# Patient Record
Sex: MALE | Race: WHITE | NOT HISPANIC OR LATINO | Employment: FULL TIME | URBAN - METROPOLITAN AREA
[De-identification: names, ages, dates, MRNs, and addresses within clinical notes are randomized per-mention and may not be internally consistent; named-entity substitution may affect disease eponyms.]

---

## 2023-09-11 ENCOUNTER — APPOINTMENT (OUTPATIENT)
Dept: PRIMARY CARE | Facility: CLINIC | Age: 38
End: 2023-09-11
Payer: COMMERCIAL

## 2023-09-30 PROBLEM — N39.0 RECURRENT UTI: Status: ACTIVE | Noted: 2023-09-30

## 2023-09-30 PROBLEM — I10 HYPERTENSION: Status: ACTIVE | Noted: 2023-09-30

## 2023-09-30 PROBLEM — R61 NIGHT SWEATS: Status: ACTIVE | Noted: 2023-09-30

## 2023-09-30 PROBLEM — K76.0 FATTY LIVER: Status: ACTIVE | Noted: 2023-09-30

## 2023-09-30 PROBLEM — R00.2 PALPITATIONS: Status: ACTIVE | Noted: 2023-09-30

## 2023-09-30 PROBLEM — N45.2 ORCHITIS: Status: ACTIVE | Noted: 2023-09-30

## 2023-09-30 PROBLEM — E87.6 HYPOKALEMIA: Status: ACTIVE | Noted: 2023-09-30

## 2023-09-30 PROBLEM — L30.9 ECZEMA: Status: ACTIVE | Noted: 2023-09-30

## 2023-09-30 PROBLEM — E66.9 OBESITY WITH BODY MASS INDEX (BMI) OF 35.0 TO 39.9 WITHOUT COMORBIDITY: Status: ACTIVE | Noted: 2023-09-30

## 2023-09-30 PROBLEM — R00.0 TACHYCARDIA: Status: ACTIVE | Noted: 2023-09-30

## 2023-09-30 PROBLEM — G89.18 POSTOPERATIVE PAIN: Status: ACTIVE | Noted: 2023-09-30

## 2023-09-30 PROBLEM — H60.90 OTITIS EXTERNA: Status: ACTIVE | Noted: 2023-09-30

## 2023-09-30 PROBLEM — I10 ESSENTIAL HYPERTENSION: Status: ACTIVE | Noted: 2023-09-30

## 2023-09-30 PROBLEM — K21.9 GERD (GASTROESOPHAGEAL REFLUX DISEASE): Status: ACTIVE | Noted: 2023-09-30

## 2023-09-30 PROBLEM — E78.5 HYPERLIPIDEMIA: Status: ACTIVE | Noted: 2023-09-30

## 2023-09-30 PROBLEM — R63.5 WEIGHT GAIN: Status: ACTIVE | Noted: 2023-09-30

## 2023-09-30 PROBLEM — K58.9 IBS (IRRITABLE BOWEL SYNDROME): Status: ACTIVE | Noted: 2023-09-30

## 2023-09-30 PROBLEM — F32.A ANXIETY AND DEPRESSION: Status: ACTIVE | Noted: 2023-09-30

## 2023-09-30 PROBLEM — F41.9 ANXIETY AND DEPRESSION: Status: ACTIVE | Noted: 2023-09-30

## 2023-09-30 PROBLEM — L98.9 SKIN LESION: Status: ACTIVE | Noted: 2023-09-30

## 2023-09-30 PROBLEM — N50.819 PERSISTENT TESTICULAR PAIN: Status: ACTIVE | Noted: 2023-09-30

## 2023-09-30 PROBLEM — I80.9 SUPERFICIAL THROMBOPHLEBITIS: Status: ACTIVE | Noted: 2023-09-30

## 2023-09-30 PROBLEM — F41.0 PANIC ATTACK: Status: ACTIVE | Noted: 2023-09-30

## 2023-09-30 PROBLEM — G47.00 INSOMNIA: Status: ACTIVE | Noted: 2023-09-30

## 2023-09-30 PROBLEM — R80.9 PROTEINURIA: Status: ACTIVE | Noted: 2023-09-30

## 2023-09-30 RX ORDER — OXYCODONE AND ACETAMINOPHEN 5; 325 MG/1; MG/1
1 TABLET ORAL EVERY 4 HOURS PRN
COMMUNITY
Start: 2023-05-30 | End: 2023-11-14 | Stop reason: ALTCHOICE

## 2023-09-30 RX ORDER — TRAZODONE HYDROCHLORIDE 50 MG/1
TABLET ORAL
COMMUNITY
Start: 2016-04-05 | End: 2023-11-14 | Stop reason: ALTCHOICE

## 2023-09-30 RX ORDER — ALPRAZOLAM 1 MG/1
1 TABLET ORAL 2 TIMES DAILY PRN
COMMUNITY
Start: 2023-08-29 | End: 2023-10-11 | Stop reason: SDUPTHER

## 2023-09-30 RX ORDER — ESOMEPRAZOLE MAGNESIUM 40 MG/1
40 CAPSULE, DELAYED RELEASE ORAL DAILY
COMMUNITY
End: 2024-04-30

## 2023-09-30 RX ORDER — GABAPENTIN 600 MG/1
600 TABLET ORAL 3 TIMES DAILY
COMMUNITY
Start: 2022-11-08 | End: 2023-11-14 | Stop reason: ALTCHOICE

## 2023-09-30 RX ORDER — QUETIAPINE FUMARATE 25 MG/1
25 TABLET, FILM COATED ORAL NIGHTLY
COMMUNITY
Start: 2023-02-13 | End: 2023-11-14 | Stop reason: ALTCHOICE

## 2023-09-30 RX ORDER — QUETIAPINE FUMARATE 50 MG/1
50 TABLET, FILM COATED ORAL NIGHTLY
COMMUNITY
Start: 2023-03-01 | End: 2023-11-14 | Stop reason: ALTCHOICE

## 2023-09-30 RX ORDER — CYCLOBENZAPRINE HCL 10 MG
10 TABLET ORAL EVERY 8 HOURS PRN
COMMUNITY
Start: 2023-05-24 | End: 2023-11-14 | Stop reason: ALTCHOICE

## 2023-09-30 RX ORDER — ATORVASTATIN CALCIUM 40 MG/1
40 TABLET, FILM COATED ORAL DAILY
COMMUNITY
End: 2024-04-16

## 2023-09-30 RX ORDER — SERTRALINE HYDROCHLORIDE 100 MG/1
200 TABLET, FILM COATED ORAL DAILY
COMMUNITY
Start: 2023-06-04

## 2023-09-30 RX ORDER — LISINOPRIL 40 MG/1
40 TABLET ORAL DAILY
COMMUNITY
Start: 2023-03-10 | End: 2023-11-14 | Stop reason: ALTCHOICE

## 2023-09-30 RX ORDER — CLONAZEPAM 1 MG/1
1 TABLET ORAL 2 TIMES DAILY PRN
COMMUNITY
End: 2023-11-14 | Stop reason: ALTCHOICE

## 2023-09-30 RX ORDER — ALPRAZOLAM 0.5 MG/1
0.5 TABLET ORAL 2 TIMES DAILY PRN
COMMUNITY
Start: 2023-02-26 | End: 2023-10-11

## 2023-09-30 RX ORDER — CIPROFLOXACIN AND DEXAMETHASONE 3; 1 MG/ML; MG/ML
4 SUSPENSION/ DROPS AURICULAR (OTIC) 2 TIMES DAILY
COMMUNITY
Start: 2022-08-25 | End: 2023-11-14 | Stop reason: ALTCHOICE

## 2023-09-30 RX ORDER — BUSPIRONE HYDROCHLORIDE 15 MG/1
15 TABLET ORAL 2 TIMES DAILY
COMMUNITY
Start: 2023-08-28 | End: 2024-06-03

## 2023-09-30 RX ORDER — VENLAFAXINE HYDROCHLORIDE 75 MG/1
225 CAPSULE, EXTENDED RELEASE ORAL DAILY
COMMUNITY
End: 2023-11-14 | Stop reason: ALTCHOICE

## 2023-09-30 RX ORDER — QUETIAPINE FUMARATE 100 MG/1
100 TABLET, FILM COATED ORAL NIGHTLY
COMMUNITY
Start: 2023-08-02 | End: 2023-12-11

## 2023-09-30 RX ORDER — SEMAGLUTIDE 1.34 MG/ML
INJECTION, SOLUTION SUBCUTANEOUS
COMMUNITY
Start: 2022-07-12 | End: 2023-11-14 | Stop reason: ALTCHOICE

## 2023-09-30 RX ORDER — BUSPIRONE HYDROCHLORIDE 7.5 MG/1
7.5 TABLET ORAL 2 TIMES DAILY
COMMUNITY
Start: 2023-06-02 | End: 2023-11-14 | Stop reason: WASHOUT

## 2023-09-30 RX ORDER — GABAPENTIN 300 MG/1
300 CAPSULE ORAL 3 TIMES DAILY
COMMUNITY
Start: 2022-10-15 | End: 2023-11-14 | Stop reason: ALTCHOICE

## 2023-09-30 RX ORDER — TRIAMCINOLONE ACETONIDE 1 MG/G
CREAM TOPICAL
COMMUNITY
Start: 2023-06-02

## 2023-09-30 RX ORDER — LISINOPRIL 20 MG/1
20 TABLET ORAL DAILY
COMMUNITY

## 2023-10-11 ENCOUNTER — TELEPHONE (OUTPATIENT)
Dept: PRIMARY CARE | Facility: CLINIC | Age: 38
End: 2023-10-11
Payer: COMMERCIAL

## 2023-10-11 DIAGNOSIS — F41.9 ANXIETY AND DEPRESSION: Primary | ICD-10-CM

## 2023-10-11 DIAGNOSIS — F32.A ANXIETY AND DEPRESSION: Primary | ICD-10-CM

## 2023-10-11 RX ORDER — ALPRAZOLAM 1 MG/1
1 TABLET ORAL 2 TIMES DAILY PRN
Qty: 30 TABLET | Refills: 0 | Status: SHIPPED | OUTPATIENT
Start: 2023-10-11 | End: 2023-11-03 | Stop reason: SDUPTHER

## 2023-10-11 NOTE — TELEPHONE ENCOUNTER
Rx Refill Request Telephone Encounter    Name:  Guy Moreland  :  [id number]  Medication Name:  Alprazolam 1 MG TABLET   Dose : 1 mg   Route : oral   Frequency : 2 times daily PRN   Quantity :    Directions :  Take 1 tablet (1 mg) by mouth 2 times a day as needed.  Specific Pharmacy location:  [facility]   Date of last appointment:    Date of next appointment:    Best number to reach patient:

## 2023-11-03 ENCOUNTER — TELEPHONE (OUTPATIENT)
Dept: PRIMARY CARE | Facility: CLINIC | Age: 38
End: 2023-11-03
Payer: COMMERCIAL

## 2023-11-03 DIAGNOSIS — F41.9 ANXIETY AND DEPRESSION: ICD-10-CM

## 2023-11-03 DIAGNOSIS — F32.A ANXIETY AND DEPRESSION: ICD-10-CM

## 2023-11-03 RX ORDER — ALPRAZOLAM 1 MG/1
1 TABLET ORAL 2 TIMES DAILY PRN
Qty: 30 TABLET | Refills: 0 | Status: SHIPPED | OUTPATIENT
Start: 2023-11-03 | End: 2023-11-23

## 2023-11-03 NOTE — TELEPHONE ENCOUNTER
Rx Refill Request Telephone Encounter    Name:  Guy Moreland  :  [id number]  Medication Name:  Alprazolam   Dose : 1 mg   Route :    Frequency : 1 as needed twice daily   Quantity : 30  Directions :    Specific Pharmacy location:  [facility]   Date of last appointment:    Date of next appointment:    Best number to reach patient:  [phone]

## 2023-11-13 NOTE — PROGRESS NOTES
Subjective     History of Present Illness:   Guy Moreland is a 38 y.o. male who presents to GI clinic for colonoscopy and rectal bleeding.  He had an EGD 2019 which showed some gastritis biopsies negative for celiac and H. pylori.  He had a colonoscopy at the same time that showed a single tubular adenoma with biopsies negative for microscopic colitis.  He is here because of rectal bleeding and because his primary care doctor told him that he needs a colonoscopy.    He did have some rectal bleeding where he feels that there is bright red blood on the rectum.  In addition to this he has diarrhea almost every day.  He said he goes anywhere between 5 and 8 times a day.  The first few times are all watery liquid then it becomes harder to go as the day goes by.  He said he has had that since age 15 or 16.  He said that he has had more than 2 but is not recall when the last one before the 2 1 in 2019 was.  He said at some point he was found to polyp but it was not removed.  He does not recall if the recommendation was to repeat in 5 years or 3 years.  He also has chronic GERD and takes medications every day.  As long as he takes medications as reflux is well controlled.  He did have an ankle injury and has gained a lot of weight since then because he is not able to move.        Review of Systems  Review of Systems   Constitutional: Negative.    HENT: Negative.     Eyes: Negative.    Respiratory: Negative.     Cardiovascular: Negative.    Gastrointestinal:         As in HPI    Endocrine: Negative.    Genitourinary: Negative.    Musculoskeletal: Negative.    Skin: Negative.    Neurological: Negative.    Psychiatric/Behavioral: Negative.         Past Medical History   has a past medical history of Acute prostatitis (03/03/2019), Allergy status to unspecified drugs, medicaments and biological substances (06/08/2017), COVID-19 (01/04/2022), Cutaneous abscess of buttock (06/08/2017), Cutaneous abscess of buttock (04/28/2017),  Cutaneous abscess of left lower limb (08/11/2015), Cutaneous abscess, unspecified (10/22/2015), Disease of intestine, unspecified, Dizziness and giddiness (03/03/2019), Encounter for screening for infections with a predominantly sexual mode of transmission (03/03/2015), Fever, unspecified (01/25/2017), Left lower quadrant pain (06/14/2018), Other acute postprocedural pain (10/16/2020), Other injury of unspecified body region, initial encounter (12/12/2016), Other intra-abdominal and pelvic swelling, mass and lump (07/13/2020), Other intra-abdominal and pelvic swelling, mass and lump (10/08/2020), Other intra-abdominal and pelvic swelling, mass and lump (10/22/2020), Pain in right ankle and joints of right foot (06/03/2016), Pain in right lower leg (03/20/2020), Personal history of diseases of the blood and blood-forming organs and certain disorders involving the immune mechanism (06/08/2020), Personal history of nicotine dependence (04/05/2016), Personal history of nicotine dependence (09/06/2017), Personal history of nicotine dependence (02/01/2016), Personal history of other (healed) physical injury and trauma (06/25/2017), Personal history of other diseases of male genital organs (08/05/2019), Personal history of other diseases of male genital organs (04/19/2019), Personal history of other diseases of the musculoskeletal system and connective tissue (06/03/2016), Personal history of other diseases of the respiratory system (12/11/2015), Personal history of other diseases of the respiratory system (02/05/2018), Personal history of other diseases of the respiratory system (08/05/2019), Personal history of other endocrine, nutritional and metabolic disease (03/03/2015), Personal history of other infectious and parasitic diseases (01/14/2019), Personal history of other specified conditions (07/26/2017), Personal history of urinary (tract) infections (08/06/2014), Pneumonia, unspecified organism (06/28/2017),  Sialolithiasis, Unspecified hearing loss, left ear (02/05/2018), Unspecified injury of right wrist, hand and finger(s), initial encounter (06/25/2017), Unspecified nonsuppurative otitis media, right ear (02/05/2018), Unspecified nonsuppurative otitis media, unspecified ear (09/19/2018), Unspecified otitis externa, unspecified ear (12/11/2015), Urinary tract infection, site not specified (01/31/2017), and Urinary tract infection, site not specified (03/05/2019).     Social History        Family History  family history includes Diabetes in an other family member; Hyperlipidemia in his father; Parkinsonism in his paternal grandfather; Raynaud syndrome in his brother; Scleroderma in his mother; Skin cancer in his father's brother and paternal grandfather.     Allergies  Allergies   Allergen Reactions    Sulfa (Sulfonamide Antibiotics) Unknown       Medications  Current Outpatient Medications   Medication Instructions    ALPRAZolam (XANAX) 1 mg, oral, 2 times daily PRN    atorvastatin (LIPITOR) 40 mg, oral, Daily    busPIRone (BUSPAR) 15 mg, oral, 2 times daily    busPIRone (BUSPAR) 7.5 mg, oral, 2 times daily    ciprofloxacin-dexamethasone (CiproDEX) otic suspension 4 drops, otic (ear), 2 times daily    clonazePAM (KLONOPIN) 1 mg, oral, 2 times daily PRN    cyclobenzaprine (FLEXERIL) 10 mg, oral, Every 8 hours PRN    esomeprazole (NEXIUM) 40 mg, oral, Daily    gabapentin (NEURONTIN) 300 mg, oral, 3 times daily    gabapentin (NEURONTIN) 600 mg, oral, 3 times daily    lisinopril 20 mg, oral, Daily    lisinopril 40 mg, oral, Daily    oxyCODONE-acetaminophen (Percocet) 5-325 mg tablet 1 tablet, oral, Every 4 hours PRN    QUEtiapine (SEROQUEL) 100 mg, oral, Nightly    QUEtiapine (SEROQUEL) 25 mg, oral, Nightly    QUEtiapine (SEROQUEL) 50 mg, oral, Nightly    semaglutide (Ozempic) 0.25 mg or 0.5 mg(2 mg/1.5 mL) pen injector Inject 0.25mg subq once weekly for 4 weeks, then increase to 0.5mg once weekly    sertraline (ZOLOFT)  200 mg, oral, Daily    traZODone (Desyrel) 50 mg tablet Take 3-4 tablets at bedtime as needed for sleep.    triamcinolone (Kenalog) 0.1 % cream APPLY TO AFFECTED AREA 2 TO 3 TIMES DAILY    venlafaxine XR (EFFEXOR-XR) 225 mg, oral, Daily        Objective   There were no vitals taken for this visit.   Physical Exam  Vitals reviewed.   Constitutional:       Appearance: Normal appearance.      Comments: Overweight   HENT:      Head: Normocephalic.   Eyes:      Conjunctiva/sclera: Conjunctivae normal.      Pupils: Pupils are equal, round, and reactive to light.   Cardiovascular:      Rate and Rhythm: Normal rate and regular rhythm.   Pulmonary:      Effort: Pulmonary effort is normal.      Breath sounds: Normal breath sounds.   Abdominal:      General: Abdomen is flat. Bowel sounds are normal. There is no distension.      Palpations: Abdomen is soft.      Tenderness: There is no abdominal tenderness. There is no guarding or rebound.   Musculoskeletal:         General: Normal range of motion.   Skin:     General: Skin is warm and dry.   Neurological:      General: No focal deficit present.      Mental Status: He is alert and oriented to person, place, and time.         Assessment/Plan   Guy Moreland is a 38 y.o. male who presents to GI clinic for evaluation of rectal bleeding.    1.  Rectal bleeding is likely related to hemorrhoids.  We will do a colonoscopy since it looks like he is due for one.  I will clarify whether he needs a colonoscopy of 3 or 5 years after that.    2.  IBS with diarrhea.  I will give her a course of Xifaxan to try.    3.  GERD.  Appears to be controlled on PPI.  Advised weight loss.  Continue PPI.  No indication for EGD at this point.  .          Romario Warner MD

## 2023-11-14 ENCOUNTER — OFFICE VISIT (OUTPATIENT)
Dept: GASTROENTEROLOGY | Facility: CLINIC | Age: 38
End: 2023-11-14
Payer: COMMERCIAL

## 2023-11-14 VITALS
SYSTOLIC BLOOD PRESSURE: 139 MMHG | WEIGHT: 281 LBS | HEART RATE: 101 BPM | BODY MASS INDEX: 38.06 KG/M2 | DIASTOLIC BLOOD PRESSURE: 91 MMHG | HEIGHT: 72 IN

## 2023-11-14 DIAGNOSIS — D12.6 COLON ADENOMA: ICD-10-CM

## 2023-11-14 DIAGNOSIS — K21.9 GASTROESOPHAGEAL REFLUX DISEASE WITHOUT ESOPHAGITIS: Primary | ICD-10-CM

## 2023-11-14 DIAGNOSIS — K62.5 RECTAL BLEEDING: ICD-10-CM

## 2023-11-14 DIAGNOSIS — K58.0 IRRITABLE BOWEL SYNDROME WITH DIARRHEA: ICD-10-CM

## 2023-11-14 PROCEDURE — 99204 OFFICE O/P NEW MOD 45 MIN: CPT | Performed by: INTERNAL MEDICINE

## 2023-11-14 PROCEDURE — 3075F SYST BP GE 130 - 139MM HG: CPT | Performed by: INTERNAL MEDICINE

## 2023-11-14 PROCEDURE — 3080F DIAST BP >= 90 MM HG: CPT | Performed by: INTERNAL MEDICINE

## 2023-11-14 RX ORDER — SODIUM, POTASSIUM,MAG SULFATES 17.5-3.13G
1 SOLUTION, RECONSTITUTED, ORAL ORAL 2 TIMES DAILY
Qty: 1 EACH | Refills: 0 | Status: SHIPPED | OUTPATIENT
Start: 2023-11-14 | End: 2023-11-15

## 2023-11-14 RX ORDER — SODIUM CHLORIDE 9 MG/ML
20 INJECTION, SOLUTION INTRAVENOUS CONTINUOUS
Status: CANCELLED | OUTPATIENT
Start: 2023-11-14

## 2023-11-14 ASSESSMENT — ENCOUNTER SYMPTOMS
ROS GI COMMENTS: AS IN HPI
CONSTITUTIONAL NEGATIVE: 1
EYES NEGATIVE: 1
CARDIOVASCULAR NEGATIVE: 1
RESPIRATORY NEGATIVE: 1
ENDOCRINE NEGATIVE: 1
PSYCHIATRIC NEGATIVE: 1
NEUROLOGICAL NEGATIVE: 1
MUSCULOSKELETAL NEGATIVE: 1

## 2023-11-21 ENCOUNTER — ANESTHESIA EVENT (OUTPATIENT)
Dept: GASTROENTEROLOGY | Facility: EXTERNAL LOCATION | Age: 38
End: 2023-11-21

## 2023-11-22 DIAGNOSIS — F32.A ANXIETY AND DEPRESSION: ICD-10-CM

## 2023-11-22 DIAGNOSIS — F41.9 ANXIETY AND DEPRESSION: ICD-10-CM

## 2023-11-23 RX ORDER — ALPRAZOLAM 1 MG/1
TABLET ORAL
Qty: 30 TABLET | Refills: 0 | Status: SHIPPED | OUTPATIENT
Start: 2023-11-23 | End: 2023-12-11

## 2023-12-05 ENCOUNTER — ANESTHESIA (OUTPATIENT)
Dept: GASTROENTEROLOGY | Facility: EXTERNAL LOCATION | Age: 38
End: 2023-12-05

## 2023-12-05 ENCOUNTER — HOSPITAL ENCOUNTER (OUTPATIENT)
Dept: GASTROENTEROLOGY | Facility: EXTERNAL LOCATION | Age: 38
Discharge: HOME | End: 2023-12-05
Payer: COMMERCIAL

## 2023-12-05 VITALS
DIASTOLIC BLOOD PRESSURE: 77 MMHG | WEIGHT: 268 LBS | TEMPERATURE: 98.1 F | HEIGHT: 72 IN | HEART RATE: 89 BPM | BODY MASS INDEX: 36.3 KG/M2 | RESPIRATION RATE: 17 BRPM | OXYGEN SATURATION: 99 % | SYSTOLIC BLOOD PRESSURE: 118 MMHG

## 2023-12-05 DIAGNOSIS — D12.6 COLON ADENOMA: ICD-10-CM

## 2023-12-05 DIAGNOSIS — K62.5 RECTAL BLEEDING: ICD-10-CM

## 2023-12-05 PROCEDURE — 45378 DIAGNOSTIC COLONOSCOPY: CPT | Performed by: INTERNAL MEDICINE

## 2023-12-05 RX ORDER — LIDOCAINE HYDROCHLORIDE 20 MG/ML
INJECTION, SOLUTION INFILTRATION; PERINEURAL AS NEEDED
Status: DISCONTINUED | OUTPATIENT
Start: 2023-12-05 | End: 2023-12-05

## 2023-12-05 RX ORDER — PROPOFOL 10 MG/ML
INJECTION, EMULSION INTRAVENOUS AS NEEDED
Status: DISCONTINUED | OUTPATIENT
Start: 2023-12-05 | End: 2023-12-05

## 2023-12-05 RX ORDER — SODIUM CHLORIDE 9 MG/ML
20 INJECTION, SOLUTION INTRAVENOUS CONTINUOUS
Status: DISCONTINUED | OUTPATIENT
Start: 2023-12-05 | End: 2023-12-06 | Stop reason: HOSPADM

## 2023-12-05 RX ADMIN — PROPOFOL 50 MG: 10 INJECTION, EMULSION INTRAVENOUS at 08:22

## 2023-12-05 RX ADMIN — PROPOFOL 50 MG: 10 INJECTION, EMULSION INTRAVENOUS at 08:18

## 2023-12-05 RX ADMIN — PROPOFOL 100 MG: 10 INJECTION, EMULSION INTRAVENOUS at 08:16

## 2023-12-05 RX ADMIN — PROPOFOL 100 MG: 10 INJECTION, EMULSION INTRAVENOUS at 08:14

## 2023-12-05 RX ADMIN — PROPOFOL 50 MG: 10 INJECTION, EMULSION INTRAVENOUS at 08:24

## 2023-12-05 RX ADMIN — PROPOFOL 50 MG: 10 INJECTION, EMULSION INTRAVENOUS at 08:20

## 2023-12-05 RX ADMIN — PROPOFOL 50 MG: 10 INJECTION, EMULSION INTRAVENOUS at 08:17

## 2023-12-05 RX ADMIN — PROPOFOL 100 MG: 10 INJECTION, EMULSION INTRAVENOUS at 08:15

## 2023-12-05 RX ADMIN — SODIUM CHLORIDE: 9 INJECTION, SOLUTION INTRAVENOUS at 08:09

## 2023-12-05 RX ADMIN — LIDOCAINE HYDROCHLORIDE 2 ML: 20 INJECTION, SOLUTION INFILTRATION; PERINEURAL at 08:14

## 2023-12-05 RX ADMIN — PROPOFOL 50 MG: 10 INJECTION, EMULSION INTRAVENOUS at 08:19

## 2023-12-05 ASSESSMENT — PAIN - FUNCTIONAL ASSESSMENT
PAIN_FUNCTIONAL_ASSESSMENT: 0-10

## 2023-12-05 ASSESSMENT — PAIN SCALES - GENERAL
PAINLEVEL_OUTOF10: 0 - NO PAIN
PAINLEVEL_OUTOF10: 0 - NO PAIN
PAIN_LEVEL: 0
PAINLEVEL_OUTOF10: 0 - NO PAIN
PAINLEVEL_OUTOF10: 0 - NO PAIN

## 2023-12-05 ASSESSMENT — COLUMBIA-SUICIDE SEVERITY RATING SCALE - C-SSRS
1. IN THE PAST MONTH, HAVE YOU WISHED YOU WERE DEAD OR WISHED YOU COULD GO TO SLEEP AND NOT WAKE UP?: NO
6. HAVE YOU EVER DONE ANYTHING, STARTED TO DO ANYTHING, OR PREPARED TO DO ANYTHING TO END YOUR LIFE?: NO
2. HAVE YOU ACTUALLY HAD ANY THOUGHTS OF KILLING YOURSELF?: NO

## 2023-12-05 NOTE — DISCHARGE INSTRUCTIONS
The anesthetics, sedatives and pain killers which were given to you will be acting in your body for the next 24 hours. This may cause you to feel sleepy. This feeling will slowly wear off. For the next 24 hours you SHOULD NOT:    Drive a car  Operate machinery or power tools.  Drink any form of alcohol, including beer or wine.  Make any important decisions or sign and legal documents.    You may eat anything as long as your physician has not warned you to stay away from certain foods. However, it is better to start with liquids,  then progress to softer foods, and gradually work up to solid foods.    We strongly suggest that a responsible adult be with you for the rest of the day and also the night. This is for your protection and safety since you may not be as alert as usual. You should be especially careful climbing stairs.     If you experience bleeding, fever, shortness of breath, chest pain, or extreme abdominal pain go to the nearest Emergency Room.    [facility] Phone Number [phone]  AFTER HOURS [phone]

## 2023-12-05 NOTE — ANESTHESIA POSTPROCEDURE EVALUATION
Patient: Guy Moreland    Procedure Summary       Date: 12/05/23 Room / Location: [facility]    Anesthesia Start: 0809 Anesthesia Stop: 0832    Procedure: COLONOSCOPY Diagnosis:       Rectal bleeding      Colon adenoma    Scheduled Providers: Romario Warner MD; Yamileth Goodwin RN Responsible Provider: MAKI Perez    Anesthesia Type: MAC ASA Status: 3            Anesthesia Type: MAC    Vitals Value Taken Time   /68 12/05/23 0832   Temp 36.37 12/05/23 0832   Pulse 82 12/05/23 0832   Resp 14 12/05/23 0832   SpO2 97 12/05/23 0832       Anesthesia Post Evaluation    Patient location during evaluation: PACU  Patient participation: waiting for patient participation  Level of consciousness: responsive to physical stimuli  Pain score: 0  Pain management: adequate  Airway patency: patent  Cardiovascular status: blood pressure returned to baseline  Respiratory status: acceptable  Hydration status: acceptable  Postoperative Nausea and Vomiting: none        No notable events documented.

## 2023-12-05 NOTE — ANESTHESIA PREPROCEDURE EVALUATION
Patient: Guy Moreland    Procedure Information       Date/Time: 12/05/23 0800    Scheduled providers: Romario Warner MD    Procedure: COLONOSCOPY    Location: [facility]            Relevant Problems   Cardiovascular   (+) Essential hypertension   (+) Hyperlipidemia   (+) Hypertension      GI   (+) GERD (gastroesophageal reflux disease)   (+) IBS (irritable bowel syndrome)      /Renal   (+) Fatty liver   (+) Recurrent UTI      Neuro/Psych   (+) Anxiety and depression   (+) Panic attack      GI/Hepatic   (+) Fatty liver      Infectious Disease   (+) Recurrent UTI       Clinical information reviewed:    Allergies  Meds               NPO Detail:  NPO/Void Status  Date of Last Liquid: 12/05/23  Time of Last Liquid: 0600  Date of Last Solid: 12/04/23  Time of Last Solid: 0900  Last Intake Type: Clear fluids         Physical Exam    Airway  Mallampati: II  TM distance: >3 FB  Neck ROM: full     Cardiovascular - normal exam     Dental - normal exam     Pulmonary - normal exam     Abdominal - normal exam       Other findings: C/o migraines after propofol... pt given the option to nhave versed/fentanyl for procedure, pt declined and requested propofol.         Anesthesia Plan    ASA 3     MAC   (With IVF bolus)  Anesthetic plan and risks discussed with patient.

## 2023-12-11 DIAGNOSIS — F41.9 ANXIETY AND DEPRESSION: ICD-10-CM

## 2023-12-11 DIAGNOSIS — F32.A ANXIETY AND DEPRESSION: ICD-10-CM

## 2023-12-11 RX ORDER — QUETIAPINE FUMARATE 100 MG/1
100 TABLET, FILM COATED ORAL NIGHTLY
Qty: 30 TABLET | Refills: 3 | Status: SHIPPED | OUTPATIENT
Start: 2023-12-11 | End: 2024-04-30

## 2023-12-11 RX ORDER — ALPRAZOLAM 1 MG/1
TABLET ORAL
Qty: 30 TABLET | Refills: 0 | Status: SHIPPED | OUTPATIENT
Start: 2023-12-11 | End: 2023-12-28

## 2023-12-27 DIAGNOSIS — F41.9 ANXIETY AND DEPRESSION: ICD-10-CM

## 2023-12-27 DIAGNOSIS — F32.A ANXIETY AND DEPRESSION: ICD-10-CM

## 2023-12-28 RX ORDER — ALPRAZOLAM 1 MG/1
TABLET ORAL
Qty: 30 TABLET | Refills: 0 | Status: SHIPPED | OUTPATIENT
Start: 2023-12-28 | End: 2024-01-15

## 2024-01-14 DIAGNOSIS — F32.A ANXIETY AND DEPRESSION: ICD-10-CM

## 2024-01-14 DIAGNOSIS — F41.9 ANXIETY AND DEPRESSION: ICD-10-CM

## 2024-01-15 RX ORDER — ALPRAZOLAM 1 MG/1
TABLET ORAL
Qty: 30 TABLET | Refills: 0 | Status: SHIPPED | OUTPATIENT
Start: 2024-01-15 | End: 2024-02-05

## 2024-02-05 DIAGNOSIS — F32.A ANXIETY AND DEPRESSION: ICD-10-CM

## 2024-02-05 DIAGNOSIS — F41.9 ANXIETY AND DEPRESSION: ICD-10-CM

## 2024-02-05 RX ORDER — ALPRAZOLAM 1 MG/1
TABLET ORAL
Qty: 30 TABLET | Refills: 0 | Status: SHIPPED | OUTPATIENT
Start: 2024-02-05 | End: 2024-02-28 | Stop reason: SDUPTHER

## 2024-02-26 DIAGNOSIS — F41.9 ANXIETY AND DEPRESSION: ICD-10-CM

## 2024-02-26 DIAGNOSIS — F32.A ANXIETY AND DEPRESSION: ICD-10-CM

## 2024-02-28 DIAGNOSIS — F41.9 ANXIETY AND DEPRESSION: ICD-10-CM

## 2024-02-28 DIAGNOSIS — F32.A ANXIETY AND DEPRESSION: ICD-10-CM

## 2024-02-28 RX ORDER — ALPRAZOLAM 1 MG/1
TABLET ORAL
Qty: 30 TABLET | Refills: 0 | Status: SHIPPED | OUTPATIENT
Start: 2024-02-28 | End: 2024-03-18

## 2024-03-18 DIAGNOSIS — F41.9 ANXIETY AND DEPRESSION: ICD-10-CM

## 2024-03-18 DIAGNOSIS — F32.A ANXIETY AND DEPRESSION: ICD-10-CM

## 2024-03-18 RX ORDER — ALPRAZOLAM 1 MG/1
TABLET ORAL
Qty: 30 TABLET | Refills: 0 | Status: SHIPPED | OUTPATIENT
Start: 2024-03-18 | End: 2024-04-08

## 2024-04-03 ENCOUNTER — PRE-ADMISSION TESTING (OUTPATIENT)
Dept: PREADMISSION TESTING | Facility: HOSPITAL | Age: 39
End: 2024-04-03
Payer: COMMERCIAL

## 2024-04-03 VITALS
WEIGHT: 270 LBS | HEART RATE: 99 BPM | SYSTOLIC BLOOD PRESSURE: 133 MMHG | HEIGHT: 72 IN | BODY MASS INDEX: 36.57 KG/M2 | DIASTOLIC BLOOD PRESSURE: 67 MMHG | RESPIRATION RATE: 16 BRPM | OXYGEN SATURATION: 98 % | TEMPERATURE: 96.1 F

## 2024-04-03 PROCEDURE — 99203 OFFICE O/P NEW LOW 30 MIN: CPT | Performed by: NURSE PRACTITIONER

## 2024-04-03 ASSESSMENT — DUKE ACTIVITY SCORE INDEX (DASI)
CAN YOU HAVE SEXUAL RELATIONS: YES
CAN YOU TAKE CARE OF YOURSELF (EAT, DRESS, BATHE, OR USE TOILET): YES
CAN YOU CLIMB A FLIGHT OF STAIRS OR WALK UP A HILL: YES
TOTAL_SCORE: 42.7
CAN YOU PARTICIPATE IN STRENOUS SPORTS LIKE SWIMMING, SINGLES TENNIS, FOOTBALL, BASKETBALL, OR SKIING: NO
CAN YOU WALK INDOORS, SUCH AS AROUND YOUR HOUSE: YES
CAN YOU RUN A SHORT DISTANCE: YES
CAN YOU WALK A BLOCK OR TWO ON LEVEL GROUND: YES
CAN YOU DO LIGHT WORK AROUND THE HOUSE LIKE DUSTING OR WASHING DISHES: YES
DASI METS SCORE: 8
CAN YOU DO MODERATE WORK AROUND THE HOUSE LIKE VACUUMING, SWEEPING FLOORS OR CARRYING GROCERIES: YES
CAN YOU DO HEAVY WORK AROUND THE HOUSE LIKE SCRUBBING FLOORS OR LIFTING AND MOVING HEAVY FURNITURE: NO
CAN YOU DO YARD WORK LIKE RAKING LEAVES, WEEDING OR PUSHING A MOWER: YES
CAN YOU PARTICIPATE IN MODERATE RECREATIONAL ACTIVITIES LIKE GOLF, BOWLING, DANCING, DOUBLES TENNIS OR THROWING A BASEBALL OR FOOTBALL: YES

## 2024-04-03 ASSESSMENT — CHADS2 SCORE
DIABETES: NO
AGE GREATER THAN OR EQUAL TO 75: NO
DIABETES: NO
PRIOR STROKE OR TIA OR THROMBOEMBOLISM: NO
HYPERTENSION: YES
CHADS2 SCORE: 1
PRIOR STROKE OR TIA OR THROMBOEMBOLISM: NO
HYPERTENSION: YES
CHADS2 SCORE: 1
AGE GREATER THAN OR EQUAL TO 75: NO
CHF: NO
CHF: NO

## 2024-04-03 ASSESSMENT — ENCOUNTER SYMPTOMS
GASTROINTESTINAL NEGATIVE: 1
EYES NEGATIVE: 1
ARTHRALGIAS: 1
ACTIVITY CHANGE: 1
RESPIRATORY NEGATIVE: 1
CARDIOVASCULAR NEGATIVE: 1
PSYCHIATRIC NEGATIVE: 1

## 2024-04-03 ASSESSMENT — PAIN - FUNCTIONAL ASSESSMENT: PAIN_FUNCTIONAL_ASSESSMENT: 0-10

## 2024-04-03 ASSESSMENT — PAIN SCALES - GENERAL: PAINLEVEL_OUTOF10: 7

## 2024-04-03 ASSESSMENT — PAIN DESCRIPTION - DESCRIPTORS: DESCRIPTORS: SHARP

## 2024-04-03 NOTE — H&P (VIEW-ONLY)
"CPM/PAT Evaluation       Name: Guy Moreland (Guy Moreland)  /Age: 1985/38 y.o.     In-Person       Chief Complaint: Pain in right ankle joint, bone spurs of right tibia/talus, secondary osteoarthritis of right ankle, difficulty walking.     HPI  A 38-year-old male with pain in right ankle joint, bone spurs of the right tibia/talus, secondary are osteoarthritis of right ankle, difficulty walking.  History of right ankle fracture in the past status post multiple right ankle surgeries.  He has had recurrent shooting sharp pain in right foot/ankle that increases with prolonged standing and ambulation interfering with work, ADLs, and quality of life.  Patient states \"I have an overgrowth of bone\". Conservative treatments /injections not helping.  Endorses areas of numbness in right lower extremity.  Denies fever or chills.  He is scheduled for right ankle arthrotomy, manual manipulation of ankle joint under fluoroscopy, excision of bone spur of tibia, right amniotic graft application.    Past Medical History:   Diagnosis Date    Acute prostatitis 2019    Acute prostatitis with hematuria    Allergy status to unspecified drugs, medicaments and biological substances 2017    History of adverse drug reaction    Anxiety     COVID-19 2022    COVID-19    Cutaneous abscess of buttock 2017    Abscess of buttock    Cutaneous abscess of buttock 2017    Abscess, gluteal, left    Cutaneous abscess of left lower limb 2015    Abscess of knee, left    Cutaneous abscess, unspecified 10/22/2015    Abscess    Disease of intestine, unspecified     Intestinal disorder    Dizziness and giddiness 2019    Dizzy spells    Encounter for screening for infections with a predominantly sexual mode of transmission 2015    Screen for STD (sexually transmitted disease)    Fever, unspecified 2017    Fever in adult    GERD (gastroesophageal reflux disease)     Hyperlipidemia     Hypertension "     Left lower quadrant pain 06/14/2018    LLQ discomfort    Other acute postprocedural pain 10/16/2020    Post-operative pain    Other injury of unspecified body region, initial encounter 12/12/2016    Puncture wound    Other intra-abdominal and pelvic swelling, mass and lump 07/13/2020    Nodule of groin    Other intra-abdominal and pelvic swelling, mass and lump 10/08/2020    Inguinal mass    Other intra-abdominal and pelvic swelling, mass and lump 10/22/2020    Mass of inguinal region    Pain in right ankle and joints of right foot 06/03/2016    Chronic pain of right ankle    Pain in right lower leg 03/20/2020    Right calf pain    Personal history of diseases of the blood and blood-forming organs and certain disorders involving the immune mechanism 06/08/2020    History of leukocytosis    Personal history of nicotine dependence 04/05/2016    History of nicotine dependence    Personal history of nicotine dependence 09/06/2017    History of nicotine dependence    Personal history of nicotine dependence 02/01/2016    History of nicotine dependence    Personal history of other (healed) physical injury and trauma 06/25/2017    History of sprain    Personal history of other diseases of male genital organs 08/05/2019    History of epididymitis    Personal history of other diseases of male genital organs 04/19/2019    History of epididymitis    Personal history of other diseases of the musculoskeletal system and connective tissue 06/03/2016    History of low back pain    Personal history of other diseases of the respiratory system 12/11/2015    History of acute sinusitis    Personal history of other diseases of the respiratory system 02/05/2018    History of acute sinusitis    Personal history of other diseases of the respiratory system 08/05/2019    History of acute sinusitis    Personal history of other endocrine, nutritional and metabolic disease 03/03/2015    History of obesity    Personal history of other  infectious and parasitic diseases 01/14/2019    History of viral gastroenteritis    Personal history of other specified conditions 07/26/2017    History of postnasal drip    Personal history of urinary (tract) infections 08/06/2014    Personal history of urinary tract infection    Pneumonia, unspecified organism 06/28/2017    CAP (community acquired pneumonia)    Sialolithiasis     Salivary calculus    Spinal headache     Unspecified hearing loss, left ear 02/05/2018    Hearing loss of left ear    Unspecified injury of right wrist, hand and finger(s), initial encounter 06/25/2017    Thumb injury, right, initial encounter    Unspecified nonsuppurative otitis media, right ear 02/05/2018    Right otitis media with effusion    Unspecified nonsuppurative otitis media, unspecified ear 09/19/2018    Otitis media with effusion    Unspecified otitis externa, unspecified ear 12/11/2015    Otitis externa    Urinary tract infection, site not specified 01/31/2017    Acute UTI (urinary tract infection)    Urinary tract infection, site not specified 03/05/2019    Acute UTI       Past Surgical History:   Procedure Laterality Date    ABDOMINAL SURGERY      ANKLE SURGERY  01/04/2023    Ankle Surgery    COLONOSCOPY  06/05/2017    Colonoscopy (Fiberoptic)    HERNIA REPAIR  09/21/2020    Hernia Repair    OTHER SURGICAL HISTORY  06/05/2017    Incision And Drainage Of Skin Abscess Buttocks         Allergies   Allergen Reactions    Sulfa (Sulfonamide Antibiotics) Unknown and Other     TOLD AS A CHILD NOT TO TAKE       Current Outpatient Medications   Medication Sig Dispense Refill    ALPRAZolam (Xanax) 1 mg tablet TAKE ONE TABLET BY MOUTH TWICE DAILY AS NEEDED FOR ANXIETY FOR UP TO 15 DAYS. 30 tablet 0    atorvastatin (Lipitor) 40 mg tablet Take 1 tablet (40 mg) by mouth once daily.      busPIRone (Buspar) 15 mg tablet Take 1 tablet (15 mg) by mouth 2 times a day.      esomeprazole (NexIUM) 40 mg DR capsule Take 1 capsule (40 mg) by mouth  once daily.      lisinopril 20 mg tablet Take 1 tablet (20 mg) by mouth once daily.      QUEtiapine (SEROquel) 100 mg tablet TAKE ONE TABLET BY MOUTH AT BEDTIME 30 tablet 3    sertraline (Zoloft) 100 mg tablet Take 2 tablets (200 mg) by mouth once daily.      triamcinolone (Kenalog) 0.1 % cream APPLY TO AFFECTED AREA 2 TO 3 TIMES DAILY       No current facility-administered medications for this visit.     Review of Systems   Constitutional:  Positive for activity change.   HENT: Negative.     Eyes: Negative.    Respiratory: Negative.     Cardiovascular: Negative.    Gastrointestinal: Negative.    Genitourinary: Negative.    Musculoskeletal:  Positive for arthralgias and gait problem.        Right ankle pain   Skin: Negative.    Psychiatric/Behavioral: Negative.          Physical Exam  Vitals reviewed.   HENT:      Head: Normocephalic and atraumatic.      Mouth/Throat:      Mouth: Mucous membranes are moist.   Eyes:      Pupils: Pupils are equal, round, and reactive to light.   Cardiovascular:      Rate and Rhythm: Normal rate and regular rhythm.   Pulmonary:      Effort: Pulmonary effort is normal.      Breath sounds: Normal breath sounds.   Abdominal:      Palpations: Abdomen is soft.   Musculoskeletal:         General: Normal range of motion.      Cervical back: Normal range of motion.      Comments: Right ankle pain, difficulty ambulating   Skin:     General: Skin is warm and dry.   Neurological:      Mental Status: He is alert and oriented to person, place, and time.   Psychiatric:         Mood and Affect: Mood normal.          PAT AIRWAY:   Airway:     Mallampati::  I    Neck ROM::  Full  normal        /67   Pulse 99   Temp 35.6 °C (96.1 °F) (Temporal)   Resp 16   Ht 1.829 m (6')   Wt 122 kg (270 lb)   SpO2 98%   BMI 36.62 kg/m²       Stop Bang Score 5     CHADS 2 score: 2.8%  DASI score: 42.7  METS score: 8  Revised cardiac risk index: 0.4%  ASA II  ARISCAT 1.6%  Labs done 1/4/2024 CBC,  CMP  Assessment and Plan:     Pain in right ankle joint, bone spurs of right tibia/talus, secondary osteoarthritis of right ankle, difficulty walking.  Plan: Right ankle arthrotomy, manual manipulation of ankle joint under fluoroscopy, excision of bone spur of tibia, right amniotic graft application  Hypertension managed with lisinopril  Hyperlipidemia managed with atorvastatin  Anxiety  History of palpitations 2 years ago-negative workup denies any recurrence  GERD   Ex-smoker quit 2018  BMI 36.62

## 2024-04-03 NOTE — PREPROCEDURE INSTRUCTIONS
PAT DISCHARGE INSTRUCTIONS    Please call the Same Day Surgery (SDS) Department of the hospital where your procedure will be performed after 2:00 PM the day before your surgery. If you are scheduled on a Monday, or a Tuesday following a Monday holiday, you will need to call on the last business day prior to your surgery.    [facility]  [address], [address]  [phone]    [facility]  [address] [place] [address] [phone]    [facility]  [address]  [place] [id number]  [phone]    Please let your surgeon know if:      You develop any open sores, shingles, burning or painful urination as these may increase your risk of an infection.   You no longer wish to have the surgery.   Any other personal circumstances change that may lead to the need to cancel or defer this surgery-such as being sick or getting admitted to any hospital within one week of your planned procedure.    Your contact details change, such as a change of address or phone number.    Starting now:     Please DO NOT drink alcohol or smoke for 24 hours before surgery. It is well known that quitting smoking can make a huge difference to your health and recovery from surgery. The longer you abstain from smoking, the better your chances of a healthy recovery. If you need help with quitting, call 5-800-QUIT-NOW to be connected to a trained counselor who will discuss the best methods to help you quit.     Before your surgery:    Please stop all supplements 7 days prior to surgery. Or as directed by your surgeon.   Please stop taking NSAID pain medicine such as Advil and Motrin 7 days before surgery.    If you develop any fever, cough, cold, rashes, cuts, scratches, scrapes, urinary symptoms or infection anywhere on your body (including teeth and gums) prior to surgery, please call your surgeon’s office as soon as  possible. This may require treatment to reduce the chance of cancellation on the day of surgery.    The day before your surgery:   Get a good night’s rest.  Use the special soap for bathing if you have been instructed to use one.    Scheduled surgery times may change and you will be notified if this occurs - please check your personal voicemail for any updates.     On the morning of surgery:   Wear comfortable, loose fitting clothes which open in the front. Please do not wear moisturizers, creams, lotions, makeup or perfume.    Please bring with you to surgery:   Photo ID and insurance card   Current list of medicines and allergies   Pacemaker/ Defibrillator/Heart stent cards   CPAP machine and mask    Slings/ splints/ crutches   A copy of your complete advanced directive/DHPOA.    Please do NOT bring with you to surgery:   All jewelry and valuables should be left at home.   Prosthetic devices such as contact lenses, hearing aids, dentures, eyelash extensions, hairpins and body piercings must be removed prior to going in to the surgical suite.    After outpatient surgery:   A responsible adult MUST accompany you at the time of discharge and stay with you for 24 hours after your surgery. You may NOT drive yourself home after surgery.    Do not drive, operate machinery, make critical decisions or do activities that require co-ordination or balance until after a night’s sleep.   Do not drink alcoholic beverages for 24 hours.   Instructions for resuming your medications will be provided by your surgeon.    CALL YOUR DOCTOR AFTER SURGERY IF YOU HAVE:     Chills and/or a fever of 101° F or higher.    Redness, swelling, pus or drainage from your surgical wound or a bad smell from the wound.    Lightheadedness, fainting or confusion.    Persistent vomiting (throwing up) and are not able to eat or drink for 12 hours.    Three or more loose, watery bowel movements in 24 hours (diarrhea).   Difficulty or pain while urinating(  after non-urological surgery)    Pain and swelling in your legs, especially if it is only on one side.    Difficulty breathing or are breathing faster than normal.    Any new concerning symptoms.        Preoperative Fasting Guidelines    Why must I stop eating and drinking near surgery time?  With sedation, food or liquid in your stomach can enter your lungs causing serious complications  Increases nausea and vomiting    When do I need to stop eating and drinking before my surgery?  Do not eat any food after midnight the night before your surgery/procedure.  You may have up to TEN ounces of clear liquid until TWO hours before your instructed arrival time to the hospital.  This includes water, black tea/coffee, (no milk or cream) apple juice, and electrolyte drinks (Gatorade)  You may chew gum until TWO hours before your surgery/procedure     Medication List            Accurate as of April 3, 2024 12:33 PM. Always use your most recent med list.                ALPRAZolam 1 mg tablet  Commonly known as: Xanax  TAKE ONE TABLET BY MOUTH TWICE DAILY AS NEEDED FOR ANXIETY FOR UP TO 15 DAYS.  Medication Adjustments for Surgery: Take morning of surgery with sip of water, no other fluids     atorvastatin 40 mg tablet  Commonly known as: Lipitor  Medication Adjustments for Surgery: Take morning of surgery with sip of water, no other fluids     busPIRone 15 mg tablet  Commonly known as: Buspar  Medication Adjustments for Surgery: Take morning of surgery with sip of water, no other fluids     esomeprazole 40 mg DR capsule  Commonly known as: NexIUM  Medication Adjustments for Surgery: Take morning of surgery with sip of water, no other fluids     lisinopril 20 mg tablet  Medication Adjustments for Surgery: Other (Comment)  Notes to patient: HOLD DAY OF SURGERY     QUEtiapine 100 mg tablet  Commonly known as: SEROquel  TAKE ONE TABLET BY MOUTH AT BEDTIME  Medication Adjustments for Surgery: Other (Comment)  Notes to patient:  CONTINUE PER USUAL/TAKE NIGHT BEFORE SURGERY     sertraline 100 mg tablet  Commonly known as: Zoloft  Medication Adjustments for Surgery: Take morning of surgery with sip of water, no other fluids     triamcinolone 0.1 % cream  Commonly known as: Kenalog  Medication Adjustments for Surgery: Other (Comment)  Notes to patient: CONTINUE IF NEEDED

## 2024-04-07 DIAGNOSIS — F41.9 ANXIETY AND DEPRESSION: ICD-10-CM

## 2024-04-07 DIAGNOSIS — F32.A ANXIETY AND DEPRESSION: ICD-10-CM

## 2024-04-08 RX ORDER — ALPRAZOLAM 1 MG/1
TABLET ORAL
Qty: 30 TABLET | Refills: 0 | Status: SHIPPED | OUTPATIENT
Start: 2024-04-08 | End: 2024-05-02

## 2024-04-09 ENCOUNTER — ANESTHESIA EVENT (OUTPATIENT)
Dept: OPERATING ROOM | Facility: HOSPITAL | Age: 39
End: 2024-04-09
Payer: COMMERCIAL

## 2024-04-10 ENCOUNTER — ANESTHESIA (OUTPATIENT)
Dept: OPERATING ROOM | Facility: HOSPITAL | Age: 39
End: 2024-04-10
Payer: COMMERCIAL

## 2024-04-10 ENCOUNTER — APPOINTMENT (OUTPATIENT)
Dept: RADIOLOGY | Facility: HOSPITAL | Age: 39
End: 2024-04-10
Payer: COMMERCIAL

## 2024-04-10 ENCOUNTER — HOSPITAL ENCOUNTER (OUTPATIENT)
Facility: HOSPITAL | Age: 39
Setting detail: OUTPATIENT SURGERY
Discharge: HOME | End: 2024-04-10
Attending: PODIATRIST | Admitting: PODIATRIST
Payer: COMMERCIAL

## 2024-04-10 ENCOUNTER — PHARMACY VISIT (OUTPATIENT)
Dept: PHARMACY | Facility: CLINIC | Age: 39
End: 2024-04-10
Payer: COMMERCIAL

## 2024-04-10 VITALS
OXYGEN SATURATION: 98 % | HEART RATE: 64 BPM | DIASTOLIC BLOOD PRESSURE: 81 MMHG | RESPIRATION RATE: 16 BRPM | SYSTOLIC BLOOD PRESSURE: 112 MMHG | TEMPERATURE: 97.2 F

## 2024-04-10 DIAGNOSIS — Z98.890 POSTOPERATIVE STATE: Primary | ICD-10-CM

## 2024-04-10 DIAGNOSIS — G89.18 ACUTE POSTOPERATIVE PAIN: ICD-10-CM

## 2024-04-10 PROCEDURE — 7100000002 HC RECOVERY ROOM TIME - EACH INCREMENTAL 1 MINUTE: Performed by: PODIATRIST

## 2024-04-10 PROCEDURE — 7100000009 HC PHASE TWO TIME - INITIAL BASE CHARGE: Performed by: PODIATRIST

## 2024-04-10 PROCEDURE — 2500000004 HC RX 250 GENERAL PHARMACY W/ HCPCS (ALT 636 FOR OP/ED)

## 2024-04-10 PROCEDURE — 2500000004 HC RX 250 GENERAL PHARMACY W/ HCPCS (ALT 636 FOR OP/ED): Performed by: ANESTHESIOLOGIST ASSISTANT

## 2024-04-10 PROCEDURE — A27610 PR EXPLORE/TREAT ANKLE JOINT: Performed by: ANESTHESIOLOGIST ASSISTANT

## 2024-04-10 PROCEDURE — 7100000010 HC PHASE TWO TIME - EACH INCREMENTAL 1 MINUTE: Performed by: PODIATRIST

## 2024-04-10 PROCEDURE — 3600000009 HC OR TIME - EACH INCREMENTAL 1 MINUTE - PROCEDURE LEVEL FOUR: Performed by: PODIATRIST

## 2024-04-10 PROCEDURE — 2500000005 HC RX 250 GENERAL PHARMACY W/O HCPCS: Performed by: PODIATRIST

## 2024-04-10 PROCEDURE — 3700000001 HC GENERAL ANESTHESIA TIME - INITIAL BASE CHARGE: Performed by: PODIATRIST

## 2024-04-10 PROCEDURE — RXMED WILLOW AMBULATORY MEDICATION CHARGE

## 2024-04-10 PROCEDURE — 2780000003 HC OR 278 NO HCPCS: Performed by: PODIATRIST

## 2024-04-10 PROCEDURE — 3700000002 HC GENERAL ANESTHESIA TIME - EACH INCREMENTAL 1 MINUTE: Performed by: PODIATRIST

## 2024-04-10 PROCEDURE — 7100000001 HC RECOVERY ROOM TIME - INITIAL BASE CHARGE: Performed by: PODIATRIST

## 2024-04-10 PROCEDURE — A27610 PR EXPLORE/TREAT ANKLE JOINT: Performed by: ANESTHESIOLOGY

## 2024-04-10 PROCEDURE — 3600000004 HC OR TIME - INITIAL BASE CHARGE - PROCEDURE LEVEL FOUR: Performed by: PODIATRIST

## 2024-04-10 PROCEDURE — C1762 CONN TISS, HUMAN(INC FASCIA): HCPCS | Performed by: PODIATRIST

## 2024-04-10 PROCEDURE — 2720000007 HC OR 272 NO HCPCS: Performed by: PODIATRIST

## 2024-04-10 DEVICE — IMPLANTABLE DEVICE: Type: IMPLANTABLE DEVICE | Site: ANKLE | Status: FUNCTIONAL

## 2024-04-10 RX ORDER — ONDANSETRON HYDROCHLORIDE 2 MG/ML
4 INJECTION, SOLUTION INTRAVENOUS ONCE AS NEEDED
Status: DISCONTINUED | OUTPATIENT
Start: 2024-04-10 | End: 2024-04-10 | Stop reason: HOSPADM

## 2024-04-10 RX ORDER — IPRATROPIUM BROMIDE 0.5 MG/2.5ML
500 SOLUTION RESPIRATORY (INHALATION) ONCE AS NEEDED
Status: DISCONTINUED | OUTPATIENT
Start: 2024-04-10 | End: 2024-04-10 | Stop reason: HOSPADM

## 2024-04-10 RX ORDER — MIDAZOLAM HYDROCHLORIDE 1 MG/ML
INJECTION, SOLUTION INTRAMUSCULAR; INTRAVENOUS AS NEEDED
Status: DISCONTINUED | OUTPATIENT
Start: 2024-04-10 | End: 2024-04-10

## 2024-04-10 RX ORDER — LIDOCAINE HYDROCHLORIDE 10 MG/ML
INJECTION, SOLUTION INTRAVENOUS AS NEEDED
Status: DISCONTINUED | OUTPATIENT
Start: 2024-04-10 | End: 2024-04-10

## 2024-04-10 RX ORDER — MEPERIDINE HYDROCHLORIDE 25 MG/ML
12.5 INJECTION INTRAMUSCULAR; INTRAVENOUS; SUBCUTANEOUS EVERY 10 MIN PRN
Status: DISCONTINUED | OUTPATIENT
Start: 2024-04-10 | End: 2024-04-10 | Stop reason: HOSPADM

## 2024-04-10 RX ORDER — FENTANYL CITRATE 50 UG/ML
INJECTION, SOLUTION INTRAMUSCULAR; INTRAVENOUS AS NEEDED
Status: DISCONTINUED | OUTPATIENT
Start: 2024-04-10 | End: 2024-04-10

## 2024-04-10 RX ORDER — ONDANSETRON HYDROCHLORIDE 2 MG/ML
INJECTION, SOLUTION INTRAVENOUS AS NEEDED
Status: DISCONTINUED | OUTPATIENT
Start: 2024-04-10 | End: 2024-04-10

## 2024-04-10 RX ORDER — SODIUM CHLORIDE, SODIUM LACTATE, POTASSIUM CHLORIDE, CALCIUM CHLORIDE 600; 310; 30; 20 MG/100ML; MG/100ML; MG/100ML; MG/100ML
100 INJECTION, SOLUTION INTRAVENOUS CONTINUOUS
Status: DISCONTINUED | OUTPATIENT
Start: 2024-04-10 | End: 2024-04-10 | Stop reason: HOSPADM

## 2024-04-10 RX ORDER — BUPIVACAINE HYDROCHLORIDE 5 MG/ML
INJECTION, SOLUTION PERINEURAL AS NEEDED
Status: DISCONTINUED | OUTPATIENT
Start: 2024-04-10 | End: 2024-04-10 | Stop reason: HOSPADM

## 2024-04-10 RX ORDER — SODIUM CHLORIDE, SODIUM LACTATE, POTASSIUM CHLORIDE, CALCIUM CHLORIDE 600; 310; 30; 20 MG/100ML; MG/100ML; MG/100ML; MG/100ML
50 INJECTION, SOLUTION INTRAVENOUS CONTINUOUS
Status: DISCONTINUED | OUTPATIENT
Start: 2024-04-10 | End: 2024-04-10 | Stop reason: HOSPADM

## 2024-04-10 RX ORDER — HYDROMORPHONE HYDROCHLORIDE 2 MG/ML
INJECTION, SOLUTION INTRAMUSCULAR; INTRAVENOUS; SUBCUTANEOUS AS NEEDED
Status: DISCONTINUED | OUTPATIENT
Start: 2024-04-10 | End: 2024-04-10

## 2024-04-10 RX ORDER — ONDANSETRON 4 MG/1
8 TABLET, FILM COATED ORAL EVERY 8 HOURS PRN
Qty: 15 TABLET | Refills: 0 | Status: SHIPPED | OUTPATIENT
Start: 2024-04-10 | End: 2024-04-15

## 2024-04-10 RX ORDER — PROPOFOL 10 MG/ML
INJECTION, EMULSION INTRAVENOUS AS NEEDED
Status: DISCONTINUED | OUTPATIENT
Start: 2024-04-10 | End: 2024-04-10

## 2024-04-10 RX ORDER — ALBUTEROL SULFATE 0.83 MG/ML
2.5 SOLUTION RESPIRATORY (INHALATION) ONCE AS NEEDED
Status: DISCONTINUED | OUTPATIENT
Start: 2024-04-10 | End: 2024-04-10 | Stop reason: HOSPADM

## 2024-04-10 RX ORDER — CEFAZOLIN SODIUM IN 0.9 % NACL 3 G/100 ML
3 INTRAVENOUS SOLUTION, PIGGYBACK (ML) INTRAVENOUS ONCE
Status: COMPLETED | OUTPATIENT
Start: 2024-04-10 | End: 2024-04-10

## 2024-04-10 RX ORDER — LABETALOL HYDROCHLORIDE 5 MG/ML
5 INJECTION, SOLUTION INTRAVENOUS ONCE AS NEEDED
Status: DISCONTINUED | OUTPATIENT
Start: 2024-04-10 | End: 2024-04-10 | Stop reason: HOSPADM

## 2024-04-10 RX ORDER — OXYCODONE AND ACETAMINOPHEN 5; 325 MG/1; MG/1
1 TABLET ORAL EVERY 6 HOURS PRN
Qty: 28 TABLET | Refills: 0 | Status: SHIPPED | OUTPATIENT
Start: 2024-04-10 | End: 2024-04-17

## 2024-04-10 RX ADMIN — FENTANYL CITRATE 25 MCG: 0.05 INJECTION, SOLUTION INTRAMUSCULAR; INTRAVENOUS at 08:17

## 2024-04-10 RX ADMIN — FENTANYL CITRATE 50 MCG: 0.05 INJECTION, SOLUTION INTRAMUSCULAR; INTRAVENOUS at 08:13

## 2024-04-10 RX ADMIN — MIDAZOLAM HYDROCHLORIDE 2 MG: 1 INJECTION, SOLUTION INTRAMUSCULAR; INTRAVENOUS at 08:07

## 2024-04-10 RX ADMIN — PROPOFOL 200 MG: 10 INJECTION, EMULSION INTRAVENOUS at 08:13

## 2024-04-10 RX ADMIN — HYDROMORPHONE HYDROCHLORIDE 0.6 MG: 2 INJECTION INTRAMUSCULAR; INTRAVENOUS; SUBCUTANEOUS at 08:29

## 2024-04-10 RX ADMIN — Medication 3 G: at 08:07

## 2024-04-10 RX ADMIN — ONDANSETRON HYDROCHLORIDE 4 MG: 2 INJECTION INTRAMUSCULAR; INTRAVENOUS at 08:45

## 2024-04-10 RX ADMIN — FENTANYL CITRATE 25 MCG: 0.05 INJECTION, SOLUTION INTRAMUSCULAR; INTRAVENOUS at 08:20

## 2024-04-10 RX ADMIN — SODIUM CHLORIDE, POTASSIUM CHLORIDE, SODIUM LACTATE AND CALCIUM CHLORIDE: 600; 310; 30; 20 INJECTION, SOLUTION INTRAVENOUS at 08:07

## 2024-04-10 RX ADMIN — DEXAMETHASONE SODIUM PHOSPHATE 8 MG: 4 INJECTION, SOLUTION INTRAMUSCULAR; INTRAVENOUS at 08:17

## 2024-04-10 RX ADMIN — LIDOCAINE HYDROCHLORIDE 30 MG: 10 INJECTION, SOLUTION INTRAVENOUS at 08:13

## 2024-04-10 SDOH — HEALTH STABILITY: MENTAL HEALTH: CURRENT SMOKER: 1

## 2024-04-10 ASSESSMENT — PAIN SCALES - GENERAL
PAINLEVEL_OUTOF10: 0 - NO PAIN
PAINLEVEL_OUTOF10: 6
PAINLEVEL_OUTOF10: 8
PAIN_LEVEL: 0

## 2024-04-10 ASSESSMENT — COLUMBIA-SUICIDE SEVERITY RATING SCALE - C-SSRS
6. HAVE YOU EVER DONE ANYTHING, STARTED TO DO ANYTHING, OR PREPARED TO DO ANYTHING TO END YOUR LIFE?: NO
2. HAVE YOU ACTUALLY HAD ANY THOUGHTS OF KILLING YOURSELF?: NO
1. IN THE PAST MONTH, HAVE YOU WISHED YOU WERE DEAD OR WISHED YOU COULD GO TO SLEEP AND NOT WAKE UP?: NO

## 2024-04-10 ASSESSMENT — PAIN - FUNCTIONAL ASSESSMENT
PAIN_FUNCTIONAL_ASSESSMENT: 0-10

## 2024-04-10 ASSESSMENT — PAIN DESCRIPTION - DESCRIPTORS: DESCRIPTORS: ACHING;DISCOMFORT

## 2024-04-10 NOTE — OP NOTE
PODIATRIC OPERATIVE REPORT    LOG ID: [id number]  SURGERY/PROCEDURE DATE: 4/10/2024  OR LOCATION: [facility]    SURGEON(S)/PROCEDURALIST(S) AND ASSISTANT(S):  Primary: He Durant DPM  Fellow: SIMI Marie DPM PGY-2 - Resident  Juan Wills DPM PGY-2 - Resident    OTHER OR STAFF:  Circulator: Misael Steve RN  Scrub Person: Bonny Diallo    SURGERY/PROCEDURE(S):  1) Open right ankle arthrotomy (02359)  2) Excision of bone spur of right tibia (22005)  3) Excision of bone spur of right talus (30050)  4) Use of intra-operative fluoroscopy (32478)      PRE-OP/PRE-PROCEDURE DIAGNOSIS:   Pre-op Diagnosis  1) Right ankle pain, unspecified chronicity [M25.571]  2) Osteophyte of right tibia [M25.771]  3) Osteophyte of right talus [M25.771]  4) Osteoarthrosis, localized, secondary, involving ankle and foot, right [M19.271]  5) Localized edema, right ankle [R60.0]  6) Difficulty walking [R26.2]    POST-OP/POST-PROCEDURE DIAGNOSIS: Same as Pre-Op    ANESTHESIA:   Anesthesia: Consult  ASA: II  Anesthesia Staff: Anesthesiologist: Aubrie Cantrell MD MPH  C-AA: NOLAN Gambino  Intra-op Medications:   Administrations occurring from 0730 to 0845 on 04/10/24:   Medication Name Total Dose   BUPivacaine HCl (Marcaine) 0.5 % (5 mg/mL) injection 20 mL   lactated Ringer's infusion Cannot be calculated   ceFAZolin in 0.9% sodium chloride (Ancef) IVPB 3 g 3 g       ESTIMATED BLOOD LOSS: Minimal    SPECIMENS: No specimens collected during this procedure.    IMPLANTABLE DEVICES:  Implant Name Type Inv. Item Serial No.  Lot No. LRB No. Used Action   PATCH, DECELLULARIZED DERMIS, 25 X 30MM - [id number] Implant PATCH, DECELLULARIZED DERMIS, 25 X 30MM [id number] [facility] [id number] Right 1 Implanted       FINDINGS: Intraoperative findings consistent with clinical and radiographic findings.    DRAINS: None    TOURNIQUET TIMES:   Total Tourniquet Time Documented:  Thigh (Right) - 22 minutes  Total: Thigh  "(Right) - 22 minutes      COMPLICATIONS: None; patient tolerated the procedure well.       SURGERY/PROCEDURE DETAILS:  INDICATIONS FOR PROCEDURE:   The patient with diagnosis as outlined above presents for podiatric surgical intervention today. The patient has attempted and failed conservative treatment as outlined in preoperative clinic notes and wishes to proceed with surgical intervention at this time. The nature of the deformity, problems anticipated procedures, recovery/convalescence, risks/complications including but not limited to numbness, CRPS, over/under correction, problems healing of soft tissue or bone, postoperative wound infection, wound dehiscence, DVT and/or persistent pain/disability have been explained to the patient in detail. An updated H&P and consent have been completed prior to today’s surgical intervention. The patient states that they have been NPO since midnight. No guarantees were given or implied, but it is expected that the patient will have a favorable outcome.  It is with this understanding that we proceed.     PRE-PROCEDURE INFORMATION:  In pre-op holding area, the Right extremity to be operated on was clearly marked and the patient verified correct laterality of the marking.  The patient was brought to the operating room and placed on the operating table in the supine position.  A timeout was performed in which identification of the correct patient, procedure, location, and materials was done. A pneumatic pneumatic 30\" tourniquet was placed on the patient's thigh. Following MAC sedation, local anesthesia was obtained utilizing 14cc of 0.5% Marcaine Plain. The Right foot and ankle was then scrubbed, prepped and draped in the usual aseptic manner. An Esmarch bandage was then utilized to exsanguinate the patient's right foot and the tourniquet was inflated to 300 mmHg.    DESCRIPTION OF PROCEDURE:   Procedure 1: Open right ankle arthrotomy (47075)  Attention was then directed towards " the Right lower extremity. An ankle arthrotomy incision was mapped out using a skin marker just medial to the anterior tibialis tendon. Fluoroscopy was utilized to confirm this incision was over the medial ankle gutter to allow for exposure to the medial gutter bone spurs. Partial thickness incision was made through the skin of the previously marked incision with at #15 blade. A mosquito hemostat was used to spread the incision and bluntly dissect through subcutaneous tissue and down to the ankle joint capsule. Arthrotomy was performed with the hemostat.     Procedure 2: Excision of bone spur of right tibia (81430)  Attention was directed to the anteromedial right ankle incision, where the mosquito hemostat was removed and the Arthrex MIS indu was placed into the incision and confirmed to be in the medial ankle gutter via fluoroscopy. Under fluoroscopic guidance, the spurring from the medial malleolus was excisionally debrided using the indu. Care was taken to avoid contact of the indu with the total ankle implant. The joint and incision was flushed with copious amounts of normal saline.     Procedure 3: Excision of bone spur of right talus (23086)  Attention was directed to the anteromedial right ankle incision, where the the Arthrex MIS indu was directed to the lateral spurring from the talus within the medial ankle gutter. Under fluoroscopic guidance, the spurring from the lateral talus was excisionally debrided using the indu. Care was taken to avoid contact of the indu with the total ankle implant. The joint and incision was flushed with copious amounts of normal saline. Attention was again directed to the anteromedial right ankle incision, where a mosquito hemostat was inserted and the arthrotomy was again dilated in preparation for placement of the ArthroFlex decellularized dermal matrix graft. The graft was carefully removed from its packaging and passed into the incision and arthrotomy with the assistance  of hemostats and a freer. The graft was positioned into the medial ankle gutter over the sites of bone spur resection to help prevent reformation of bone spurs in this region. The ankle joint was again moved through its range of motion and was noted to move smoothly.    Procedure 4: Use of intra-operative fluoroscopy (14702)  Fluoroscopy was used throughout the entire procedure to aid in identification of osseous landmarks, adequate deformity reduction visualization, and proper placement of all hardware. All imaging was interpreted at the time of obtianing the image. All staff members, although protected with certified lead, were exposed to radiation throughout the procedure.    All surgical wounds were irrigated copiously with saline and closed in layers with the 3-0 Monocryl and 2-0 Prolene. Dressing was placed on the surgical extremity consisting of 2-inch border foam dressing. Patient will return to his CAM boot and will bear weight as tolerated until further advised at post-operative follow-up.    POSTOPERATIVE INFORMATION: The patient tolerated the above noted procedure and anesthesia well and was transferred to the PACU with vital signs stable, and vascular status intact with capillary refill intact to the most distal aspect of the operative extremity. Postoperative instructions reviewed in detail with the patient with written instructions provided. Patient will return to clinic in approximately 3-5 days for first postoperative visit. Patient has the number of the clinic and was instructed to call prior to that time should any problems, questions, or concerns arise.    This is Bruno Alcantara DPM dictating the operative note for Dr. Bhargav DPM.      SIGNATURE: Bruno Alcantara DPM PATIENT NAME: Guy Moreland   DATE: April 10, 2024 MRN: [id number]   TIME: 10:19 AM

## 2024-04-10 NOTE — ANESTHESIA PROCEDURE NOTES
Airway  Date/Time: 4/10/2024 8:14 AM  Urgency: elective    Airway not difficult    Staffing  Performed: NOLAN   Authorized by: Aubrie Cantrell MD MPH    Performed by: NOLAN Gambino  Patient location during procedure: OR    Indications and Patient Condition  Indications for airway management: anesthesia  Spontaneous Ventilation: absent  Sedation level: deep  Preoxygenated: yes  Patient position: sniffing  Mask difficulty assessment: 0 - not attempted    Final Airway Details  Final airway type: supraglottic airway      Successful airway: Size 4     Number of attempts at approach: 1    Additional Comments  EASY, ATRAUMATIC LMA PLACEMENT TO GOOD SEAL  SOFT BITE BLOCK PLACED

## 2024-04-10 NOTE — ANESTHESIA PROCEDURE NOTES
Peripheral Block    Patient location during procedure: pre-op  Start time: 4/10/2024 8:01 AM  End time: 4/10/2024 8:03 AM  Reason for block: at surgeon's request and post-op pain management  Staffing  Performed: attending   Authorized by: Aubrie Cantrell MD MPH    Performed by: Aubrie Cantrell MD MPH  Preanesthetic Checklist  Completed: patient identified, IV checked, site marked, risks and benefits discussed, surgical consent, monitors and equipment checked, pre-op evaluation and timeout performed   Timeout performed at: 4/10/2024 7:47 AM  Peripheral Block  Patient position: sitting  Prep: ChloraPrep  Patient monitoring: heart rate  Block type: adductor canal  Laterality: left  Injection technique: single-shot  Guidance: ultrasound guided  Local infiltration: ropivacaine  Infiltration strength: 0.5 %  Dose: 10 mL  Needle  Needle gauge: 21 G  Needle length: 10 cm  Needle localization: ultrasound guidance     image stored in chart  Assessment  Injection assessment: negative aspiration for heme, no paresthesia on injection, incremental injection and local visualized surrounding nerve on ultrasound  Slow fractionated injection: yes

## 2024-04-10 NOTE — ANESTHESIA POSTPROCEDURE EVALUATION
Patient: Guy Moreland    Procedure Summary       Date: 04/10/24 Room / Location: [facility] / [facility]    Anesthesia Start: 0807 Anesthesia Stop: 0900    Procedures:       Ankle Arthrotomy, Manual Manipulation of Ankle Joint under Fluoroscopy, Excision of Bone Spur of Tibia (Right: Ankle)      Application of Amniotic Graft (Right: Ankle) Diagnosis:       Right ankle pain, unspecified chronicity      Osteophyte of right ankle      Osteoarthrosis, localized, secondary, involving ankle and foot, right      Difficulty walking      (Pain in right ankle joint. Bone spursof tibia/talus, right. Secondary osteoarthritis of right ankle. Difficultly walking.)    Surgeons: He Durant DPM Responsible Provider: Aubrie Cantrell MD MPH    Anesthesia Type: general ASA Status: 2            Anesthesia Type: general    Vitals Value Taken Time   /81 04/10/24 1001   Temp 36.2 °C (97.2 °F) 04/10/24 0945   Pulse 68 04/10/24 1002   Resp 11 04/10/24 1002   SpO2 95 % 04/10/24 1002   Vitals shown include unfiled device data.    Anesthesia Post Evaluation    Patient location during evaluation: PACU  Patient participation: complete - patient participated  Level of consciousness: awake and alert  Pain score: 0  Pain management: adequate  Multimodal analgesia pain management approach  Airway patency: patent  Two or more strategies used to mitigate risk of obstructive sleep apnea  Cardiovascular status: acceptable  Respiratory status: acceptable  Hydration status: acceptable  Postoperative Nausea and Vomiting: none    No notable events documented.

## 2024-04-10 NOTE — ADDENDUM NOTE
Addendum  created 04/10/24 1050 by Aubrie Cantrell MD MPH    Child order released for a procedure order, Clinical Note Signed, Delete clinical note, Intraprocedure Blocks edited, Order Canceled from Note, SmartForm saved

## 2024-04-10 NOTE — POST-PROCEDURE NOTE
1004-REPORT RECEIVED.  I BROUGHT PATIENT OVER FROM PACU.  HE IS ALERT AND AWAKE.  RLE DRESSING IS C/D/I, NO DRAINAGE, ELEVATED AND ICE BEHIND THE KNEE.  PAIN IS 6/10.  SNACK PROVIDED.  MOTHER BROUGHT BACK TO ROOM.      1020-TOLERATING SNACK WIHTOUT N&V.  RX TO GO DELIVERED MEDS.    1025-DISCHARGE INSTRUCTIONS REVIEWED, VERBALIZED UNDERSTANDING.  PATIENT SAYS HE HAS CAM BOOT AND ASSISTIVE DEVICES AT HOME TO HELP HIM WALK IF NEEDED.

## 2024-04-10 NOTE — DISCHARGE INSTRUCTIONS
PODIATRIC SURGERY POST-OP INSTRUCTIONS    YOU HAD ANESTHESIA:  You must have a responsible adult drive you home and stay with you for the first 24 hours.    Do not drive a car or drink any alcohol for 24 hours after surgery.  Do not do any strenuous work or activity for the next 24 hours.  You may have mild nausea, a sore throat or raspy voice for a few days.    You received a local numbing block to the foot after your surgery. You should expect the surgical extremity to remain numb for the next 6-12 hours.    POST-OP INSTRUCTIONS:  Keep dressing clean, dry and intact until your first post-operative appointment.  Do not remove surgical dressing. You may need to loosen if necessary.   Do not shower unless using a cast protector to protect dressing.  If dressing gets wet, please call office immediately as this can lead to increased risk of infection or wound dehiscence.   Elevate surgical extremity as much as possible to help with pain and swelling.  Place ice pack behind knee of surgical extremity (20 minutes on/20 minutes off while awake). After 24 hours use ice behind the knee as needed for comfort.  Weight Bearing Status: Please remain weight bearing as tolerated to the surgical extremity with CAM boot in place utilizing crutches, walker, or knee scooter as needed.  Please resume all home medications.   Post-Operative Medications: You were prescribed Percocet for pain; please take as directed on the label. You may take Ibuprofen for pain; please take as directed on bottle. You were prescribed Zofran (Ondansetron) 8 mg for post-operative nausea; please take as needed and directed on the label.  For your pain medication, take as needed; wean off as soon as tolerated. In the event you have constipation, you may take over the counter stool softeners and laxatives as needed.  Post-operative appointment with Dr. Durant for Tuesday 4/16/24 at 10:00am in [place].   If you have any problems or notice any unusual symptoms  such as bleeding, excessive pain, fever, redness, swelling and/or discharge please call your Dr. Durant's office at [phone].     WHEN TO CALL YOUR DOCTOR:  Fever (>100.4°F or 38.0°C) or chills.  Incision problems such as redness, warmth, swelling, or foul smelling drainage.  Severe nausea or persistent vomiting.  Pain and swelling in your legs, especially if it is only on one side and not the other.  Pain with urination, cloudy urine, or foul smelling urine.  Or if you have any other problems or questions.  CALL 911 or go to the ED if you have any shortness of breath, difficulty breathing, or chest pain.

## 2024-04-10 NOTE — ANESTHESIA PREPROCEDURE EVALUATION
Patient: Guy Moreland    Procedure Information       Date/Time: 04/10/24 0730    Procedures:       Ankle Arthrotomy, Manual Manipulation of Ankle Joint under Fluoroscopy, Excision of Bone Spur of Tibia (Right: Ankle)      Application of Amniotic Graft (Right: Ankle) - C-ARM, Arthrex    Location: [facility] / [facility]    Surgeons: He Durant DPM            Relevant Problems   Anesthesia (within normal limits)      Cardiac   (+) Essential hypertension   (+) Hyperlipidemia   (+) Hypertension      Pulmonary  Smoker, occ MJ; quit cigarettes 5 yrs ago      Neuro   (+) Anxiety and depression   (+) Panic attack      GI   (+) GERD (gastroesophageal reflux disease)   (+) IBS (irritable bowel syndrome)      /Renal   (+) Recurrent UTI      Liver   (+) Fatty liver      Endocrine   (+) Obesity      Hematology (within normal limits)      Musculoskeletal (within normal limits)      HEENT (within normal limits)      ID   (+) Recurrent UTI      Skin   (+) Eczema      GYN (within normal limits)     Past Surgical History:   Procedure Laterality Date   • ABDOMINAL SURGERY     • ANKLE SURGERY  01/04/2023    Ankle Surgery   • COLONOSCOPY  06/05/2017    Colonoscopy (Fiberoptic)   • HERNIA REPAIR  09/21/2020    Hernia Repair   • OTHER SURGICAL HISTORY  06/05/2017    Incision And Drainage Of Skin Abscess Buttocks       Clinical information reviewed:   Tobacco  Allergies  Meds   Med Hx  Surg Hx   Fam Hx  Soc Hx        NPO Detail:  NPO/Void Status  Carbohydrate Drink Given Prior to Surgery? : N  Date of Last Liquid: 04/10/24  Time of Last Liquid: 0500  Date of Last Solid: 04/09/24  Time of Last Solid: 1700  Last Intake Type: Clear fluids  Time of Last Void: 0500         Physical Exam    Airway  Mallampati: II  TM distance: >3 FB  Neck ROM: full     Cardiovascular - normal exam     Dental    Pulmonary - normal exam     Abdominal - normal exam         Anesthesia Plan    History of general anesthesia?: yes  History of  complications of general anesthesia?: no    ASA 2     general   (LMA  D/w pt and surgical team poss post-op block in PACU)  The patient is a current smoker.  Patient was previously instructed to abstain from smoking on day of procedure.  Patient did not smoke on day of procedure.  Education provided regarding risk of obstructive sleep apnea.  intravenous induction   Postoperative administration of opioids is intended.  Trial extubation is planned.  Anesthetic plan and risks discussed with patient.  Use of blood products discussed with patient who consented to blood products.    Plan discussed with CRNA and CAA.

## 2024-04-10 NOTE — BRIEF OP NOTE
Date: 4/10/2024  OR Location: [facility]    Name: Guy Moreland, : 1985, Age: 38 y.o., MRN: [id number], Sex: male    Diagnosis  Pre-op Diagnosis     * Right ankle pain, unspecified chronicity [M25.571]     * Osteophyte of right ankle [M25.771]     * Osteoarthrosis, localized, secondary, involving ankle and foot, right [M19.271]     * Difficulty walking [R26.2] Post-op Diagnosis     * Right ankle pain, unspecified chronicity [M25.571]     * Osteophyte of right ankle [M25.771]     * Osteoarthrosis, localized, secondary, involving ankle and foot, right [M19.271]     * Difficulty walking [R26.2]     Procedures  Ankle Arthrotomy, Manual Manipulation of Ankle Joint under Fluoroscopy, Excision of Bone Spur of Tibia  13107 - [place] ARTHROPLASTY ANKLE    Ankle Arthrotomy, Manual Manipulation of Ankle Joint under Fluoroscopy, Excision of Bone Spur of Tibia  99194 - [place] MANIPULATION ANKLE UNDER GENERAL ANESTHESIA    Ankle Arthrotomy, Manual Manipulation of Ankle Joint under Fluoroscopy, Excision of Bone Spur of Tibia  15270 - [place] EXCISION/CURETTAGE BONE CYST/TUMOR TIBIA/FIBULA    Application of Amniotic Graft  30051 - [place] SPLIT AGRFT T/A/L 1ST 100 CM/&/1% BDY INFT/CHLD      Surgeons      * He Durant - Primary    Resident/Fellow/Other Assistant:  Surgeons and Role:     * Stephen Anne DPM - Fellow  Assistants:  Juan Wills DPM PGY-2 - Resident  Bruno Alcantara DPM PGY-2 - Resident        Procedure Summary  Anesthesia: Consult  ASA: II  Anesthesia Staff: Anesthesiologist: Aubrie Cantrell MD MPH  C-AA: NOLAN Gambino  Estimated Blood Loss: 1mL  Intra-op Medications:   Administrations occurring from 0730 to 0845 on 04/10/24:   Medication Name Total Dose   BUPivacaine HCl (Marcaine) 0.5 % (5 mg/mL) injection 20 mL   lactated Ringer's infusion Cannot be calculated   ceFAZolin in 0.9% sodium chloride (Ancef) IVPB 3 g 3 g              Anesthesia Record               Intraprocedure I/O Totals          Intake    lactated  Ringer's infusion 900.00 mL    ceFAZolin in 0.9% sodium chloride (Ancef) IVPB 3 g 100.00 mL    Total Intake 1000 mL          Specimen: No specimens collected     Staff:   Circulator: Misael Steve RN  Scrub Person: Bonny Diallo          Findings: Consistent with clinical and radiographic findings. Please see operative report for details.     Complications:  None; patient tolerated the procedure well.     Disposition: PACU - hemodynamically stable.  Condition: stable  Specimens Collected: No specimens collected    Bruno Alcantara DPM PGY-2  Podiatric Medicine and Surgery

## 2024-04-10 NOTE — ANESTHESIA PROCEDURE NOTES
Peripheral Block    Patient location during procedure: pre-op  Start time: 4/10/2024 7:50 AM  End time: 4/10/2024 7:54 AM  Reason for block: at surgeon's request and post-op pain management  Staffing  Performed: attending   Authorized by: Aubrie Cantrell MD MPH    Performed by: Aubrie Cantrell MD MPH  Preanesthetic Checklist  Completed: patient identified, IV checked, site marked, risks and benefits discussed, surgical consent, monitors and equipment checked, pre-op evaluation and timeout performed   Timeout performed at: 4/10/2024 7:47 AM  Peripheral Block  Patient position: laying flat (prone)  Prep: ChloraPrep  Patient monitoring: heart rate  Block type: popliteal  Laterality: left  Injection technique: single-shot  Guidance: nerve stimulator  Local infiltration: ropivacaine  Infiltration strength: 0.5 %  Dose: 20 mL  Needle  Needle gauge: 21 G  Needle length: 10 cm  Needle localization: nerve stimulator  Assessment  Injection assessment: negative aspiration for heme, no paresthesia on injection and incremental injection  Slow fractionated injection: yes

## 2024-04-16 DIAGNOSIS — E78.2 MIXED HYPERLIPIDEMIA: Primary | ICD-10-CM

## 2024-04-16 RX ORDER — ATORVASTATIN CALCIUM 40 MG/1
40 TABLET, FILM COATED ORAL DAILY
Qty: 90 TABLET | Refills: 3 | Status: SHIPPED | OUTPATIENT
Start: 2024-04-16

## 2024-04-29 DIAGNOSIS — F32.A ANXIETY AND DEPRESSION: ICD-10-CM

## 2024-04-29 DIAGNOSIS — F41.9 ANXIETY AND DEPRESSION: ICD-10-CM

## 2024-04-29 DIAGNOSIS — K21.9 GASTROESOPHAGEAL REFLUX DISEASE WITHOUT ESOPHAGITIS: Primary | ICD-10-CM

## 2024-04-30 RX ORDER — QUETIAPINE FUMARATE 100 MG/1
100 TABLET, FILM COATED ORAL NIGHTLY
Qty: 90 TABLET | Refills: 0 | Status: SHIPPED | OUTPATIENT
Start: 2024-04-30

## 2024-04-30 RX ORDER — ESOMEPRAZOLE MAGNESIUM 40 MG/1
40 CAPSULE, DELAYED RELEASE ORAL DAILY
Qty: 90 CAPSULE | Refills: 0 | Status: SHIPPED | OUTPATIENT
Start: 2024-04-30

## 2024-05-01 DIAGNOSIS — F41.9 ANXIETY AND DEPRESSION: ICD-10-CM

## 2024-05-01 DIAGNOSIS — F32.A ANXIETY AND DEPRESSION: ICD-10-CM

## 2024-05-02 RX ORDER — ALPRAZOLAM 1 MG/1
TABLET ORAL
Qty: 30 TABLET | Refills: 0 | Status: SHIPPED | OUTPATIENT
Start: 2024-05-02 | End: 2024-06-03 | Stop reason: SDUPTHER

## 2024-05-31 DIAGNOSIS — F32.A ANXIETY AND DEPRESSION: ICD-10-CM

## 2024-05-31 DIAGNOSIS — F41.9 ANXIETY AND DEPRESSION: ICD-10-CM

## 2024-06-03 ENCOUNTER — PATIENT MESSAGE (OUTPATIENT)
Dept: PRIMARY CARE | Facility: CLINIC | Age: 39
End: 2024-06-03

## 2024-06-03 ENCOUNTER — APPOINTMENT (OUTPATIENT)
Dept: PRIMARY CARE | Facility: CLINIC | Age: 39
End: 2024-06-03
Payer: COMMERCIAL

## 2024-06-03 DIAGNOSIS — F41.9 ANXIETY AND DEPRESSION: ICD-10-CM

## 2024-06-03 DIAGNOSIS — F32.A ANXIETY AND DEPRESSION: ICD-10-CM

## 2024-06-03 RX ORDER — BUSPIRONE HYDROCHLORIDE 15 MG/1
15 TABLET ORAL 2 TIMES DAILY
Qty: 180 TABLET | Refills: 0 | Status: SHIPPED | OUTPATIENT
Start: 2024-06-03

## 2024-06-03 RX ORDER — ALPRAZOLAM 1 MG/1
TABLET ORAL
Qty: 30 TABLET | Refills: 0 | OUTPATIENT
Start: 2024-06-03

## 2024-06-03 RX ORDER — ALPRAZOLAM 1 MG/1
TABLET ORAL
Qty: 30 TABLET | Refills: 0 | Status: SHIPPED | OUTPATIENT
Start: 2024-06-03

## 2024-06-10 ENCOUNTER — APPOINTMENT (OUTPATIENT)
Dept: ORTHOPEDIC SURGERY | Facility: CLINIC | Age: 39
End: 2024-06-10
Payer: COMMERCIAL

## 2024-06-19 DIAGNOSIS — T85.848A PAIN FROM IMPLANTED HARDWARE, INITIAL ENCOUNTER: Primary | ICD-10-CM

## 2024-06-25 ENCOUNTER — APPOINTMENT (OUTPATIENT)
Dept: PRIMARY CARE | Facility: CLINIC | Age: 39
End: 2024-06-25
Payer: COMMERCIAL

## 2024-06-25 VITALS
HEART RATE: 100 BPM | HEIGHT: 72 IN | SYSTOLIC BLOOD PRESSURE: 119 MMHG | WEIGHT: 270 LBS | DIASTOLIC BLOOD PRESSURE: 88 MMHG | OXYGEN SATURATION: 97 % | TEMPERATURE: 96.6 F | BODY MASS INDEX: 36.57 KG/M2

## 2024-06-25 DIAGNOSIS — I10 ESSENTIAL HYPERTENSION: Primary | ICD-10-CM

## 2024-06-25 DIAGNOSIS — R53.83 OTHER FATIGUE: ICD-10-CM

## 2024-06-25 DIAGNOSIS — F41.9 ANXIETY AND DEPRESSION: ICD-10-CM

## 2024-06-25 DIAGNOSIS — J06.9 VIRAL URI: ICD-10-CM

## 2024-06-25 DIAGNOSIS — F32.A ANXIETY AND DEPRESSION: ICD-10-CM

## 2024-06-25 DIAGNOSIS — Z00.00 ANNUAL PHYSICAL EXAM: ICD-10-CM

## 2024-06-25 DIAGNOSIS — E78.2 MIXED HYPERLIPIDEMIA: ICD-10-CM

## 2024-06-25 DIAGNOSIS — E66.01 CLASS 2 SEVERE OBESITY DUE TO EXCESS CALORIES WITH SERIOUS COMORBIDITY AND BODY MASS INDEX (BMI) OF 36.0 TO 36.9 IN ADULT (MULTI): ICD-10-CM

## 2024-06-25 PROBLEM — L98.9 SKIN LESION: Status: RESOLVED | Noted: 2023-09-30 | Resolved: 2024-06-25

## 2024-06-25 PROBLEM — I80.9 SUPERFICIAL THROMBOPHLEBITIS: Status: RESOLVED | Noted: 2023-09-30 | Resolved: 2024-06-25

## 2024-06-25 PROBLEM — F51.01 PRIMARY INSOMNIA: Status: ACTIVE | Noted: 2024-06-25

## 2024-06-25 PROBLEM — N39.0 RECURRENT UTI: Status: RESOLVED | Noted: 2023-09-30 | Resolved: 2024-06-25

## 2024-06-25 PROBLEM — K21.9 GASTROESOPHAGEAL REFLUX DISEASE WITHOUT ESOPHAGITIS: Status: RESOLVED | Noted: 2024-06-25 | Resolved: 2024-06-25

## 2024-06-25 PROBLEM — R61 NIGHT SWEATS: Status: RESOLVED | Noted: 2023-09-30 | Resolved: 2024-06-25

## 2024-06-25 PROBLEM — N45.2 ORCHITIS: Status: RESOLVED | Noted: 2023-09-30 | Resolved: 2024-06-25

## 2024-06-25 PROBLEM — R80.9 PROTEINURIA: Status: RESOLVED | Noted: 2023-09-30 | Resolved: 2024-06-25

## 2024-06-25 PROBLEM — K21.9 GASTROESOPHAGEAL REFLUX DISEASE WITHOUT ESOPHAGITIS: Status: ACTIVE | Noted: 2024-06-25

## 2024-06-25 PROBLEM — R00.2 PALPITATIONS: Status: RESOLVED | Noted: 2023-09-30 | Resolved: 2024-06-25

## 2024-06-25 PROBLEM — N50.819 PERSISTENT TESTICULAR PAIN: Status: RESOLVED | Noted: 2023-09-30 | Resolved: 2024-06-25

## 2024-06-25 PROBLEM — E87.6 HYPOKALEMIA: Status: RESOLVED | Noted: 2023-09-30 | Resolved: 2024-06-25

## 2024-06-25 PROBLEM — G89.18 POSTOPERATIVE PAIN: Status: RESOLVED | Noted: 2023-09-30 | Resolved: 2024-06-25

## 2024-06-25 PROBLEM — H60.90 OTITIS EXTERNA: Status: RESOLVED | Noted: 2023-09-30 | Resolved: 2024-06-25

## 2024-06-25 PROBLEM — F41.0 PANIC ATTACK: Status: RESOLVED | Noted: 2023-09-30 | Resolved: 2024-06-25

## 2024-06-25 PROBLEM — R00.0 TACHYCARDIA: Status: RESOLVED | Noted: 2023-09-30 | Resolved: 2024-06-25

## 2024-06-25 PROCEDURE — 3008F BODY MASS INDEX DOCD: CPT | Performed by: INTERNAL MEDICINE

## 2024-06-25 PROCEDURE — 3079F DIAST BP 80-89 MM HG: CPT | Performed by: INTERNAL MEDICINE

## 2024-06-25 PROCEDURE — 99213 OFFICE O/P EST LOW 20 MIN: CPT | Performed by: INTERNAL MEDICINE

## 2024-06-25 PROCEDURE — 1036F TOBACCO NON-USER: CPT | Performed by: INTERNAL MEDICINE

## 2024-06-25 PROCEDURE — 3074F SYST BP LT 130 MM HG: CPT | Performed by: INTERNAL MEDICINE

## 2024-06-25 PROCEDURE — 99395 PREV VISIT EST AGE 18-39: CPT | Performed by: INTERNAL MEDICINE

## 2024-06-25 RX ORDER — DULOXETIN HYDROCHLORIDE 60 MG/1
60 CAPSULE, DELAYED RELEASE ORAL DAILY
Qty: 30 CAPSULE | Refills: 5 | Status: SHIPPED | OUTPATIENT
Start: 2024-06-25 | End: 2024-12-22

## 2024-06-25 RX ORDER — LISINOPRIL 40 MG/1
40 TABLET ORAL DAILY
Qty: 90 TABLET | Refills: 1 | Status: SHIPPED | OUTPATIENT
Start: 2024-06-25 | End: 2024-12-22

## 2024-06-25 RX ORDER — SEMAGLUTIDE 0.25 MG/.5ML
0.25 INJECTION, SOLUTION SUBCUTANEOUS
Qty: 2 ML | Refills: 0 | Status: SHIPPED | OUTPATIENT
Start: 2024-06-30 | End: 2024-07-26

## 2024-06-25 ASSESSMENT — PATIENT HEALTH QUESTIONNAIRE - PHQ9
2. FEELING DOWN, DEPRESSED OR HOPELESS: NOT AT ALL
1. LITTLE INTEREST OR PLEASURE IN DOING THINGS: NOT AT ALL
SUM OF ALL RESPONSES TO PHQ9 QUESTIONS 1 AND 2: 0

## 2024-06-25 ASSESSMENT — COLUMBIA-SUICIDE SEVERITY RATING SCALE - C-SSRS
2. HAVE YOU ACTUALLY HAD ANY THOUGHTS OF KILLING YOURSELF?: NO
6. HAVE YOU EVER DONE ANYTHING, STARTED TO DO ANYTHING, OR PREPARED TO DO ANYTHING TO END YOUR LIFE?: NO
1. IN THE PAST MONTH, HAVE YOU WISHED YOU WERE DEAD OR WISHED YOU COULD GO TO SLEEP AND NOT WAKE UP?: NO

## 2024-06-25 NOTE — PROGRESS NOTES
Subjective   Patient ID: Krishna Moreland is a 39 y.o. male who presents for Follow-up, Annual Exam, Sore Throat, and Obesity.    HPI   For the past two days having mild head congestion, sore throat, and headaches. +chills, No fevers. Niece and nephew sick last week and he was around them. No cough or dyspnea. +fatigued.    Had two more foot/ankle surgeries earlier this year.  Seeing new Ortho Foot/Ankle.  Still dealing with a lot of foot pain. Hasnt been very active due to this. Gained a lot of weight.  Admits diet could be better  Has been more down, mainly due to his ankle pain and frustrated with this.  Using xanax once per day or so which helps his anxiety.    Bps have been higher at home, up to 140s/100. Higher with the weight gain.    Still not smoking  Had colonoscopy      Review of Systems    Objective   /88   Pulse 100   Temp 35.9 °C (96.6 °F)   Ht 1.829 m (6')   Wt 122 kg (270 lb)   SpO2 97%   BMI 36.62 kg/m²     Physical Exam  Constitutional:       Appearance: Normal appearance.   HENT:      Head:      Comments: No cervical lymphadenopathy     Right Ear: Tympanic membrane and ear canal normal.      Left Ear: Tympanic membrane and ear canal normal.      Nose: No congestion.      Mouth/Throat:      Mouth: Mucous membranes are moist.      Pharynx: Posterior oropharyngeal erythema present. No oropharyngeal exudate.   Eyes:      Extraocular Movements: Extraocular movements intact.      Pupils: Pupils are equal, round, and reactive to light.   Cardiovascular:      Rate and Rhythm: Normal rate and regular rhythm.      Heart sounds: No murmur heard.  Pulmonary:      Effort: Pulmonary effort is normal.      Breath sounds: Normal breath sounds.   Abdominal:      General: Bowel sounds are normal.      Palpations: Abdomen is soft.      Tenderness: There is no abdominal tenderness.   Musculoskeletal:      Cervical back: Neck supple.      Right lower leg: Edema present.      Left lower leg: No edema.       Comments: Decreased ROM of R ankle   Skin:     Findings: No lesion or rash.   Neurological:      Mental Status: He is alert.      Cranial Nerves: No cranial nerve deficit.      Motor: No weakness.      Gait: Gait normal.         Assessment/Plan   Problem List Items Addressed This Visit             ICD-10-CM    Anxiety and depression F41.9, F32.A    Relevant Medications    DULoxetine (Cymbalta) 60 mg DR capsule    Other Relevant Orders    Opiate/Opioid/Benzo Prescription Compliance    Hyperlipidemia E78.5    Essential hypertension - Primary I10    Relevant Medications    lisinopril 40 mg tablet    Obesity E66.9    Relevant Medications    semaglutide, weight loss, (Wegovy) 0.25 mg/0.5 mL pen injector (Start on 6/30/2024)    Annual physical exam Z00.00    Relevant Orders    CBC    Comprehensive Metabolic Panel    Lipid Panel    Hemoglobin A1C    Viral URI J06.9     Other Visit Diagnoses         Codes    Other fatigue     R53.83    Relevant Orders    Testosterone               Recurrent UTIs - Urology evaluation was unremarkable.     Anxiety and depression - Change sertraline to duloxetine. Continue seroquel. Continue buspar. Try to limit xanax.     GERD - Cont PPI. Had EGD 10/2019 with gastritis     HTN - high at home, increase lisinopril, get labs in 1-2 weeks     HLP - Cont current.     Insomnia  -Cont trazodone     IBS  -Monitor     Obesity  -Start wegovy  -Discussed healthy diet    Viral URI - symptomatic management     HM:  -labs ordered  -Colonoscopy 12/2023, due in 5 years     F/u 3 months

## 2024-07-06 DIAGNOSIS — F32.A ANXIETY AND DEPRESSION: ICD-10-CM

## 2024-07-06 DIAGNOSIS — F41.9 ANXIETY AND DEPRESSION: ICD-10-CM

## 2024-07-08 ENCOUNTER — PATIENT MESSAGE (OUTPATIENT)
Dept: PRIMARY CARE | Facility: CLINIC | Age: 39
End: 2024-07-08
Payer: COMMERCIAL

## 2024-07-08 DIAGNOSIS — F41.9 ANXIETY AND DEPRESSION: ICD-10-CM

## 2024-07-08 DIAGNOSIS — F32.A ANXIETY AND DEPRESSION: ICD-10-CM

## 2024-07-08 RX ORDER — ALPRAZOLAM 1 MG/1
TABLET ORAL
Qty: 30 TABLET | Refills: 0 | Status: SHIPPED | OUTPATIENT
Start: 2024-07-08

## 2024-07-08 RX ORDER — ALPRAZOLAM 1 MG/1
TABLET ORAL
Qty: 30 TABLET | Refills: 0 | Status: SHIPPED | OUTPATIENT
Start: 2024-07-08 | End: 2024-07-08 | Stop reason: SDUPTHER

## 2024-07-10 ENCOUNTER — HOSPITAL ENCOUNTER (OUTPATIENT)
Dept: RADIOLOGY | Facility: HOSPITAL | Age: 39
Discharge: HOME | End: 2024-07-10
Payer: COMMERCIAL

## 2024-07-10 DIAGNOSIS — T85.848A PAIN FROM IMPLANTED HARDWARE, INITIAL ENCOUNTER: ICD-10-CM

## 2024-07-10 PROCEDURE — 73700 CT LOWER EXTREMITY W/O DYE: CPT | Mod: RIGHT SIDE | Performed by: STUDENT IN AN ORGANIZED HEALTH CARE EDUCATION/TRAINING PROGRAM

## 2024-07-10 PROCEDURE — 73700 CT LOWER EXTREMITY W/O DYE: CPT | Mod: RT

## 2024-08-06 ENCOUNTER — TELEPHONE (OUTPATIENT)
Dept: PRIMARY CARE | Facility: CLINIC | Age: 39
End: 2024-08-06
Payer: COMMERCIAL

## 2024-08-10 DIAGNOSIS — K21.9 GASTROESOPHAGEAL REFLUX DISEASE WITHOUT ESOPHAGITIS: ICD-10-CM

## 2024-08-12 DIAGNOSIS — K21.9 GASTROESOPHAGEAL REFLUX DISEASE WITHOUT ESOPHAGITIS: ICD-10-CM

## 2024-08-12 RX ORDER — ESOMEPRAZOLE MAGNESIUM 40 MG/1
40 CAPSULE, DELAYED RELEASE ORAL DAILY
Qty: 90 CAPSULE | Refills: 3 | Status: SHIPPED | OUTPATIENT
Start: 2024-08-12 | End: 2024-08-12 | Stop reason: SDUPTHER

## 2024-08-12 RX ORDER — ESOMEPRAZOLE MAGNESIUM 40 MG/1
40 CAPSULE, DELAYED RELEASE ORAL DAILY
Qty: 90 CAPSULE | Refills: 3 | Status: SHIPPED | OUTPATIENT
Start: 2024-08-12

## 2024-08-16 ENCOUNTER — APPOINTMENT (OUTPATIENT)
Dept: PRIMARY CARE | Facility: CLINIC | Age: 39
End: 2024-08-16
Payer: COMMERCIAL

## 2024-09-13 ENCOUNTER — OFFICE VISIT (OUTPATIENT)
Dept: PAIN MEDICINE | Facility: CLINIC | Age: 39
End: 2024-09-13
Payer: COMMERCIAL

## 2024-09-13 VITALS
SYSTOLIC BLOOD PRESSURE: 128 MMHG | WEIGHT: 270 LBS | HEART RATE: 86 BPM | RESPIRATION RATE: 18 BRPM | BODY MASS INDEX: 36.57 KG/M2 | HEIGHT: 72 IN | DIASTOLIC BLOOD PRESSURE: 88 MMHG

## 2024-09-13 DIAGNOSIS — F32.A ANXIETY AND DEPRESSION: ICD-10-CM

## 2024-09-13 DIAGNOSIS — M25.571 CHRONIC PAIN OF RIGHT ANKLE: ICD-10-CM

## 2024-09-13 DIAGNOSIS — Z96.661 HISTORY OF RIGHT ANKLE JOINT REPLACEMENT: ICD-10-CM

## 2024-09-13 DIAGNOSIS — G89.29 CHRONIC PAIN OF RIGHT ANKLE: ICD-10-CM

## 2024-09-13 DIAGNOSIS — G90.521 COMPLEX REGIONAL PAIN SYNDROME TYPE 1 OF RIGHT LOWER EXTREMITY: ICD-10-CM

## 2024-09-13 DIAGNOSIS — Z01.818 PREOP TESTING: Primary | ICD-10-CM

## 2024-09-13 DIAGNOSIS — F41.9 ANXIETY AND DEPRESSION: ICD-10-CM

## 2024-09-13 PROCEDURE — 1036F TOBACCO NON-USER: CPT | Performed by: ANESTHESIOLOGY

## 2024-09-13 PROCEDURE — 99214 OFFICE O/P EST MOD 30 MIN: CPT | Performed by: ANESTHESIOLOGY

## 2024-09-13 PROCEDURE — 3074F SYST BP LT 130 MM HG: CPT | Performed by: ANESTHESIOLOGY

## 2024-09-13 PROCEDURE — 3079F DIAST BP 80-89 MM HG: CPT | Performed by: ANESTHESIOLOGY

## 2024-09-13 PROCEDURE — 99204 OFFICE O/P NEW MOD 45 MIN: CPT | Performed by: ANESTHESIOLOGY

## 2024-09-13 PROCEDURE — 3008F BODY MASS INDEX DOCD: CPT | Performed by: ANESTHESIOLOGY

## 2024-09-13 RX ORDER — DULOXETIN HYDROCHLORIDE 30 MG/1
90 CAPSULE, DELAYED RELEASE ORAL DAILY
Qty: 90 CAPSULE | Refills: 0 | Status: SHIPPED | OUTPATIENT
Start: 2024-09-13 | End: 2024-10-13

## 2024-09-13 ASSESSMENT — ENCOUNTER SYMPTOMS
DEPRESSION: 1
NUMBNESS: 1
GASTROINTESTINAL NEGATIVE: 1
OCCASIONAL FEELINGS OF UNSTEADINESS: 1
CONSTITUTIONAL NEGATIVE: 1
HEMATOLOGIC/LYMPHATIC NEGATIVE: 1
RESPIRATORY NEGATIVE: 1
JOINT SWELLING: 1
PSYCHIATRIC NEGATIVE: 1
CARDIOVASCULAR NEGATIVE: 1
ARTHRALGIAS: 1
WEAKNESS: 1
EYES NEGATIVE: 1
LOSS OF SENSATION IN FEET: 1
ENDOCRINE NEGATIVE: 1

## 2024-09-13 ASSESSMENT — PATIENT HEALTH QUESTIONNAIRE - PHQ9
7. TROUBLE CONCENTRATING ON THINGS, SUCH AS READING THE NEWSPAPER OR WATCHING TELEVISION: NEARLY EVERY DAY
SUM OF ALL RESPONSES TO PHQ QUESTIONS 1-9: 21
8. MOVING OR SPEAKING SO SLOWLY THAT OTHER PEOPLE COULD HAVE NOTICED. OR THE OPPOSITE, BEING SO FIGETY OR RESTLESS THAT YOU HAVE BEEN MOVING AROUND A LOT MORE THAN USUAL: NEARLY EVERY DAY
4. FEELING TIRED OR HAVING LITTLE ENERGY: NEARLY EVERY DAY
5. POOR APPETITE OR OVEREATING: NEARLY EVERY DAY
3. TROUBLE FALLING OR STAYING ASLEEP OR SLEEPING TOO MUCH: NEARLY EVERY DAY
6. FEELING BAD ABOUT YOURSELF - OR THAT YOU ARE A FAILURE OR HAVE LET YOURSELF OR YOUR FAMILY DOWN: NEARLY EVERY DAY
10. IF YOU CHECKED OFF ANY PROBLEMS, HOW DIFFICULT HAVE THESE PROBLEMS MADE IT FOR YOU TO DO YOUR WORK, TAKE CARE OF THINGS AT HOME, OR GET ALONG WITH OTHER PEOPLE: VERY DIFFICULT
1. LITTLE INTEREST OR PLEASURE IN DOING THINGS: SEVERAL DAYS
SUM OF ALL RESPONSES TO PHQ9 QUESTIONS 1 AND 2: 3
9. THOUGHTS THAT YOU WOULD BE BETTER OFF DEAD, OR OF HURTING YOURSELF: NOT AT ALL
2. FEELING DOWN, DEPRESSED OR HOPELESS: MORE THAN HALF THE DAYS

## 2024-09-13 ASSESSMENT — LIFESTYLE VARIABLES: TOTAL SCORE: 7

## 2024-09-13 ASSESSMENT — PAIN DESCRIPTION - DESCRIPTORS: DESCRIPTORS: BURNING

## 2024-09-13 ASSESSMENT — PAIN SCALES - GENERAL
PAINLEVEL_OUTOF10: 8
PAINLEVEL: 8

## 2024-09-13 ASSESSMENT — PAIN - FUNCTIONAL ASSESSMENT: PAIN_FUNCTIONAL_ASSESSMENT: 0-10

## 2024-09-13 NOTE — LETTER
September 13, 2024     Candace Chino DPM  [id number] [facility] [id number]    Patient: Krishna Moreland   YOB: 1985   Date of Visit: 9/13/2024       Dear Dr. Candace Chino DPM:    Thank you for referring Krishna Moreland to me for evaluation. Below are my notes for this consultation.  If you have questions, please do not hesitate to call me. I look forward to following your patient along with you.       Sincerely,     Roxy Arrieta MD      CC: No Recipients  ______________________________________________________________________________________    PAIN MANAGEMENT NEW PATIENT OFFICE NOTE    Date of Service: 9/13/2024    SUBJECTIVE    CHIEF COMPLAINT: R ankle pain.     HISTORY OF PRESENT ILLNESS    Guy Moreland is a 39 y.o. male with PMH HTN, GERD, obesity, IBS, MDD/CORKY, insomnia who presents as new patient referred by Dr Chino with R ankle pain.    Pt describes R ankle surgery since falling off roof 2006 s/p 8 surgeries including total ankle replacement 2022 s/p 3 revisions, last of which was 5/1/24. Since that time, pt describes R ankle pain that is worst in lateral aspect characterized as burning. Medial pain present, but less severe. Notes dx'd tarsal tunnel syndrome as well going down medial leg to heel. Pain assoc with numbness, itching, weakness, swelling, red/dusky. Pain inhibits walking, ADLs, sleep, impairing QoL. Pain has been refractive to Tylenol, NSAIDs, duloxetine, >6 w PT, marijuana, gabapentin, m relaxants, CSI. On Seroquel for insomnia    Pt denies new-onset bowel/bladder incontinence.  Pt denies recent infection, allergy to Latex/iodine/contrast. Patient is currently taking the following blood thinner(s): N/A    REVIEW OF SYSTEMS  Review of Systems   Constitutional: Negative.    HENT: Negative.     Eyes: Negative.    Respiratory: Negative.     Cardiovascular: Negative.    Gastrointestinal: Negative.    Endocrine: Negative.    Musculoskeletal:  Positive for  arthralgias and joint swelling.   Skin: Negative.    Neurological:  Positive for weakness and numbness.   Hematological: Negative.    Psychiatric/Behavioral: Negative.         PAST MEDICAL HISTORY  Past Medical History:   Diagnosis Date   • Acute prostatitis 03/03/2019    Acute prostatitis with hematuria   • Allergy status to unspecified drugs, medicaments and biological substances 06/08/2017    History of adverse drug reaction   • Anxiety    • COVID-19 01/04/2022    COVID-19   • Cutaneous abscess of buttock 06/08/2017    Abscess of buttock   • Cutaneous abscess of buttock 04/28/2017    Abscess, gluteal, left   • Cutaneous abscess of left lower limb 08/11/2015    Abscess of knee, left   • Cutaneous abscess, unspecified 10/22/2015    Abscess   • Disease of intestine, unspecified     Intestinal disorder   • Dizziness and giddiness 03/03/2019    Dizzy spells   • Encounter for screening for infections with a predominantly sexual mode of transmission 03/03/2015    Screen for STD (sexually transmitted disease)   • Fever, unspecified 01/25/2017    Fever in adult   • GERD (gastroesophageal reflux disease)    • Hyperlipidemia    • Hypertension    • Left lower quadrant pain 06/14/2018    LLQ discomfort   • Other acute postprocedural pain 10/16/2020    Post-operative pain   • Other injury of unspecified body region, initial encounter 12/12/2016    Puncture wound   • Other intra-abdominal and pelvic swelling, mass and lump 07/13/2020    Nodule of groin   • Other intra-abdominal and pelvic swelling, mass and lump 10/08/2020    Inguinal mass   • Other intra-abdominal and pelvic swelling, mass and lump 10/22/2020    Mass of inguinal region   • Pain in right ankle and joints of right foot 06/03/2016    Chronic pain of right ankle   • Pain in right lower leg 03/20/2020    Right calf pain   • Personal history of diseases of the blood and blood-forming organs and certain disorders involving the immune mechanism 06/08/2020    History  of leukocytosis   • Personal history of nicotine dependence 04/05/2016    History of nicotine dependence   • Personal history of nicotine dependence 09/06/2017    History of nicotine dependence   • Personal history of nicotine dependence 02/01/2016    History of nicotine dependence   • Personal history of other (healed) physical injury and trauma 06/25/2017    History of sprain   • Personal history of other diseases of male genital organs 08/05/2019    History of epididymitis   • Personal history of other diseases of male genital organs 04/19/2019    History of epididymitis   • Personal history of other diseases of the musculoskeletal system and connective tissue 06/03/2016    History of low back pain   • Personal history of other diseases of the respiratory system 12/11/2015    History of acute sinusitis   • Personal history of other diseases of the respiratory system 02/05/2018    History of acute sinusitis   • Personal history of other diseases of the respiratory system 08/05/2019    History of acute sinusitis   • Personal history of other endocrine, nutritional and metabolic disease 03/03/2015    History of obesity   • Personal history of other infectious and parasitic diseases 01/14/2019    History of viral gastroenteritis   • Personal history of other specified conditions 07/26/2017    History of postnasal drip   • Personal history of urinary (tract) infections 08/06/2014    Personal history of urinary tract infection   • Pneumonia, unspecified organism 06/28/2017    CAP (community acquired pneumonia)   • Sialolithiasis     Salivary calculus   • Spinal headache    • Unspecified hearing loss, left ear 02/05/2018    Hearing loss of left ear   • Unspecified injury of right wrist, hand and finger(s), initial encounter 06/25/2017    Thumb injury, right, initial encounter   • Unspecified nonsuppurative otitis media, right ear 02/05/2018    Right otitis media with effusion   • Unspecified nonsuppurative otitis media,  unspecified ear 09/19/2018    Otitis media with effusion   • Unspecified otitis externa, unspecified ear 12/11/2015    Otitis externa   • Urinary tract infection, site not specified 01/31/2017    Acute UTI (urinary tract infection)   • Urinary tract infection, site not specified 03/05/2019    Acute UTI     Past Surgical History:   Procedure Laterality Date   • ABDOMINAL SURGERY     • ANKLE SURGERY  01/04/2023    Ankle Surgery   • COLONOSCOPY  06/05/2017    Colonoscopy (Fiberoptic)   • HERNIA REPAIR  09/21/2020    Hernia Repair   • OTHER SURGICAL HISTORY  06/05/2017    Incision And Drainage Of Skin Abscess Buttocks     Family History   Problem Relation Name Age of Onset   • Scleroderma Mother     • Hyperlipidemia Father     • Other (colon polyps, IBS, stomach polyps) Father     • Raynaud syndrome Brother     • Skin cancer Father's Brother     • Parkinsonism Paternal Grandfather     • Skin cancer Paternal Grandfather     • Diabetes Other GrandFather        CURRENT MEDICATIONS  Current Outpatient Medications   Medication Sig Dispense Refill   • ALPRAZolam (Xanax) 1 mg tablet TAKE ONE TABLET BY MOUTH TWO TIMES A DAY AS NEEDED FOR ANXIETY FOR UP TO 15 DAYS 30 tablet 0   • atorvastatin (Lipitor) 40 mg tablet TAKE ONE TABLET BY MOUTH EVERY DAY 90 tablet 3   • busPIRone (Buspar) 15 mg tablet TAKE ONE TABLET BY MOUTH TWO TIMES A  tablet 0   • DULoxetine (Cymbalta) 60 mg DR capsule Take 1 capsule (60 mg) by mouth once daily. Do not crush or chew. 30 capsule 5   • esomeprazole (NexIUM) 40 mg DR capsule Take 1 capsule (40 mg) by mouth once daily. 90 capsule 3   • lisinopril 40 mg tablet Take 1 tablet (40 mg) by mouth once daily. 90 tablet 1   • QUEtiapine (SEROquel) 100 mg tablet TAKE ONE TABLET BY MOUTH AT BEDTIME 90 tablet 0   • semaglutide, weight loss, (Wegovy) 0.25 mg/0.5 mL pen injector Inject 0.25 mg under the skin 1 (one) time per week for 4 doses. 2 mL 0   • triamcinolone (Kenalog) 0.1 % cream APPLY TO AFFECTED  AREA 2 TO 3 TIMES DAILY       No current facility-administered medications for this visit.       ALLERGIES AND DRUG REACTIONS  Allergies   Allergen Reactions   • Sulfa (Sulfonamide Antibiotics) Unknown and Other     TOLD AS A CHILD NOT TO TAKE          OBJECTIVE  Visit Vitals  /88   Pulse 86   Resp 18   Ht 1.829 m (6')   Wt 122 kg (270 lb)   BMI 36.62 kg/m²   Smoking Status Former   BSA 2.49 m²       Last Recorded Pain Score (if available):                Physical Exam  General: Sitting in chair, NAD  Head: NCAT  Eyes: Sclera/conjunctiva clear, EOMI, PERRL  Nose/mouth: MMM  CV: Good distal pulses  Lungs: Good/equal chest excursion  Abdomen: Soft, ND  Ext: No cyanosis/edema  MSK: RLE: red/dusky dorsal foot compared to L assoc with moderate non-pitting edema and surgical changes. Ankle circumferentially TTP, dorsiflexion structurally limited. Allodynia medial ankle.    Neuro: AAOx3, CN grossly nl  Dermatome sensation to light touch  LEFT L1 (lower pelvis/upper thigh): WNL    RIGHT L1: WNL      LEFT L2 (upper thigh): WNL       RIGHT: L2:WNL      LEFT L3 (medial knee): WNL       RIGHT L3: WNL      LEFT L4 (superior medial malleolus): WNL       RIGHT L4: WNL      LEFT L5 (dorsal foot): WNL       RIGHT L5: WNL      LEFT S1 (lateral foot): WNL     RIGHT S1: WNL      LEFT S2 (popliteal fossa): WNL    RIGHT S2: WNL        Motor strength  LEFT L2 (hip flexion): 5/5   RIGHT L2: 5/5  LEFT L3 (knee extension): 5/5     RIGHT L3: 5/5  LEFT L4 (dorsiflexion): 5/5     RIGHT L4: 5/5  LEFT L5 (EHL extension): 5/5     RIGHT L5: 5/5  LEFT S1 (plantarflexion): 5/5     RIGHT S1: 5/5  LEFT S2 (knee flexion): 5/5      RIGHT S2: 5/5    Special testing  DTR unremarkable  Seated slump test neg BL  Clonus: neg BL  Babinski: neg BL    Psych: affect nl  Skin: no rash/lesions      REVIEW OF LABORATORY DATA  I have reviewed the following lab results:  WBC   Date Value Ref Range Status   01/04/2023 8.9 4.4 - 11.3 x10E9/L Final     RBC   Date  Value Ref Range Status   01/04/2023 4.99 4.50 - 5.90 x10E12/L Final     Hemoglobin   Date Value Ref Range Status   01/04/2023 14.4 13.5 - 17.5 g/dL Final     Hematocrit   Date Value Ref Range Status   01/04/2023 43.3 41.0 - 52.0 % Final     MCV   Date Value Ref Range Status   01/04/2023 87 80 - 100 fL Final     MCH   Date Value Ref Range Status   10/07/2022 29.9 26 - 34 PG Final     MCHC   Date Value Ref Range Status   01/04/2023 33.3 32.0 - 36.0 g/dL Final     RDW   Date Value Ref Range Status   01/04/2023 13.2 11.5 - 14.5 % Final     Platelets   Date Value Ref Range Status   01/04/2023 350 150 - 450 x10E9/L Final     MPV   Date Value Ref Range Status   10/07/2022 9.2 7.0 - 12.6 CU Final     Sodium   Date Value Ref Range Status   01/04/2023 138 136 - 145 mmol/L Final     Potassium   Date Value Ref Range Status   01/04/2023 4.2 3.5 - 5.3 mmol/L Final     Bicarbonate   Date Value Ref Range Status   01/04/2023 28 21 - 32 mmol/L Final     Urea Nitrogen   Date Value Ref Range Status   01/04/2023 13 6 - 23 mg/dL Final     Calcium   Date Value Ref Range Status   01/04/2023 10.4 8.6 - 10.6 mg/dL Final     Protime   Date Value Ref Range Status   06/26/2021 12.0 10.1 - 13.3 sec Final     INR   Date Value Ref Range Status   06/26/2021 1.0 0.9 - 1.1 Final         REVIEW OF RADIOLOGY   I have reviewed the following:  Radiology Studies           CT ankle R 7/10/24:  No acute fracture or dislocation. Postsurgical changes total ankle  arthroplasty. Hardware appears intact, well positioned and well  aligned. There is no periprosthetic fracture or lucency. Solid fusion  across subtalar joint with ghost tracts related to prior arthrodesis.  Unchanged bone-on-bone articulation along the inferior aspect of the  calcaneocuboid joint. Mid and hindfoot joints are otherwise  maintained. No sizeable joint effusion.      SOFT TISSUES:  Muscle bulk is maintained. No subcutaneous fluid collection.      IMPRESSION:  Unremarkable total ankle  arthroplasty.       ASSESSMENT & PLAN  Guy Moreland is a 39 y.o. old male with PMH HTN, GERD, obesity, IBS, MDD/CORKY, insomnia who presents as new patient referred by Dr Chino with R ankle pain likely 2/2 CRPS    1) RLE CRPS 1  -Chronic R ankle pain since fall off roof 2006 s/p 8 surgeries including total ankle replacement 2022 s/p 3 revisions, last of which was 5/1/24.   -Persistent R ankle pain with sympathetic signs/sx meeting [place] criteria for CRPS 1. Pain severe and inhibiting function, ADLs, sleep; impairing QoL  -Refractive to >10 y conservative tx including Tylenol, NSAIDs, duloxetine, >6 w PT, marijuana, gabapentin, m relaxants, CSI.  -Reviewed/discussed CT R ankle 7/10/24: Unremarkable total ankle arthroplasty.  -DRG work-up:  --Obtain EMG from Dr Cee  --Psych eval  --CT T/L-spine  -In [facility] duloxetine to 90 mg every day since this has helped the most  -F/U 4-6 w                 Roxy Arrieta MD  Anesthesiologist & Interventional Pain Physician   [facility] [place]  O: [phone]  F: [phone]  8:59 AM  09/13/24

## 2024-09-13 NOTE — PROGRESS NOTES
Diet and exercise  Weigh tloss   PAIN MANAGEMENT NEW PATIENT OFFICE NOTE    Date of Service: 9/13/2024    SUBJECTIVE    CHIEF COMPLAINT: R ankle pain.     HISTORY OF PRESENT ILLNESS    Guy Moreland is a 39 y.o. male with PMH HTN, GERD, obesity, IBS, MDD/CORKY, insomnia who presents as new patient referred by Dr Chino with R ankle pain.    Pt describes R ankle surgery since falling off roof 2006 s/p 8 surgeries including total ankle replacement 2022 s/p 3 revisions, last of which was 5/1/24. Since that time, pt describes R ankle pain that is worst in lateral aspect characterized as burning. Medial pain present, but less severe. Notes dx'd tarsal tunnel syndrome as well going down medial leg to heel. Pain assoc with numbness, itching, weakness, swelling, red/dusky. Pain inhibits walking, ADLs, sleep, impairing QoL. Pain has been refractive to Tylenol, NSAIDs, duloxetine, >6 w PT, marijuana, gabapentin, m relaxants, CSI. On Seroquel for insomnia    Pt denies new-onset bowel/bladder incontinence.  Pt denies recent infection, allergy to Latex/iodine/contrast. Patient is currently taking the following blood thinner(s): N/A    REVIEW OF SYSTEMS  Review of Systems   Constitutional: Negative.    HENT: Negative.     Eyes: Negative.    Respiratory: Negative.     Cardiovascular: Negative.    Gastrointestinal: Negative.    Endocrine: Negative.    Musculoskeletal:  Positive for arthralgias and joint swelling.   Skin: Negative.    Neurological:  Positive for weakness and numbness.   Hematological: Negative.    Psychiatric/Behavioral: Negative.         PAST MEDICAL HISTORY  Past Medical History:   Diagnosis Date    Acute prostatitis 03/03/2019    Acute prostatitis with hematuria    Allergy status to unspecified drugs, medicaments and biological substances 06/08/2017    History of adverse drug reaction    Anxiety     COVID-19 01/04/2022    COVID-19    Cutaneous abscess of buttock 06/08/2017    Abscess of buttock    Cutaneous abscess of buttock 04/28/2017     Abscess, gluteal, left    Cutaneous abscess of left lower limb 08/11/2015    Abscess of knee, left    Cutaneous abscess, unspecified 10/22/2015    Abscess    Disease of intestine, unspecified     Intestinal disorder    Dizziness and giddiness 03/03/2019    Dizzy spells    Encounter for screening for infections with a predominantly sexual mode of transmission 03/03/2015    Screen for STD (sexually transmitted disease)    Fever, unspecified 01/25/2017    Fever in adult    GERD (gastroesophageal reflux disease)     Hyperlipidemia     Hypertension     Left lower quadrant pain 06/14/2018    LLQ discomfort    Other acute postprocedural pain 10/16/2020    Post-operative pain    Other injury of unspecified body region, initial encounter 12/12/2016    Puncture wound    Other intra-abdominal and pelvic swelling, mass and lump 07/13/2020    Nodule of groin    Other intra-abdominal and pelvic swelling, mass and lump 10/08/2020    Inguinal mass    Other intra-abdominal and pelvic swelling, mass and lump 10/22/2020    Mass of inguinal region    Pain in right ankle and joints of right foot 06/03/2016    Chronic pain of right ankle    Pain in right lower leg 03/20/2020    Right calf pain    Personal history of diseases of the blood and blood-forming organs and certain disorders involving the immune mechanism 06/08/2020    History of leukocytosis    Personal history of nicotine dependence 04/05/2016    History of nicotine dependence    Personal history of nicotine dependence 09/06/2017    History of nicotine dependence    Personal history of nicotine dependence 02/01/2016    History of nicotine dependence    Personal history of other (healed) physical injury and trauma 06/25/2017    History of sprain    Personal history of other diseases of male genital organs 08/05/2019    History of epididymitis    Personal history of other diseases of male genital organs 04/19/2019    History of epididymitis    Personal history of other diseases  of the musculoskeletal system and connective tissue 06/03/2016    History of low back pain    Personal history of other diseases of the respiratory system 12/11/2015    History of acute sinusitis    Personal history of other diseases of the respiratory system 02/05/2018    History of acute sinusitis    Personal history of other diseases of the respiratory system 08/05/2019    History of acute sinusitis    Personal history of other endocrine, nutritional and metabolic disease 03/03/2015    History of obesity    Personal history of other infectious and parasitic diseases 01/14/2019    History of viral gastroenteritis    Personal history of other specified conditions 07/26/2017    History of postnasal drip    Personal history of urinary (tract) infections 08/06/2014    Personal history of urinary tract infection    Pneumonia, unspecified organism 06/28/2017    CAP (community acquired pneumonia)    Sialolithiasis     Salivary calculus    Spinal headache     Unspecified hearing loss, left ear 02/05/2018    Hearing loss of left ear    Unspecified injury of right wrist, hand and finger(s), initial encounter 06/25/2017    Thumb injury, right, initial encounter    Unspecified nonsuppurative otitis media, right ear 02/05/2018    Right otitis media with effusion    Unspecified nonsuppurative otitis media, unspecified ear 09/19/2018    Otitis media with effusion    Unspecified otitis externa, unspecified ear 12/11/2015    Otitis externa    Urinary tract infection, site not specified 01/31/2017    Acute UTI (urinary tract infection)    Urinary tract infection, site not specified 03/05/2019    Acute UTI     Past Surgical History:   Procedure Laterality Date    ABDOMINAL SURGERY      ANKLE SURGERY  01/04/2023    Ankle Surgery    COLONOSCOPY  06/05/2017    Colonoscopy (Fiberoptic)    HERNIA REPAIR  09/21/2020    Hernia Repair    OTHER SURGICAL HISTORY  06/05/2017    Incision And Drainage Of Skin Abscess Buttocks     Family History    Problem Relation Name Age of Onset    Scleroderma Mother      Hyperlipidemia Father      Other (colon polyps, IBS, stomach polyps) Father      Raynaud syndrome Brother      Skin cancer Father's Brother      Parkinsonism Paternal Grandfather      Skin cancer Paternal Grandfather      Diabetes Other GrandFather        CURRENT MEDICATIONS  Current Outpatient Medications   Medication Sig Dispense Refill    ALPRAZolam (Xanax) 1 mg tablet TAKE ONE TABLET BY MOUTH TWO TIMES A DAY AS NEEDED FOR ANXIETY FOR UP TO 15 DAYS 30 tablet 0    atorvastatin (Lipitor) 40 mg tablet TAKE ONE TABLET BY MOUTH EVERY DAY 90 tablet 3    busPIRone (Buspar) 15 mg tablet TAKE ONE TABLET BY MOUTH TWO TIMES A  tablet 0    DULoxetine (Cymbalta) 60 mg DR capsule Take 1 capsule (60 mg) by mouth once daily. Do not crush or chew. 30 capsule 5    esomeprazole (NexIUM) 40 mg DR capsule Take 1 capsule (40 mg) by mouth once daily. 90 capsule 3    lisinopril 40 mg tablet Take 1 tablet (40 mg) by mouth once daily. 90 tablet 1    QUEtiapine (SEROquel) 100 mg tablet TAKE ONE TABLET BY MOUTH AT BEDTIME 90 tablet 0    semaglutide, weight loss, (Wegovy) 0.25 mg/0.5 mL pen injector Inject 0.25 mg under the skin 1 (one) time per week for 4 doses. 2 mL 0    triamcinolone (Kenalog) 0.1 % cream APPLY TO AFFECTED AREA 2 TO 3 TIMES DAILY       No current facility-administered medications for this visit.       ALLERGIES AND DRUG REACTIONS  Allergies   Allergen Reactions    Sulfa (Sulfonamide Antibiotics) Unknown and Other     TOLD AS A CHILD NOT TO TAKE          OBJECTIVE  Visit Vitals  /88   Pulse 86   Resp 18   Ht 1.829 m (6')   Wt 122 kg (270 lb)   BMI 36.62 kg/m²   Smoking Status Former   BSA 2.49 m²       Last Recorded Pain Score (if available):                Physical Exam  General: Sitting in chair, NAD  Head: NCAT  Eyes: Sclera/conjunctiva clear, EOMI, PERRL  Nose/mouth: MMM  CV: Good distal pulses  Lungs: Good/equal chest excursion  Abdomen:  Soft, ND  Ext: No cyanosis/edema  MSK: RLE: red/dusky dorsal foot compared to L assoc with moderate non-pitting edema and surgical changes. Ankle circumferentially TTP, dorsiflexion structurally limited. Allodynia medial ankle.    Neuro: AAOx3, CN grossly nl  Dermatome sensation to light touch  LEFT L1 (lower pelvis/upper thigh): WNL    RIGHT L1: WNL      LEFT L2 (upper thigh): WNL       RIGHT: L2:WNL      LEFT L3 (medial knee): WNL       RIGHT L3: WNL      LEFT L4 (superior medial malleolus): WNL       RIGHT L4: WNL      LEFT L5 (dorsal foot): WNL       RIGHT L5: WNL      LEFT S1 (lateral foot): WNL     RIGHT S1: WNL      LEFT S2 (popliteal fossa): WNL    RIGHT S2: WNL        Motor strength  LEFT L2 (hip flexion): 5/5   RIGHT L2: 5/5  LEFT L3 (knee extension): 5/5     RIGHT L3: 5/5  LEFT L4 (dorsiflexion): 5/5     RIGHT L4: 5/5  LEFT L5 (EHL extension): 5/5     RIGHT L5: 5/5  LEFT S1 (plantarflexion): 5/5     RIGHT S1: 5/5  LEFT S2 (knee flexion): 5/5      RIGHT S2: 5/5    Special testing  DTR unremarkable  Seated slump test neg BL  Clonus: neg BL  Babinski: neg BL    Psych: affect nl  Skin: no rash/lesions      REVIEW OF LABORATORY DATA  I have reviewed the following lab results:  WBC   Date Value Ref Range Status   01/04/2023 8.9 4.4 - 11.3 x10E9/L Final     RBC   Date Value Ref Range Status   01/04/2023 4.99 4.50 - 5.90 x10E12/L Final     Hemoglobin   Date Value Ref Range Status   01/04/2023 14.4 13.5 - 17.5 g/dL Final     Hematocrit   Date Value Ref Range Status   01/04/2023 43.3 41.0 - 52.0 % Final     MCV   Date Value Ref Range Status   01/04/2023 87 80 - 100 fL Final     MCH   Date Value Ref Range Status   10/07/2022 29.9 26 - 34 PG Final     MCHC   Date Value Ref Range Status   01/04/2023 33.3 32.0 - 36.0 g/dL Final     RDW   Date Value Ref Range Status   01/04/2023 13.2 11.5 - 14.5 % Final     Platelets   Date Value Ref Range Status   01/04/2023 350 150 - 450 x10E9/L Final     MPV   Date Value Ref Range  Status   10/07/2022 9.2 7.0 - 12.6 CU Final     Sodium   Date Value Ref Range Status   01/04/2023 138 136 - 145 mmol/L Final     Potassium   Date Value Ref Range Status   01/04/2023 4.2 3.5 - 5.3 mmol/L Final     Bicarbonate   Date Value Ref Range Status   01/04/2023 28 21 - 32 mmol/L Final     Urea Nitrogen   Date Value Ref Range Status   01/04/2023 13 6 - 23 mg/dL Final     Calcium   Date Value Ref Range Status   01/04/2023 10.4 8.6 - 10.6 mg/dL Final     Protime   Date Value Ref Range Status   06/26/2021 12.0 10.1 - 13.3 sec Final     INR   Date Value Ref Range Status   06/26/2021 1.0 0.9 - 1.1 Final         REVIEW OF RADIOLOGY   I have reviewed the following:  Radiology Studies           CT ankle R 7/10/24:  No acute fracture or dislocation. Postsurgical changes total ankle  arthroplasty. Hardware appears intact, well positioned and well  aligned. There is no periprosthetic fracture or lucency. Solid fusion  across subtalar joint with ghost tracts related to prior arthrodesis.  Unchanged bone-on-bone articulation along the inferior aspect of the  calcaneocuboid joint. Mid and hindfoot joints are otherwise  maintained. No sizeable joint effusion.      SOFT TISSUES:  Muscle bulk is maintained. No subcutaneous fluid collection.      IMPRESSION:  Unremarkable total ankle arthroplasty.       ASSESSMENT & PLAN  Guy Moreland is a 39 y.o. old male with PMH HTN, GERD, obesity, IBS, MDD/CORKY, insomnia who presents as new patient referred by Dr Chino with R ankle pain likely 2/2 CRPS    1) RLE CRPS 1  -Chronic R ankle pain since fall off roof 2006 s/p 8 surgeries including total ankle replacement 2022 s/p 3 revisions, last of which was 5/1/24.   -Persistent R ankle pain with sympathetic signs/sx meeting [place] criteria for CRPS 1. Pain severe and inhibiting function, ADLs, sleep; impairing QoL  -Refractive to >10 y conservative tx including Tylenol, NSAIDs, duloxetine, >6 w PT, marijuana, gabapentin, m relaxants,  CSI.  -Reviewed/discussed CT R ankle 7/10/24: Unremarkable total ankle arthroplasty.  -DRG work-up:  --Obtain EMG from Dr Cee  --Psych eval  --CT T/L-spine  -In meantime, inc duloxetine to 90 mg every day since this has helped the most  -F/U 4-6 w                 Roxy Arrieta MD  Anesthesiologist & Interventional Pain Physician   [facility]  O: [phone]  F: [phone]  8:59 AM  09/13/24

## 2024-09-16 RX ORDER — QUETIAPINE FUMARATE 100 MG/1
100 TABLET, FILM COATED ORAL NIGHTLY
Qty: 90 TABLET | Refills: 1 | Status: SHIPPED | OUTPATIENT
Start: 2024-09-16

## 2024-09-17 ENCOUNTER — TELEPHONE (OUTPATIENT)
Dept: PAIN MEDICINE | Facility: CLINIC | Age: 39
End: 2024-09-17
Payer: COMMERCIAL

## 2024-09-25 NOTE — TELEPHONE ENCOUNTER
Spoke with staff at dr wiley office. Provided fax number for them to fax EMG report.  (Dr wiley # [phone])

## 2024-10-07 ENCOUNTER — HOSPITAL ENCOUNTER (OUTPATIENT)
Dept: RADIOLOGY | Facility: HOSPITAL | Age: 39
Discharge: HOME | End: 2024-10-07
Payer: COMMERCIAL

## 2024-10-07 DIAGNOSIS — Z01.818 PREOP TESTING: ICD-10-CM

## 2024-10-07 PROCEDURE — 72128 CT CHEST SPINE W/O DYE: CPT

## 2024-10-07 PROCEDURE — 72131 CT LUMBAR SPINE W/O DYE: CPT

## 2024-10-08 ENCOUNTER — APPOINTMENT (OUTPATIENT)
Dept: RADIOLOGY | Facility: HOSPITAL | Age: 39
End: 2024-10-08
Payer: COMMERCIAL

## 2024-10-11 DIAGNOSIS — G90.521 COMPLEX REGIONAL PAIN SYNDROME TYPE 1 OF RIGHT LOWER EXTREMITY: ICD-10-CM

## 2024-10-14 RX ORDER — DULOXETIN HYDROCHLORIDE 30 MG/1
CAPSULE, DELAYED RELEASE ORAL
Qty: 90 CAPSULE | Refills: 2 | Status: SHIPPED | OUTPATIENT
Start: 2024-10-14

## 2024-10-16 ENCOUNTER — OFFICE VISIT (OUTPATIENT)
Dept: PAIN MEDICINE | Facility: CLINIC | Age: 39
End: 2024-10-16
Payer: COMMERCIAL

## 2024-10-16 VITALS
OXYGEN SATURATION: 96 % | HEART RATE: 101 BPM | SYSTOLIC BLOOD PRESSURE: 111 MMHG | WEIGHT: 255 LBS | HEIGHT: 72 IN | BODY MASS INDEX: 34.54 KG/M2 | DIASTOLIC BLOOD PRESSURE: 75 MMHG | RESPIRATION RATE: 20 BRPM

## 2024-10-16 DIAGNOSIS — M25.561 CHRONIC PAIN OF RIGHT KNEE: Primary | ICD-10-CM

## 2024-10-16 DIAGNOSIS — G57.71 COMPLEX REGIONAL PAIN SYNDROME TYPE 2 OF RIGHT LOWER EXTREMITY: ICD-10-CM

## 2024-10-16 DIAGNOSIS — G89.29 CHRONIC PAIN OF RIGHT KNEE: Primary | ICD-10-CM

## 2024-10-16 PROCEDURE — 3078F DIAST BP <80 MM HG: CPT | Performed by: ANESTHESIOLOGY

## 2024-10-16 PROCEDURE — 99214 OFFICE O/P EST MOD 30 MIN: CPT | Performed by: ANESTHESIOLOGY

## 2024-10-16 PROCEDURE — 3008F BODY MASS INDEX DOCD: CPT | Performed by: ANESTHESIOLOGY

## 2024-10-16 PROCEDURE — 3074F SYST BP LT 130 MM HG: CPT | Performed by: ANESTHESIOLOGY

## 2024-10-16 ASSESSMENT — ENCOUNTER SYMPTOMS
PSYCHIATRIC NEGATIVE: 1
CARDIOVASCULAR NEGATIVE: 1
EYES NEGATIVE: 1
RESPIRATORY NEGATIVE: 1
GASTROINTESTINAL NEGATIVE: 1
JOINT SWELLING: 1
ENDOCRINE NEGATIVE: 1
NUMBNESS: 1
WEAKNESS: 1
CONSTITUTIONAL NEGATIVE: 1
ARTHRALGIAS: 1
HEMATOLOGIC/LYMPHATIC NEGATIVE: 1

## 2024-10-16 ASSESSMENT — PAIN SCALES - GENERAL
PAINLEVEL_OUTOF10: 5
PAINLEVEL_OUTOF10: 5 - MODERATE PAIN

## 2024-10-16 ASSESSMENT — PAIN DESCRIPTION - DESCRIPTORS: DESCRIPTORS: BURNING;SHARP;STABBING

## 2024-10-16 ASSESSMENT — PAIN - FUNCTIONAL ASSESSMENT: PAIN_FUNCTIONAL_ASSESSMENT: 0-10

## 2024-10-16 NOTE — PROGRESS NOTES
PAIN MANAGEMENT FOLLOW-UP OFFICE NOTE    Date of Service: 10/16/2024    SUBJECTIVE    CHIEF COMPLAINT: R ankle pain.     HISTORY OF PRESENT ILLNESS    Guy Moreland is a 39 y.o. male pt of Dr Chino with PMH HTN, GERD, obesity, IBS, MDD/CORKY, insomnia who presents for F/U     Since his last visit, pt started duloxetine with improved pain control. Denies SE. Completed CT T/L-spine and would like to discuss. S/f psych eval 10/20. Also reviews R knee pain 2-3 y attributed to gait change assoc with R ankle pain. Pt has tried Tylenol, NSAIDs, duloxetine without sustained relief.     Pt denies new-onset bowel/bladder incontinence.  Pt denies recent infection, allergy to Latex/iodine/contrast. Patient is currently taking the following blood thinner(s): N/A    REVIEW OF SYSTEMS  Review of Systems   Constitutional: Negative.    HENT: Negative.     Eyes: Negative.    Respiratory: Negative.     Cardiovascular: Negative.    Gastrointestinal: Negative.    Endocrine: Negative.    Musculoskeletal:  Positive for arthralgias and joint swelling.   Skin: Negative.    Neurological:  Positive for weakness and numbness.   Hematological: Negative.    Psychiatric/Behavioral: Negative.         PAST MEDICAL HISTORY  Past Medical History:   Diagnosis Date    Acute prostatitis 03/03/2019    Acute prostatitis with hematuria    Allergy status to unspecified drugs, medicaments and biological substances 06/08/2017    History of adverse drug reaction    Anxiety     COVID-19 01/04/2022    COVID-19    Cutaneous abscess of buttock 06/08/2017    Abscess of buttock    Cutaneous abscess of buttock 04/28/2017    Abscess, gluteal, left    Cutaneous abscess of left lower limb 08/11/2015    Abscess of knee, left    Cutaneous abscess, unspecified 10/22/2015    Abscess    Disease of intestine, unspecified     Intestinal disorder    Dizziness and giddiness 03/03/2019    Dizzy spells    Encounter for screening for infections with a predominantly sexual mode of  transmission 03/03/2015    Screen for STD (sexually transmitted disease)    Fever, unspecified 01/25/2017    Fever in adult    GERD (gastroesophageal reflux disease)     Hyperlipidemia     Hypertension     Left lower quadrant pain 06/14/2018    LLQ discomfort    Other acute postprocedural pain 10/16/2020    Post-operative pain    Other injury of unspecified body region, initial encounter 12/12/2016    Puncture wound    Other intra-abdominal and pelvic swelling, mass and lump 07/13/2020    Nodule of groin    Other intra-abdominal and pelvic swelling, mass and lump 10/08/2020    Inguinal mass    Other intra-abdominal and pelvic swelling, mass and lump 10/22/2020    Mass of inguinal region    Pain in right ankle and joints of right foot 06/03/2016    Chronic pain of right ankle    Pain in right lower leg 03/20/2020    Right calf pain    Personal history of diseases of the blood and blood-forming organs and certain disorders involving the immune mechanism 06/08/2020    History of leukocytosis    Personal history of nicotine dependence 04/05/2016    History of nicotine dependence    Personal history of nicotine dependence 09/06/2017    History of nicotine dependence    Personal history of nicotine dependence 02/01/2016    History of nicotine dependence    Personal history of other (healed) physical injury and trauma 06/25/2017    History of sprain    Personal history of other diseases of male genital organs 08/05/2019    History of epididymitis    Personal history of other diseases of male genital organs 04/19/2019    History of epididymitis    Personal history of other diseases of the musculoskeletal system and connective tissue 06/03/2016    History of low back pain    Personal history of other diseases of the respiratory system 12/11/2015    History of acute sinusitis    Personal history of other diseases of the respiratory system 02/05/2018    History of acute sinusitis    Personal history of other diseases of the  respiratory system 08/05/2019    History of acute sinusitis    Personal history of other endocrine, nutritional and metabolic disease 03/03/2015    History of obesity    Personal history of other infectious and parasitic diseases 01/14/2019    History of viral gastroenteritis    Personal history of other specified conditions 07/26/2017    History of postnasal drip    Personal history of urinary (tract) infections 08/06/2014    Personal history of urinary tract infection    Pneumonia, unspecified organism 06/28/2017    CAP (community acquired pneumonia)    Sialolithiasis     Salivary calculus    Spinal headache     Unspecified hearing loss, left ear 02/05/2018    Hearing loss of left ear    Unspecified injury of right wrist, hand and finger(s), initial encounter 06/25/2017    Thumb injury, right, initial encounter    Unspecified nonsuppurative otitis media, right ear 02/05/2018    Right otitis media with effusion    Unspecified nonsuppurative otitis media, unspecified ear 09/19/2018    Otitis media with effusion    Unspecified otitis externa, unspecified ear 12/11/2015    Otitis externa    Urinary tract infection, site not specified 01/31/2017    Acute UTI (urinary tract infection)    Urinary tract infection, site not specified 03/05/2019    Acute UTI     Past Surgical History:   Procedure Laterality Date    ABDOMINAL SURGERY      ANKLE SURGERY  01/04/2023    Ankle Surgery    COLONOSCOPY  06/05/2017    Colonoscopy (Fiberoptic)    HERNIA REPAIR  09/21/2020    Hernia Repair    OTHER SURGICAL HISTORY  06/05/2017    Incision And Drainage Of Skin Abscess Buttocks     Family History   Problem Relation Name Age of Onset    Scleroderma Mother      Hyperlipidemia Father      Other (colon polyps, IBS, stomach polyps) Father      Raynaud syndrome Brother      Skin cancer Father's Brother      Parkinsonism Paternal Grandfather      Skin cancer Paternal Grandfather      Diabetes Other GrandFather        CURRENT  MEDICATIONS  Current Outpatient Medications   Medication Sig Dispense Refill    atorvastatin (Lipitor) 40 mg tablet TAKE ONE TABLET BY MOUTH EVERY DAY 90 tablet 3    busPIRone (Buspar) 15 mg tablet TAKE ONE TABLET BY MOUTH TWO TIMES A  tablet 0    DULoxetine (Cymbalta) 30 mg DR capsule TAKE THREE (3) CAPSULES BY MOUTH ONCE DAILY. DO NOT CRUSH OR CHEW. 90 capsule 2    esomeprazole (NexIUM) 40 mg DR capsule Take 1 capsule (40 mg) by mouth once daily. 90 capsule 3    lisinopril 40 mg tablet Take 1 tablet (40 mg) by mouth once daily. 90 tablet 1    QUEtiapine (SEROquel) 100 mg tablet TAKE ONE TABLET BY MOUTH AT BEDTIME 90 tablet 1    ALPRAZolam (Xanax) 1 mg tablet TAKE ONE TABLET BY MOUTH TWO TIMES A DAY AS NEEDED FOR ANXIETY FOR UP TO 15 DAYS (Patient not taking: Reported on 10/16/2024) 30 tablet 0     No current facility-administered medications for this visit.       ALLERGIES AND DRUG REACTIONS  Allergies   Allergen Reactions    Sulfa (Sulfonamide Antibiotics) Unknown and Other     TOLD AS A CHILD NOT TO TAKE          OBJECTIVE  Visit Vitals  /75   Pulse 101   Resp 20   Ht 1.829 m (6')   Wt 116 kg (255 lb)   SpO2 96%   BMI 34.58 kg/m²   Smoking Status Former   BSA 2.43 m²       Last Recorded Pain Score (if available):                Physical Exam  General: Sitting in chair, NAD  Head: NCAT  Eyes: Sclera/conjunctiva clear, EOMI, PERRL  Nose/mouth: MMM  CV: Good distal pulses  Lungs: Good/equal chest excursion  Abdomen: Soft, ND  Ext: No cyanosis/edema  MSK: RLE: red/dusky dorsal foot compared to L assoc with moderate non-pitting edema and surgical changes. Ankle circumferentially TTP, dorsiflexion structurally limited. Allodynia medial ankle.  R knee: no erythema, mild swelling. Full ext, pain on full flexion. No laxity, neg anterior/posterior drawer    Neuro: AAOx3, CN grossly nl  Dermatome sensation to light touch  LEFT L1 (lower pelvis/upper thigh): WNL    RIGHT L1: WNL      LEFT L2 (upper thigh): WNL        RIGHT: L2:WNL      LEFT L3 (medial knee): WNL       RIGHT L3: WNL      LEFT L4 (superior medial malleolus): WNL       RIGHT L4: WNL      LEFT L5 (dorsal foot): WNL       RIGHT L5: WNL      LEFT S1 (lateral foot): WNL     RIGHT S1: WNL      LEFT S2 (popliteal fossa): WNL    RIGHT S2: WNL        Motor strength  LEFT L2 (hip flexion): 5/5   RIGHT L2: 5/5  LEFT L3 (knee extension): 5/5     RIGHT L3: 5/5  LEFT L4 (dorsiflexion): 5/5     RIGHT L4: 5/5  LEFT L5 (EHL extension): 5/5     RIGHT L5: 5/5  LEFT S1 (plantarflexion): 5/5     RIGHT S1: 5/5  LEFT S2 (knee flexion): 5/5      RIGHT S2: 5/5        Psych: affect nl  Skin: no rash/lesions      REVIEW OF LABORATORY DATA  I have reviewed the following lab results:  WBC   Date Value Ref Range Status   01/04/2023 8.9 4.4 - 11.3 x10E9/L Final     RBC   Date Value Ref Range Status   01/04/2023 4.99 4.50 - 5.90 x10E12/L Final     Hemoglobin   Date Value Ref Range Status   01/04/2023 14.4 13.5 - 17.5 g/dL Final     Hematocrit   Date Value Ref Range Status   01/04/2023 43.3 41.0 - 52.0 % Final     MCV   Date Value Ref Range Status   01/04/2023 87 80 - 100 fL Final     MCH   Date Value Ref Range Status   10/07/2022 29.9 26 - 34 PG Final     MCHC   Date Value Ref Range Status   01/04/2023 33.3 32.0 - 36.0 g/dL Final     RDW   Date Value Ref Range Status   01/04/2023 13.2 11.5 - 14.5 % Final     Platelets   Date Value Ref Range Status   01/04/2023 350 150 - 450 x10E9/L Final     MPV   Date Value Ref Range Status   10/07/2022 9.2 7.0 - 12.6 CU Final     Sodium   Date Value Ref Range Status   01/04/2023 138 136 - 145 mmol/L Final     Potassium   Date Value Ref Range Status   01/04/2023 4.2 3.5 - 5.3 mmol/L Final     Bicarbonate   Date Value Ref Range Status   01/04/2023 28 21 - 32 mmol/L Final     Urea Nitrogen   Date Value Ref Range Status   01/04/2023 13 6 - 23 mg/dL Final     Calcium   Date Value Ref Range Status   01/04/2023 10.4 8.6 - 10.6 mg/dL Final     Protime   Date  Value Ref Range Status   06/26/2021 12.0 10.1 - 13.3 sec Final     INR   Date Value Ref Range Status   06/26/2021 1.0 0.9 - 1.1 Final         REVIEW OF RADIOLOGY   I have reviewed the following:  Radiology Studies           CT ankle R 7/10/24:  No acute fracture or dislocation. Postsurgical changes total ankle  arthroplasty. Hardware appears intact, well positioned and well  aligned. There is no periprosthetic fracture or lucency. Solid fusion  across subtalar joint with ghost tracts related to prior arthrodesis.  Unchanged bone-on-bone articulation along the inferior aspect of the  calcaneocuboid joint. Mid and hindfoot joints are otherwise  maintained. No sizeable joint effusion.      SOFT TISSUES:  Muscle bulk is maintained. No subcutaneous fluid collection.      IMPRESSION:  Unremarkable total ankle arthroplasty.       ASSESSMENT & PLAN  Guy Moreland is a 39 y.o. male pt of Dr Chino with PMH HTN, GERD, obesity, IBS, MDD/CORKY, insomnia who presents for F/U R ankle pain likely 2/2 CRPS    1) RLE CRPS 2  -Chronic R ankle pain since fall off roof 2006 s/p 8 surgeries including total ankle replacement 2022 s/p 3 revisions, last of which was 5/1/24.   -Persistent R ankle pain with sympathetic signs/sx meeting [place] criteria for CRPS. Pain severe and inhibiting function, ADLs, sleep; impairing QoL  -Refractive to >10 y conservative tx including Tylenol, NSAIDs, duloxetine, >6 w PT, marijuana, gabapentin, m relaxants, CSI.  -CT R ankle 7/10/24: Unremarkable total ankle arthroplasty.  -DRG work-up:  --Reviewed/discussed EMG BLE 1/10/2020: mild R L5, S1 radiculopathy; mild BL S1 conduction defect; R sural sensory nerve injury  --Psych eval scheduled 10/20  --Reviewed/discussed CT T/L-spine 10/7/24: unremarkable  -Cont duloxetine 90 mg QD    2) R knee pain  -Since 2021 attributed to gait change from R ankle CRPS   -Refractive to yrs of conservative tx including Tylenol, NSAIDs, duloxetine -XR R knee  -Discussed  utility of CSI, but pt declined  -F/U 4-6 w                 Roxy Arrieta MD  Anesthesiologist & Interventional Pain Physician   [facility] [place]  O: [phone]  F: [phone]  4:12 PM  10/16/24

## 2024-10-18 PROBLEM — G57.71 COMPLEX REGIONAL PAIN SYNDROME TYPE 2 OF RIGHT LOWER EXTREMITY: Status: ACTIVE | Noted: 2024-10-18

## 2024-10-18 PROBLEM — M25.561 CHRONIC PAIN OF RIGHT KNEE: Status: ACTIVE | Noted: 2024-10-18

## 2024-10-18 PROBLEM — G89.29 CHRONIC PAIN OF RIGHT KNEE: Status: ACTIVE | Noted: 2024-10-18

## 2024-12-11 ENCOUNTER — PREP FOR PROCEDURE (OUTPATIENT)
Dept: PAIN MEDICINE | Facility: CLINIC | Age: 39
End: 2024-12-11
Payer: COMMERCIAL

## 2024-12-11 DIAGNOSIS — G57.71 COMPLEX REGIONAL PAIN SYNDROME TYPE 2 OF RIGHT LOWER EXTREMITY: Primary | ICD-10-CM

## 2024-12-11 RX ORDER — CEFAZOLIN SODIUM 2 G/100ML
2 INJECTION, SOLUTION INTRAVENOUS ONCE
OUTPATIENT
Start: 2024-12-11 | End: 2024-12-11

## 2024-12-13 ENCOUNTER — PRE-ADMISSION TESTING (OUTPATIENT)
Dept: PREADMISSION TESTING | Facility: HOSPITAL | Age: 39
End: 2024-12-13
Payer: COMMERCIAL

## 2024-12-13 VITALS
WEIGHT: 255 LBS | DIASTOLIC BLOOD PRESSURE: 72 MMHG | RESPIRATION RATE: 16 BRPM | BODY MASS INDEX: 34.54 KG/M2 | TEMPERATURE: 97.9 F | OXYGEN SATURATION: 99 % | HEIGHT: 72 IN | HEART RATE: 97 BPM | SYSTOLIC BLOOD PRESSURE: 111 MMHG

## 2024-12-13 DIAGNOSIS — Z01.818 PREOP TESTING: Primary | ICD-10-CM

## 2024-12-13 LAB
ANION GAP SERPL CALCULATED.3IONS-SCNC: 11 MMOL/L (ref 10–20)
BASOPHILS # BLD AUTO: 0.05 X10*3/UL (ref 0–0.1)
BASOPHILS NFR BLD AUTO: 0.6 %
BUN SERPL-MCNC: 11 MG/DL (ref 6–23)
CALCIUM SERPL-MCNC: 10.3 MG/DL (ref 8.6–10.3)
CHLORIDE SERPL-SCNC: 103 MMOL/L (ref 98–107)
CO2 SERPL-SCNC: 29 MMOL/L (ref 21–32)
CREAT SERPL-MCNC: 0.97 MG/DL (ref 0.5–1.3)
EGFRCR SERPLBLD CKD-EPI 2021: >90 ML/MIN/1.73M*2
EOSINOPHIL # BLD AUTO: 0.14 X10*3/UL (ref 0–0.7)
EOSINOPHIL NFR BLD AUTO: 1.8 %
ERYTHROCYTE [DISTWIDTH] IN BLOOD BY AUTOMATED COUNT: 13.2 % (ref 11.5–14.5)
GLUCOSE SERPL-MCNC: 85 MG/DL (ref 74–99)
HCT VFR BLD AUTO: 45.8 % (ref 41–52)
HGB BLD-MCNC: 15.2 G/DL (ref 13.5–17.5)
IMM GRANULOCYTES # BLD AUTO: 0.03 X10*3/UL (ref 0–0.7)
IMM GRANULOCYTES NFR BLD AUTO: 0.4 % (ref 0–0.9)
LYMPHOCYTES # BLD AUTO: 2.33 X10*3/UL (ref 1.2–4.8)
LYMPHOCYTES NFR BLD AUTO: 29.4 %
MCH RBC QN AUTO: 29 PG (ref 26–34)
MCHC RBC AUTO-ENTMCNC: 33.2 G/DL (ref 32–36)
MCV RBC AUTO: 87 FL (ref 80–100)
MONOCYTES # BLD AUTO: 0.76 X10*3/UL (ref 0.1–1)
MONOCYTES NFR BLD AUTO: 9.6 %
NEUTROPHILS # BLD AUTO: 4.61 X10*3/UL (ref 1.2–7.7)
NEUTROPHILS NFR BLD AUTO: 58.2 %
NRBC BLD-RTO: 0 /100 WBCS (ref 0–0)
PLATELET # BLD AUTO: 394 X10*3/UL (ref 150–450)
POTASSIUM SERPL-SCNC: 4.2 MMOL/L (ref 3.5–5.3)
RBC # BLD AUTO: 5.25 X10*6/UL (ref 4.5–5.9)
SODIUM SERPL-SCNC: 139 MMOL/L (ref 136–145)
WBC # BLD AUTO: 7.9 X10*3/UL (ref 4.4–11.3)

## 2024-12-13 PROCEDURE — 87081 CULTURE SCREEN ONLY: CPT | Mod: TRILAB

## 2024-12-13 PROCEDURE — 85025 COMPLETE CBC W/AUTO DIFF WBC: CPT

## 2024-12-13 PROCEDURE — 80048 BASIC METABOLIC PNL TOTAL CA: CPT

## 2024-12-13 PROCEDURE — 36415 COLL VENOUS BLD VENIPUNCTURE: CPT

## 2024-12-13 PROCEDURE — 99203 OFFICE O/P NEW LOW 30 MIN: CPT | Performed by: NURSE PRACTITIONER

## 2024-12-13 RX ORDER — CHLORHEXIDINE GLUCONATE ORAL RINSE 1.2 MG/ML
SOLUTION DENTAL
Qty: 473 ML | Refills: 0 | Status: SHIPPED | OUTPATIENT
Start: 2024-12-13 | End: 2024-12-17

## 2024-12-13 ASSESSMENT — DUKE ACTIVITY SCORE INDEX (DASI)
CAN YOU DO MODERATE WORK AROUND THE HOUSE LIKE VACUUMING, SWEEPING FLOORS OR CARRYING GROCERIES: YES
CAN YOU DO LIGHT WORK AROUND THE HOUSE LIKE DUSTING OR WASHING DISHES: YES
CAN YOU PARTICIPATE IN MODERATE RECREATIONAL ACTIVITIES LIKE GOLF, BOWLING, DANCING, DOUBLES TENNIS OR THROWING A BASEBALL OR FOOTBALL: NO
CAN YOU TAKE CARE OF YOURSELF (EAT, DRESS, BATHE, OR USE TOILET): YES
CAN YOU WALK INDOORS, SUCH AS AROUND YOUR HOUSE: YES
CAN YOU DO HEAVY WORK AROUND THE HOUSE LIKE SCRUBBING FLOORS OR LIFTING AND MOVING HEAVY FURNITURE: NO
CAN YOU DO YARD WORK LIKE RAKING LEAVES, WEEDING OR PUSHING A MOWER: NO
TOTAL_SCORE: 24.2
CAN YOU HAVE SEXUAL RELATIONS: YES
CAN YOU WALK A BLOCK OR TWO ON LEVEL GROUND: YES
CAN YOU PARTICIPATE IN STRENOUS SPORTS LIKE SWIMMING, SINGLES TENNIS, FOOTBALL, BASKETBALL, OR SKIING: NO
CAN YOU RUN A SHORT DISTANCE: NO
DASI METS SCORE: 5.7
CAN YOU CLIMB A FLIGHT OF STAIRS OR WALK UP A HILL: YES

## 2024-12-13 ASSESSMENT — ENCOUNTER SYMPTOMS
RESPIRATORY NEGATIVE: 1
ENDOCRINE NEGATIVE: 1
HEMATOLOGIC/LYMPHATIC NEGATIVE: 1
CARDIOVASCULAR NEGATIVE: 1
PSYCHIATRIC NEGATIVE: 1
NEUROLOGICAL NEGATIVE: 1
EYES NEGATIVE: 1
GASTROINTESTINAL NEGATIVE: 1
ACTIVITY CHANGE: 1

## 2024-12-13 NOTE — PREPROCEDURE INSTRUCTIONS
Why must I stop eating and drinking near surgery time?  With sedation, food or liquid in your stomach can enter your lungs causing serious complications  Increases nausea and vomiting    When do I need to stop eating and drinking before my surgery?   Do not eat or drink after midnight the night before your surgery/procedure.  You may have small sips of water to take your medication.      PAT DISCHARGE INSTRUCTIONS    Please call the Same Day Surgery (SDS) Department of the hospital where your procedure will be performed after 2:00 PM the day before your surgery. If you are scheduled on a Monday, or a Tuesday following a Monday holiday, you will need to call on the last business day prior to your surgery.    [place] [id number] [place] [id number] [phone]  Second Floor      Please let your surgeon know if:      You develop any open sores, shingles, burning or painful urination as these may increase your risk of an infection.   You no longer wish to have the surgery.   Any other personal circumstances change that may lead to the need to cancel or defer this surgery-such as being sick or getting admitted to any hospital within one week of your planned procedure.    Your contact details change, such as a change of address or phone number.    Starting now:     Please DO NOT drink alcohol or smoke for 24 hours before surgery. It is well known that quitting smoking can make a huge difference to your health and recovery from surgery. The longer you abstain from smoking, the better your chances of a healthy recovery. If you need help with quitting, call 6-800-QUIT-NOW to be connected to a trained counselor who will discuss the best methods to help you quit.     Before your surgery:    Please stop all supplements 7 days prior to surgery. Or as directed by your surgeon.   Please stop taking NSAID pain medicine such as Advil and Motrin 7 days before surgery.    If you  develop any fever, cough, cold, rashes, cuts, scratches, scrapes, urinary symptoms or infection anywhere on your body (including teeth and gums) prior to surgery, please call your surgeon’s office as soon as possible. This may require treatment to reduce the chance of cancellation on the day of surgery.    The day before your surgery:   DIET- Please follow the diet instructions at the top of your packet.   Get a good night’s rest.  Use the special soap for bathing if you have been instructed to use one.    Scheduled surgery times may change and you will be notified if this occurs - please check your personal voicemail for any updates.     On the morning of surgery:   Wear comfortable, loose fitting clothes which open in the front. Please do not wear moisturizers, creams, lotions, makeup or perfume.    Please bring with you to surgery:   Photo ID and insurance card   Current list of medicines and allergies   Pacemaker/ Defibrillator/Heart stent cards   CPAP machine and mask    Slings/ splints/ crutches   A copy of your complete advanced directive/DHPOA.    Please do NOT bring with you to surgery:   All jewelry and valuables should be left at home.   Prosthetic devices such as contact lenses, hearing aids, dentures, eyelash extensions, hairpins and body piercings must be removed prior to going in to the surgical suite.    After outpatient surgery:   A responsible adult MUST accompany you at the time of discharge and stay with you for 24 hours after your surgery. You may NOT drive yourself home after surgery.    Do not drive, operate machinery, make critical decisions or do activities that require co-ordination or balance until after a night’s sleep.   Do not drink alcoholic beverages for 24 hours.   Instructions for resuming your medications will be provided by your surgeon.    CALL YOUR DOCTOR AFTER SURGERY IF YOU HAVE:     Chills and/or a fever of 101° F or higher.    Redness, swelling, pus or drainage from your  surgical wound or a bad smell from the wound.    Lightheadedness, fainting or confusion.    Persistent vomiting (throwing up) and are not able to eat or drink for 12 hours.    Three or more loose, watery bowel movements in 24 hours (diarrhea).   Difficulty or pain while urinating( after non-urological surgery)    Pain and swelling in your legs, especially if it is only on one side.    Difficulty breathing or are breathing faster than normal.    Any new concerning symptoms.      Patient Information: Pre-Operative Infection Prevention Measures     Why did I have my nose, under my arms, and groin swabbed?  The purpose of the swab is to identify Staphylococcus aureus inside your nose or on your skin.  The swab was sent to the laboratory for culture.  A positive swab/culture for Staphylococcus aureus is called colonization or carriage.      What is Staphylococcus aureus?  Staphylococcus aureus, also known as “staph”, is a germ found on the skin or in the nose of healthy people.  Sometimes Staphylococcus aureus can get into the body and cause an infection.  This can be minor (such as pimples, boils, or other skin problems).  It might also be serious (such as a blood infection, pneumonia, or a surgical site infection).    What is Staphylococcus aureus colonization or carriage?  Colonization or carriage means that a person has the germ but is not sick from it.  These bacteria can be spread on the hands or when breathing or sneezing.    How is Staphylococcus aureus spread?  It is most often spread by close contact with a person or item that carries it.    What happens if my culture is positive for Staphylococcus aureus?  Your doctor/medical team will use this information to guide any antibiotic treatment which may be necessary.  Regardless of the culture results, we will clean the inside of your nose with a betadine swab just before you have your surgery.      Will I get an infection if I have Staphylococcus aureus in my  nose or on my skin?  Anyone can get an infection with Staphylococcus aureus.  However, the best way to reduce your risk of infection is to follow the instructions provided to you for the use of your CHG soap and dental rinse.        Patient Information: Oral/Dental Rinse    What is oral/dental rinse?   It is a mouthwash. It is a way of cleaning the mouth with a germ-killing solution before your surgery.  The solution contains chlorhexidine, commonly known as CHG.   It is used inside the mouth to kill a bacteria known as Staphylococcus aureus.  Let your doctor know if you are allergic to Chlorhexidine.    Why do I need to use CHG oral/dental rinse?  The CHG oral/dental rinse helps to kill a bacteria in your mouth known as Staphylococcus aureus.     This reduces the risk of infection at the surgical site.      Using your CHG oral/dental rinse  STEPS:  Use your CHG oral/dental rinse after you brush your teeth the night before (at bedtime) and the morning of your surgery.  Follow all directions on your prescription label.    Use the cap on the container to measure 15ml   Swish (gargle if you can) the mouthwash in your mouth for at least 30 seconds, (do not swallow) and spit out  After you use your CHG rinse, do not rinse your mouth with water, drink or eat.  Please refer to the prescription label for the appropriate time to resume oral intake      What side effects might I have using the CHG oral/dental rinse?  CHG rinse will stick to plaque on the teeth.  Brush and floss just before use.  Teeth brushing will help avoid staining of plaque during use.      Patient Information: Home Preoperative Antibacterial Shower      What is a home preoperative antibacterial shower?  This shower is a way of cleaning the skin with a germ-killing solution before surgery.  The solution contains chlorhexidine, commonly known as CHG.  CHG is a skin cleanser with germ-killing ability.  Let your doctor know if you are allergic to  chlorhexidine.    Why do I need to take a preoperative antibacterial shower?  Skin is not sterile.  It is best to try to make your skin as free of germs as possible before surgery.  Proper cleansing with a germ-killing soap before surgery can lower the number of germs on your skin.  This helps to reduce the risk of infection at the surgical site.  Following the instructions listed below will help you prepare your skin for surgery.      How do I use the solution?  Steps:  Begin using your CHG soap 5 days before your scheduled surgery on ________________________.    First, wash and rinse your hair using the CHG soap. Keep CHG soap away from ear canals and eyes.  Rinse completely, do not condition.  Hair extensions should be removed.  Wash your face with your normal soap and rinse.    Apply the CHG solution to a clean wet washcloth.  Turn the water off or move away from the water spray to avoid premature rinsing of the CHG soap as you are applying.   Firmly lather your entire body from the neck down.  Do not use on your face.  Pay special attention to the area(s) where your incision(s) will be located unless they are on your face.  Avoid scrubbing your skin too hard.  The important point is to have the CHG soap sit on your skin for 3 minutes.    When the 3 minutes are up, turn on the water and rinse the CHG solution off your body completely.   DO NOT wash with regular soap after you have used the CHG soap solution  Pat yourself dry with a clean, freshly-laundered towel.  DO NOT apply powders, deodorants, or lotions.  Dress in clean, freshly laundered nightclothes.    Be sure to sleep with clean, freshly laundered sheets.  Be aware that CHG will cause stains on fabrics; if you wash them with bleach after use.  Rinse your washcloth and other linens that have contact with CHG completely.  Use only non-chlorine detergents to launder the items used.   The morning of surgery is the fifth day.  Repeat the above steps and  dress in clean comfortable clothing     Whom should I contact if I have any questions regarding the use of CHG soap?  Call the [facility] at [phone] if you have any questions.              Medication List            Accurate as of December 13, 2024 10:39 AM. Always use your most recent med list.                ALPRAZolam 1 mg tablet  Commonly known as: Xanax  TAKE ONE TABLET BY MOUTH TWO TIMES A DAY AS NEEDED FOR ANXIETY FOR UP TO 15 DAYS  Medication Adjustments for Surgery: Take/Use as prescribed     atorvastatin 40 mg tablet  Commonly known as: Lipitor  TAKE ONE TABLET BY MOUTH EVERY DAY  Medication Adjustments for Surgery: Take on the morning of surgery     busPIRone 15 mg tablet  Commonly known as: Buspar  TAKE ONE TABLET BY MOUTH TWO TIMES A DAY  Medication Adjustments for Surgery: Take on the morning of surgery     DULoxetine 30 mg DR capsule  Commonly known as: Cymbalta  TAKE THREE (3) CAPSULES BY MOUTH ONCE DAILY. DO NOT CRUSH OR CHEW.  Medication Adjustments for Surgery: Take on the morning of surgery     esomeprazole 40 mg DR capsule  Commonly known as: NexIUM  Take 1 capsule (40 mg) by mouth once daily.  Medication Adjustments for Surgery: Take on the morning of surgery     lisinopril 40 mg tablet  Take 1 tablet (40 mg) by mouth once daily.  Medication Adjustments for Surgery: Take last dose 1 day (24 hours) before surgery     OZEMPIC SUBQ  Additional Medication Adjustments for Surgery: Take last dose 7 days before surgery  Notes to patient: Hold 12/15/24 dose     QUEtiapine 100 mg tablet  Commonly known as: SEROquel  TAKE ONE TABLET BY MOUTH AT BEDTIME  Notes to patient: Take evening dose before surgery.

## 2024-12-13 NOTE — CPM/PAT H&P
"CPM/PAT Evaluation       Name: Guy Moreland (Guy Moreland \"Krishna\")  /Age: 1985/39 y.o.     In-Person       Chief Complaint: right ankle pain     HPI    Pt is a 39 year old male with complex regional pain syndrome of his right lower leg. Pt reports he has had a total of 8 right ankle surgeries. He now has constant right ankle burning pain. The pain radiates up the back of his right leg with walking. Pt has not found any medications or therapies that help reduce his pain. Pt was examined by his pain management physician and has been scheduled for insertion of spinal cord stimulator trial. Pt denies CP, SOB, or dizziness.     Past Medical History:   Diagnosis Date    Acute prostatitis 2019    Acute prostatitis with hematuria    Adverse effect of anesthesia     HX HA STATES MIGHT BE FROM PROPROFOL    Allergy status to unspecified drugs, medicaments and biological substances 2017    History of adverse drug reaction    Anxiety     Complex regional pain syndrome of right lower extremity     COVID-19 2022    COVID-19    Cutaneous abscess of left lower limb 2015    Abscess of knee, left    Depression     Dizziness and giddiness 2019    Dizzy spells    Eczema     Fatty liver     GERD (gastroesophageal reflux disease)     Hyperlipidemia     Hypertension     Left lower quadrant pain 2018    LLQ discomfort    Other intra-abdominal and pelvic swelling, mass and lump 2020    Nodule of groin    Other intra-abdominal and pelvic swelling, mass and lump 10/08/2020    Inguinal mass    Pain in right ankle and joints of right foot 2016    Chronic pain of right ankle    Pain in right lower leg 2020    Right calf pain    Panic attacks     Personal history of diseases of the blood and blood-forming organs and certain disorders involving the immune mechanism 2020    History of leukocytosis    Personal history of nicotine dependence 2016    History of nicotine " dependence    Personal history of other (healed) physical injury and trauma 2017    History of sprain    Personal history of other diseases of the musculoskeletal system and connective tissue 2016    History of low back pain    Personal history of other endocrine, nutritional and metabolic disease 2015    History of obesity    Personal history of urinary (tract) infections 2014    Personal history of urinary tract infection    Sialolithiasis     Salivary calculus    Unspecified hearing loss, left ear 2018    Hearing loss of left ear       Past Surgical History:   Procedure Laterality Date    ABDOMINAL SURGERY      ANKLE SURGERY  2023    Ankle Surgery; total of 8 right ankle surgeries    COLONOSCOPY  2017    Colonoscopy (Fiberoptic)    HERNIA REPAIR  2020    Hernia Repair    OTHER SURGICAL HISTORY  2017    Incision And Drainage Of Skin Abscess Buttocks    OTHER SURGICAL HISTORY      necrotic lipoma removed from abdomen    OTHER SURGICAL HISTORY      I&D of left Buttock Abscess (MRSA)    TOTAL ANKLE ARTHROPLASTY Right      Social History     Tobacco Use    Smoking status: Former     Current packs/day: 0.00     Average packs/day: 0.5 packs/day for 13.0 years (6.5 ttl pk-yrs)     Types: Cigarettes     Start date:      Quit date: 2018     Years since quittin.9    Smokeless tobacco: Never    Tobacco comments:     Quit 2019   Substance Use Topics    Alcohol use: Yes     Alcohol/week: 3.0 standard drinks of alcohol     Types: 3 Standard drinks or equivalent per week     Social History     Substance and Sexual Activity   Drug Use Yes    Types: Marijuana    Comment: Occassional marijuana use- LAST USED 24 TO STOP FOR SURGERY       Patient  has no history on file for sexual activity.    Family History   Problem Relation Name Age of Onset    Scleroderma Mother      Hyperlipidemia Father      Other (colon polyps, IBS, stomach polyps) Father      Raynaud syndrome  Brother      Skin cancer Father's Brother      Parkinsonism Paternal Grandfather      Skin cancer Paternal Grandfather      Diabetes Other GrandFather        Allergies   Allergen Reactions    Sulfa (Sulfonamide Antibiotics) Unknown and Other     TOLD AS A CHILD NOT TO TAKE     Current Outpatient Medications   Medication Sig Dispense Refill    ALPRAZolam (Xanax) 1 mg tablet TAKE ONE TABLET BY MOUTH TWO TIMES A DAY AS NEEDED FOR ANXIETY FOR UP TO 15 DAYS (Patient not taking: Reported on 10/16/2024) 30 tablet 0    atorvastatin (Lipitor) 40 mg tablet TAKE ONE TABLET BY MOUTH EVERY DAY 90 tablet 3    busPIRone (Buspar) 15 mg tablet TAKE ONE TABLET BY MOUTH TWO TIMES A  tablet 0    chlorhexidine (Peridex) 0.12 % solution Use as directed. 473 mL 0    DULoxetine (Cymbalta) 30 mg DR capsule TAKE THREE (3) CAPSULES BY MOUTH ONCE DAILY. DO NOT CRUSH OR CHEW. 90 capsule 2    esomeprazole (NexIUM) 40 mg DR capsule Take 1 capsule (40 mg) by mouth once daily. 90 capsule 3    lisinopril 40 mg tablet Take 1 tablet (40 mg) by mouth once daily. 90 tablet 1    QUEtiapine (SEROquel) 100 mg tablet TAKE ONE TABLET BY MOUTH AT BEDTIME 90 tablet 1    semaglutide (OZEMPIC SUBQ) Inject 2.4 mg under the skin 1 (one) time per week in the early morning.. Delivered in via and patient self administers 96 units=2.4mg  Sundays       No current facility-administered medications for this visit.     Review of Systems   Constitutional:  Positive for activity change.   HENT: Negative.     Eyes: Negative.    Respiratory: Negative.     Cardiovascular: Negative.    Gastrointestinal: Negative.    Endocrine: Negative.    Genitourinary: Negative.    Musculoskeletal:         Constant right ankle pain. I have had 8 past surgeries on my right ankle.    Skin: Negative.    Neurological: Negative.    Hematological: Negative.    Psychiatric/Behavioral: Negative.       Physical Exam  Vitals reviewed.   Constitutional:       Appearance: Normal appearance. He is  obese.   HENT:      Head: Normocephalic and atraumatic.      Nose: Nose normal.      Mouth/Throat:      Mouth: Mucous membranes are moist.      Pharynx: Oropharynx is clear.   Eyes:      Extraocular Movements: Extraocular movements intact.      Conjunctiva/sclera: Conjunctivae normal.      Pupils: Pupils are equal, round, and reactive to light.   Cardiovascular:      Rate and Rhythm: Normal rate and regular rhythm.      Pulses: Normal pulses.      Heart sounds: Normal heart sounds.   Pulmonary:      Effort: Pulmonary effort is normal.      Breath sounds: Normal breath sounds.   Abdominal:      General: Bowel sounds are normal.      Palpations: Abdomen is soft.   Musculoskeletal:         General: Normal range of motion.      Cervical back: Normal range of motion and neck supple.      Right lower leg: Edema (trace right ankle edema) present.      Comments: Mild discomfort with palpation of right ankle   Skin:     General: Skin is warm and dry.      Comments: Multiple surgical scars noted to right ankle   Neurological:      General: No focal deficit present.      Mental Status: He is alert and oriented to person, place, and time. Mental status is at baseline.   Psychiatric:         Mood and Affect: Mood normal.         Behavior: Behavior normal.         Thought Content: Thought content normal.         Judgment: Judgment normal.          PAT AIRWAY:   Airway:     Mallampati::  II    TM distance::  >3 FB    Neck ROM::  Full  normal      Visit Vitals  /72   Pulse 97   Temp 36.6 °C (97.9 °F) (Temporal)   Resp 16   Ht 1.829 m (6')   Wt 116 kg (255 lb)   SpO2 99%   BMI 34.58 kg/m²   Smoking Status Former   BSA 2.43 m²     ASA: 2  DASI: 24.2  METS: 5.7  CHADS: 2.8%  RCRI: 0.4%  STOP BAN  Ariscat: 1.6%    Assessment and Plan:     Complex regional pain syndrome type 2 of right lower extremity: Insertion Spinal Cord Stimulator - DRG   HTN: Pt is managed with lisinopril.   Hyperlipidemia: pt is taking atorvastatin    GERD: taking esomeprazole.   Anxiety/depression/Panic attacks: Taking Buspirone.    Insomnia: pt is on seroquel  BMI: 34.58; obesity to is on Semaglutide for weight loss.   Occasional marijuana use.     CBC and BMP collected in [facility]-C[facility]

## 2024-12-14 DIAGNOSIS — F41.9 ANXIETY AND DEPRESSION: ICD-10-CM

## 2024-12-14 DIAGNOSIS — F32.A ANXIETY AND DEPRESSION: ICD-10-CM

## 2024-12-15 LAB — STAPHYLOCOCCUS SPEC CULT: NORMAL

## 2024-12-16 DIAGNOSIS — F32.A ANXIETY AND DEPRESSION: ICD-10-CM

## 2024-12-16 DIAGNOSIS — F41.9 ANXIETY AND DEPRESSION: ICD-10-CM

## 2024-12-16 RX ORDER — BUSPIRONE HYDROCHLORIDE 15 MG/1
15 TABLET ORAL 2 TIMES DAILY
Qty: 180 TABLET | Refills: 3 | Status: SHIPPED | OUTPATIENT
Start: 2024-12-16

## 2024-12-16 RX ORDER — BUSPIRONE HYDROCHLORIDE 15 MG/1
15 TABLET ORAL 2 TIMES DAILY
Qty: 180 TABLET | Refills: 3 | Status: SHIPPED | OUTPATIENT
Start: 2024-12-16 | End: 2024-12-16 | Stop reason: SDUPTHER

## 2024-12-17 ENCOUNTER — ANESTHESIA EVENT (OUTPATIENT)
Dept: OPERATING ROOM | Facility: HOSPITAL | Age: 39
End: 2024-12-17
Payer: COMMERCIAL

## 2024-12-17 ENCOUNTER — HOSPITAL ENCOUNTER (OUTPATIENT)
Facility: HOSPITAL | Age: 39
Setting detail: OUTPATIENT SURGERY
Discharge: HOME | End: 2024-12-17
Attending: ANESTHESIOLOGY | Admitting: ANESTHESIOLOGY
Payer: COMMERCIAL

## 2024-12-17 ENCOUNTER — ANESTHESIA (OUTPATIENT)
Dept: OPERATING ROOM | Facility: HOSPITAL | Age: 39
End: 2024-12-17
Payer: COMMERCIAL

## 2024-12-17 ENCOUNTER — APPOINTMENT (OUTPATIENT)
Dept: RADIOLOGY | Facility: HOSPITAL | Age: 39
End: 2024-12-17
Payer: COMMERCIAL

## 2024-12-17 VITALS
SYSTOLIC BLOOD PRESSURE: 102 MMHG | HEART RATE: 82 BPM | DIASTOLIC BLOOD PRESSURE: 57 MMHG | TEMPERATURE: 97.3 F | HEIGHT: 72 IN | RESPIRATION RATE: 15 BRPM | WEIGHT: 255 LBS | OXYGEN SATURATION: 97 % | BODY MASS INDEX: 34.54 KG/M2

## 2024-12-17 DIAGNOSIS — G57.71 COMPLEX REGIONAL PAIN SYNDROME TYPE 2 OF RIGHT LOWER EXTREMITY: Primary | ICD-10-CM

## 2024-12-17 LAB — GLUCOSE BLD MANUAL STRIP-MCNC: 87 MG/DL (ref 74–99)

## 2024-12-17 PROCEDURE — 2500000004 HC RX 250 GENERAL PHARMACY W/ HCPCS (ALT 636 FOR OP/ED): Mod: JZ | Performed by: ANESTHESIOLOGY

## 2024-12-17 PROCEDURE — 3600000004 HC OR TIME - INITIAL BASE CHARGE - PROCEDURE LEVEL FOUR: Performed by: ANESTHESIOLOGY

## 2024-12-17 PROCEDURE — 7100000010 HC PHASE TWO TIME - EACH INCREMENTAL 1 MINUTE: Performed by: ANESTHESIOLOGY

## 2024-12-17 PROCEDURE — C1778 LEAD, NEUROSTIMULATOR: HCPCS | Performed by: ANESTHESIOLOGY

## 2024-12-17 PROCEDURE — 63650 IMPLANT NEUROELECTRODES: CPT | Performed by: ANESTHESIOLOGY

## 2024-12-17 PROCEDURE — 7100000001 HC RECOVERY ROOM TIME - INITIAL BASE CHARGE: Performed by: ANESTHESIOLOGY

## 2024-12-17 PROCEDURE — 2500000004 HC RX 250 GENERAL PHARMACY W/ HCPCS (ALT 636 FOR OP/ED): Performed by: ANESTHESIOLOGY

## 2024-12-17 PROCEDURE — 7100000002 HC RECOVERY ROOM TIME - EACH INCREMENTAL 1 MINUTE: Performed by: ANESTHESIOLOGY

## 2024-12-17 PROCEDURE — 82947 ASSAY GLUCOSE BLOOD QUANT: CPT

## 2024-12-17 PROCEDURE — 2780000003 HC OR 278 NO HCPCS: Performed by: ANESTHESIOLOGY

## 2024-12-17 PROCEDURE — 7100000009 HC PHASE TWO TIME - INITIAL BASE CHARGE: Performed by: ANESTHESIOLOGY

## 2024-12-17 PROCEDURE — 2500000004 HC RX 250 GENERAL PHARMACY W/ HCPCS (ALT 636 FOR OP/ED)

## 2024-12-17 PROCEDURE — 3700000002 HC GENERAL ANESTHESIA TIME - EACH INCREMENTAL 1 MINUTE: Performed by: ANESTHESIOLOGY

## 2024-12-17 PROCEDURE — 3600000009 HC OR TIME - EACH INCREMENTAL 1 MINUTE - PROCEDURE LEVEL FOUR: Performed by: ANESTHESIOLOGY

## 2024-12-17 PROCEDURE — 3700000001 HC GENERAL ANESTHESIA TIME - INITIAL BASE CHARGE: Performed by: ANESTHESIOLOGY

## 2024-12-17 PROCEDURE — 95972 ALYS CPLX SP/PN NPGT W/PRGRM: CPT | Performed by: ANESTHESIOLOGY

## 2024-12-17 PROCEDURE — 2720000007 HC OR 272 NO HCPCS: Performed by: ANESTHESIOLOGY

## 2024-12-17 DEVICE — DRG TRIAL LEAD KIT
Type: IMPLANTABLE DEVICE | Site: BACK | Status: FUNCTIONAL
Brand: SLIMTIP™

## 2024-12-17 RX ORDER — LIDOCAINE HYDROCHLORIDE 10 MG/ML
INJECTION, SOLUTION INFILTRATION; PERINEURAL AS NEEDED
Status: DISCONTINUED | OUTPATIENT
Start: 2024-12-17 | End: 2024-12-17

## 2024-12-17 RX ORDER — ALBUTEROL SULFATE 0.83 MG/ML
2.5 SOLUTION RESPIRATORY (INHALATION) ONCE AS NEEDED
Status: DISCONTINUED | OUTPATIENT
Start: 2024-12-17 | End: 2024-12-17 | Stop reason: HOSPADM

## 2024-12-17 RX ORDER — HYDROMORPHONE HYDROCHLORIDE 2 MG/ML
0.4 INJECTION, SOLUTION INTRAMUSCULAR; INTRAVENOUS; SUBCUTANEOUS EVERY 5 MIN PRN
Status: DISCONTINUED | OUTPATIENT
Start: 2024-12-17 | End: 2024-12-17 | Stop reason: HOSPADM

## 2024-12-17 RX ORDER — LIDOCAINE HYDROCHLORIDE AND EPINEPHRINE 10; 10 UG/ML; MG/ML
INJECTION, SOLUTION INFILTRATION; PERINEURAL AS NEEDED
Status: DISCONTINUED | OUTPATIENT
Start: 2024-12-17 | End: 2024-12-17 | Stop reason: HOSPADM

## 2024-12-17 RX ORDER — SODIUM CHLORIDE, SODIUM LACTATE, POTASSIUM CHLORIDE, CALCIUM CHLORIDE 600; 310; 30; 20 MG/100ML; MG/100ML; MG/100ML; MG/100ML
100 INJECTION, SOLUTION INTRAVENOUS CONTINUOUS
Status: DISCONTINUED | OUTPATIENT
Start: 2024-12-17 | End: 2024-12-17 | Stop reason: HOSPADM

## 2024-12-17 RX ORDER — MEPERIDINE HYDROCHLORIDE 25 MG/ML
12.5 INJECTION INTRAMUSCULAR; INTRAVENOUS; SUBCUTANEOUS EVERY 10 MIN PRN
Status: DISCONTINUED | OUTPATIENT
Start: 2024-12-17 | End: 2024-12-17 | Stop reason: HOSPADM

## 2024-12-17 RX ORDER — MIDAZOLAM HYDROCHLORIDE 1 MG/ML
1 INJECTION, SOLUTION INTRAMUSCULAR; INTRAVENOUS ONCE AS NEEDED
Status: DISCONTINUED | OUTPATIENT
Start: 2024-12-17 | End: 2024-12-17 | Stop reason: HOSPADM

## 2024-12-17 RX ORDER — CEFAZOLIN SODIUM 2 G/100ML
2 INJECTION, SOLUTION INTRAVENOUS ONCE
Status: COMPLETED | OUTPATIENT
Start: 2024-12-17 | End: 2024-12-17

## 2024-12-17 RX ORDER — KETAMINE HYDROCHLORIDE 50 MG/ML
INJECTION, SOLUTION INTRAMUSCULAR; INTRAVENOUS AS NEEDED
Status: DISCONTINUED | OUTPATIENT
Start: 2024-12-17 | End: 2024-12-17

## 2024-12-17 RX ORDER — FENTANYL CITRATE 50 UG/ML
50 INJECTION, SOLUTION INTRAMUSCULAR; INTRAVENOUS EVERY 5 MIN PRN
Status: DISCONTINUED | OUTPATIENT
Start: 2024-12-17 | End: 2024-12-17 | Stop reason: HOSPADM

## 2024-12-17 RX ORDER — LIDOCAINE HYDROCHLORIDE 10 MG/ML
0.1 INJECTION, SOLUTION INFILTRATION; PERINEURAL ONCE
Status: DISCONTINUED | OUTPATIENT
Start: 2024-12-17 | End: 2024-12-17 | Stop reason: HOSPADM

## 2024-12-17 RX ORDER — MIDAZOLAM HYDROCHLORIDE 1 MG/ML
INJECTION, SOLUTION INTRAMUSCULAR; INTRAVENOUS AS NEEDED
Status: DISCONTINUED | OUTPATIENT
Start: 2024-12-17 | End: 2024-12-17

## 2024-12-17 RX ORDER — PROPOFOL 10 MG/ML
INJECTION, EMULSION INTRAVENOUS CONTINUOUS PRN
Status: DISCONTINUED | OUTPATIENT
Start: 2024-12-17 | End: 2024-12-17

## 2024-12-17 RX ORDER — ONDANSETRON HYDROCHLORIDE 2 MG/ML
4 INJECTION, SOLUTION INTRAVENOUS ONCE AS NEEDED
Status: DISCONTINUED | OUTPATIENT
Start: 2024-12-17 | End: 2024-12-17 | Stop reason: HOSPADM

## 2024-12-17 RX ORDER — FENTANYL CITRATE 50 UG/ML
INJECTION, SOLUTION INTRAMUSCULAR; INTRAVENOUS AS NEEDED
Status: DISCONTINUED | OUTPATIENT
Start: 2024-12-17 | End: 2024-12-17

## 2024-12-17 RX ORDER — HYDRALAZINE HYDROCHLORIDE 20 MG/ML
5 INJECTION INTRAMUSCULAR; INTRAVENOUS EVERY 30 MIN PRN
Status: DISCONTINUED | OUTPATIENT
Start: 2024-12-17 | End: 2024-12-17 | Stop reason: HOSPADM

## 2024-12-17 RX ORDER — ONDANSETRON HYDROCHLORIDE 2 MG/ML
INJECTION, SOLUTION INTRAVENOUS AS NEEDED
Status: DISCONTINUED | OUTPATIENT
Start: 2024-12-17 | End: 2024-12-17

## 2024-12-17 SDOH — HEALTH STABILITY: MENTAL HEALTH: CURRENT SMOKER: 0

## 2024-12-17 ASSESSMENT — PAIN SCALES - GENERAL
PAINLEVEL_OUTOF10: 4
PAINLEVEL_OUTOF10: 5 - MODERATE PAIN
PAINLEVEL_OUTOF10: 7
PAINLEVEL_OUTOF10: 4
PAIN_LEVEL: 2
PAINLEVEL_OUTOF10: 4

## 2024-12-17 ASSESSMENT — ENCOUNTER SYMPTOMS
GASTROINTESTINAL NEGATIVE: 1
ENDOCRINE NEGATIVE: 1
RESPIRATORY NEGATIVE: 1
JOINT SWELLING: 1
ARTHRALGIAS: 1
PSYCHIATRIC NEGATIVE: 1
EYES NEGATIVE: 1
WEAKNESS: 1
CARDIOVASCULAR NEGATIVE: 1
NUMBNESS: 1
HEMATOLOGIC/LYMPHATIC NEGATIVE: 1
CONSTITUTIONAL NEGATIVE: 1

## 2024-12-17 ASSESSMENT — PAIN DESCRIPTION - DESCRIPTORS
DESCRIPTORS: SORE
DESCRIPTORS: ACHING
DESCRIPTORS: SORE;CRAMPING

## 2024-12-17 ASSESSMENT — PAIN - FUNCTIONAL ASSESSMENT
PAIN_FUNCTIONAL_ASSESSMENT: 0-10

## 2024-12-17 ASSESSMENT — PAIN DESCRIPTION - LOCATION: LOCATION: BACK

## 2024-12-17 ASSESSMENT — PAIN DESCRIPTION - ORIENTATION: ORIENTATION: LOWER

## 2024-12-17 NOTE — POST-PROCEDURE NOTE
1613- pt brought back from pacu,verbal report received. Pt is alert and awake. Snack and drink given. Mother brought back from waiting room.    1640- vss. Dc instructions given and explained to pt and mother. Both verbally understood. Pt tolerating snack and drink.    1642- pt getting dressed at this time.     1650- transport at bedside with wheelchair for dc.

## 2024-12-17 NOTE — ANESTHESIA POSTPROCEDURE EVALUATION
"Patient: Guy Moreland \"Krishna\"    Procedure Summary       Date: 12/17/24 Room / Location: [facility] 01 / [facility]    Anesthesia Start: 1423 Anesthesia Stop: 1528    Procedure: Insertion Spinal Cord Stimulator - DRG trial Diagnosis:       Complex regional pain syndrome type 2 of right lower extremity      (Complex regional pain syndrome type 2 of right lower extremity [G57.71])    Surgeons: Roxy Arrieta MD Responsible Provider: Yash Tam MD    Anesthesia Type: MAC ASA Status: 2            Anesthesia Type: MAC    Vitals Value Taken Time   /70 12/17/24 1601   Temp 36.3 °C (97.3 °F) 12/17/24 1526   Pulse 83 12/17/24 1602   Resp 30 12/17/24 1602   SpO2 96 % 12/17/24 1602   Vitals shown include unfiled device data.    Anesthesia Post Evaluation    Patient location during evaluation: PACU  Patient participation: complete - patient participated  Level of consciousness: sleepy but conscious  Pain score: 2  Pain management: adequate  Multimodal analgesia pain management approach  Airway patency: patent  Cardiovascular status: acceptable  Respiratory status: acceptable  Hydration status: acceptable  Postoperative Nausea and Vomiting: none        There were no known notable events for this encounter.    "

## 2024-12-17 NOTE — H&P
PAIN MANAGEMENT H&P    Date of Service: 12/17/2024  SUBJECTIVE    CHIEF COMPLAINT: RLE pain    HISTORY OF PRESENT ILLNESS    Guy Moreland is a 39 y.o. male with PMH HTN, GERD, obesity, IBS, MDD/CORKY, insomnia who presents for DRG trial    Pain and overall medical condition unchanged from previous visit. Pt is appropriately NPO. Pt denies new-onset numbness, weakness, bowel/bladder incontinence.  Pt denies recent infection/abx use, allergy to Latex/iodine/contrast. Patient is currently taking the following blood thinner(s): N/A    REVIEW OF SYSTEMS  Review of Systems   Constitutional: Negative.    HENT: Negative.     Eyes: Negative.    Respiratory: Negative.     Cardiovascular: Negative.    Gastrointestinal: Negative.    Endocrine: Negative.    Musculoskeletal:  Positive for arthralgias and joint swelling.   Skin: Negative.    Neurological:  Positive for weakness and numbness.   Hematological: Negative.    Psychiatric/Behavioral: Negative.         PAST MEDICAL HISTORY  Past Medical History:   Diagnosis Date    Acute prostatitis 03/03/2019    Acute prostatitis with hematuria    Adverse effect of anesthesia     HX HA STATES MIGHT BE FROM PROPROFOL    Allergy status to unspecified drugs, medicaments and biological substances 06/08/2017    History of adverse drug reaction    Anxiety     Complex regional pain syndrome of right lower extremity     COVID-19 01/04/2022    COVID-19    Cutaneous abscess of left lower limb 08/11/2015    Abscess of knee, left    Depression     Dizziness and giddiness 03/03/2019    Dizzy spells    Eczema     Fatty liver     GERD (gastroesophageal reflux disease)     Hyperlipidemia     Hypertension     Left lower quadrant pain 06/14/2018    LLQ discomfort    Other intra-abdominal and pelvic swelling, mass and lump 07/13/2020    Nodule of groin    Other intra-abdominal and pelvic swelling, mass and lump 10/08/2020    Inguinal mass    Pain in right ankle and joints of right foot 06/03/2016     Chronic pain of right ankle    Pain in right lower leg 03/20/2020    Right calf pain    Panic attacks     Personal history of diseases of the blood and blood-forming organs and certain disorders involving the immune mechanism 06/08/2020    History of leukocytosis    Personal history of nicotine dependence 02/01/2016    History of nicotine dependence    Personal history of other (healed) physical injury and trauma 06/25/2017    History of sprain    Personal history of other diseases of the musculoskeletal system and connective tissue 06/03/2016    History of low back pain    Personal history of other endocrine, nutritional and metabolic disease 03/03/2015    History of obesity    Personal history of urinary (tract) infections 08/06/2014    Personal history of urinary tract infection    Sialolithiasis     Salivary calculus    Unspecified hearing loss, left ear 02/05/2018    Hearing loss of left ear     Past Surgical History:   Procedure Laterality Date    ABDOMINAL SURGERY      ANKLE SURGERY  01/04/2023    Ankle Surgery; total of 8 right ankle surgeries    COLONOSCOPY  06/05/2017    Colonoscopy (Fiberoptic)    HERNIA REPAIR  09/21/2020    Hernia Repair    OTHER SURGICAL HISTORY  06/05/2017    Incision And Drainage Of Skin Abscess Buttocks    OTHER SURGICAL HISTORY      necrotic lipoma removed from abdomen    OTHER SURGICAL HISTORY      I&D of left Buttock Abscess (MRSA)    TOTAL ANKLE ARTHROPLASTY Right      Family History   Problem Relation Name Age of Onset    Scleroderma Mother      Hyperlipidemia Father      Other (colon polyps, IBS, stomach polyps) Father      Raynaud syndrome Brother      Skin cancer Father's Brother      Parkinsonism Paternal Grandfather      Skin cancer Paternal Grandfather      Diabetes Other GrandFather        CURRENT MEDICATIONS  Current Facility-Administered Medications   Medication Dose Route Frequency Provider Last Rate Last Admin    ceFAZolin (Ancef) 2 g in dextrose (iso)  mL  2 g  "intravenous Once Roxy Arrieta MD           ALLERGIES AND DRUG REACTIONS  Allergies   Allergen Reactions    Sulfa (Sulfonamide Antibiotics) Unknown and Other     TOLD AS A CHILD NOT TO TAKE          OBJECTIVE  Visit Vitals  /82   Pulse 91   Temp 36.5 °C (97.7 °F) (Temporal)   Resp 18   Ht 1.829 m (6')   Wt 116 kg (255 lb)   SpO2 98%   BMI 34.58 kg/m²   Smoking Status Former   BSA 2.43 m²     General: Lying comfortably in bed, NAD  Head: NCAT  Eyes: Sclera/conjunctiva clear, EOMI, PERRL  Nose/mouth: MMM  CV: Good distal pulses  Lungs: Good/equal chest excursion  Abdomen: Soft, ND  Ext: No cyanosis/edema  MSK: Able to move extremities  Neuro: AAOx3, grossly normal  Psych: affect nl      REVIEW OF LABORATORY DATA  I have reviewed the following lab results:  No results found for: \"WBC\", \"RBC\", \"HGB\", \"HCT\", \"MCV\", \"MCH\", \"MCHC\", \"RDW\", \"PLT\", \"MPV\"  No results found for: \"NA\", \"K\", \"CO2\", \"BUN\", \"CALCIUM\"  No results found for: \"PROTIME\", \"PTT\", \"INR\", \"FIBRINOGEN\"      REVIEW OF RADIOLOGY DATA  I have reviewed the following:  Radiology Studies           CT ankle R 7/10/24:  No acute fracture or dislocation. Postsurgical changes total ankle  arthroplasty. Hardware appears intact, well positioned and well  aligned. There is no periprosthetic fracture or lucency. Solid fusion  across subtalar joint with ghost tracts related to prior arthrodesis.  Unchanged bone-on-bone articulation along the inferior aspect of the  calcaneocuboid joint. Mid and hindfoot joints are otherwise  maintained. No sizeable joint effusion.      SOFT TISSUES:  Muscle bulk is maintained. No subcutaneous fluid collection.      IMPRESSION:  Unremarkable total ankle arthroplasty.         ASSESSMENT & PLAN  Guy Moreland is a 39 y.o. male with PMH HTN, GERD, obesity, IBS, MDD/CORKY, insomnia who presents for DRG trial         1) RLE CRPS 2  -Chronic R ankle pain since fall off roof 2006 s/p 8 surgeries including total ankle replacement 2022 s/p " 3 revisions, last of which was 5/1/24.   -Persistent R ankle pain with sympathetic signs/sx meeting [place] criteria for CRPS. Pain severe and inhibiting function, ADLs, sleep; impairing QoL  -Refractive to >10 y conservative tx including Tylenol, NSAIDs, duloxetine, >6 w PT, marijuana, gabapentin, m relaxants, CSI.  -CT R ankle 7/10/24: Unremarkable total ankle arthroplasty.  -Cont duloxetine 90 mg QD  -DRG trial today  --EMG BLE 1/10/2020: mild R L5, S1 radiculopathy; mild BL S1 conduction defect; R sural sensory nerve injury  --Psych eval scheduled 10/20/24  --CT T/L-spine 10/7/24: unremarkable  -F/U 1 w          Discussed procedure risks/benefits in detail with patient. Pt meets medical necessity for procedure due to failure of conservative measures. Reviewed procedural risks including bleeding, infection, nerve damage, paralysis. Also reviewed mitigating factors such as screening for infection/blood thinner use, sterile precautions, and image-guidance when applicable. All questions answered. Pt/guardian expressed understanding and choose to proceed            Roxy Arrieta MD  Anesthesiologist & Interventional Pain Physician   [facility]  O: [phone]  F: [phone]  2:05 PM  12/17/24

## 2024-12-17 NOTE — DISCHARGE INSTRUCTIONS
DISCHARGE INSTRUCTIONS FOR DORSAL ROOT GANGLION STIMULATION TRIAL     You received DRG stimulation leads today.     -    Okay to reinforce dressing if needed.   -    Sponge bath only (do not get leads/IPG wet)   -    Do not bend at the waist, twist and lift your arms above your shoulders during the trial. This will reduce the possibility of lead movement which may result in loss of stimulation.   -    For post procedure pain, take over-the-counter Tylenol 500 mg and ibuprofen 800 mg together every 8 hours for the next 5 days. .    -    Write in your pain journal to track your pain levels.   -    Do not drive or operate heavy machinery while stimulator is turned on.   -    Follow up with Dr. Arrieta in clinic in one week.     It is recommended that you relax and limit your activity for the remainder of the day unless you have been told otherwise by your pain physician. You should not drive a car, operate machinery, or make important legal decisions unless otherwise directed by your pain physician.      Some discomfort, bruising or slight swelling may occur at the injection site. This is not abnormal if it occurs.  If needed you may:        o    Take over the counter medication such as Tylenol or Motrin.       o    Apply an ice pack for 30 minutes, 2 to 3 times a day for the first 24 hours.     You do not need to discontinue non-aspirin-containing pain medications prior to an injection (examples: Celebrex, tramadol, hydrocodone and acetaminophen).      Call the Pain Medicine Practice at [phone] between 8am-4pm Monday - Friday if you are experiencing the following:       o    Headache that does not go away with medicine, is worse when sitting or standing up, and is greatly relieved upon lying down.       o    Severe pain worse than or different than your baseline pain.       o    Chills or fever (101º F or greater).       o    Drainage or signs of infection at the injection site     Go directly to the Emergency  Department if you are experiencing the following and received an epidural or spinal injection:       o    Abrupt weakness or progressive weakness in your legs that starts after you leave the clinic.       o    Abrupt severe or worseningnumbness in your legs.       o    Inability to urinate after the injection or loss of bowel or bladder control without the urge to defecate or urinate.     If you have a clinical question that cannot wait until your next appointment, please call [phone] between 8am-4pm Monday - Friday or send a [employer] message. We do our best to return all non-emergency messages within 24 hours, Monday - Friday. A nurse or physician will return your message.      If you need to cancel an appointment please call the scheduling staff at [phone] during normal business hours or leave a message at least 24 hours in advance.

## 2024-12-17 NOTE — OP NOTE
"Insertion Spinal Cord Stimulator - DRG trial Operative Note     Date: 2024  OR Location: [facility]    Name: Guy Moreland \"Krishna\", : 1985, Age: 39 y.o., MRN: [id number], Sex: male    Diagnosis  Pre-op Diagnosis      * Complex regional pain syndrome type 2 of right lower extremity [G57.71] Post-op Diagnosis     * Complex regional pain syndrome type 2 of right lower extremity [G57.71]     Procedures  Insertion Spinal Cord Stimulator - DRG trial  24444 - CO PRQ IMPLTJ NSTIM ELECTRODE ARRAY EPIDURAL    CO PRQ IMPLTJ NSTIM ELECTRODE ARRAY EPIDURAL [75212]  CO ELEC EDUAR IMPLT NPGT CPLX SP/PN PRGRMG [90311]  Surgeons      * Roxy Arrieta - Primary    Resident/Fellow/Other Assistant:  Surgeons and Role:  * No surgeons found with a matching role *    Staff:   Circulator: Addy Mcadamsub Person: Juan Luis Mcadamsub Person: Yari Roblero Scrub: Orquidea Roblero Circulator: Misael    Anesthesia Staff: Anesthesiologist: Yash Tam MD  CRNA: MARQUIS Quan-CRNA    Procedure Summary  Anesthesia: Monitor Anesthesia Care  ASA: II  Estimated Blood Loss: Nil  Intra-op Medications: Administrations occurring from 24 1630 to 24 1630:  * No intraprocedure medications in log *           Anesthesia Record               Intraprocedure I/O Totals          Intake    Dexmedetomidine 0.00 mL    The total shown is the total volume documented since Anesthesia Start was filed.    Ketamine 0.00 mL    The total shown is the total volume documented since Anesthesia Start was filed.    Propofol Drip 0.00 mL    The total shown is the total volume documented since Anesthesia Start was filed.    Total Intake 0 mL          Specimen: No specimens collected              Drains and/or Catheters: * None in log *    Tourniquet Times:         Implants:  Implants       Type Name Action Serial No.      Neuro Interventional Implant LEAD KIT, AXIUM TRIAL, SLIMTIP, 50CM - [id number] - [id number] Implanted [id number]     Neuro Interventional " "Implant LEAD KIT, AXIUM TRIAL, SLIMTIP, 50CM - [id number] - [id number] Implanted [id number]              Indications: Guy Moreland \"Krishna\" is an 39 y.o. male who is having surgery for Complex regional pain syndrome type 2 of right lower extremity [G57.71].     DRG PERCUTANEOUS LEAD TRIAL    OPERATIVE  NOTE      NAME OF PROCEDURE:                   Percutaneous placement of dorsal root ganglion stimulator electrode array x 2  Complex programming/analysis of DRG     Proceduralist: Roxy Arrieta MD  Anesthesia: GENERAL     IMPLANTS: Molina SlimTip DRG electrode lead x2    COMPLICATIONS: none   EBL: Nil   [facility]     OPERATIVE  NOTE   The potential benefits, procedural risks and adverse effects of spinal cord stimulator implantation were discussed with the patient. Patient was told that severe complications such as nerve damage or spinal cord injury resulting in lower extremity weakness numbness, spasticity and that the potential benefit of this procedure would be decreased pain.  After reviewing the procedure with the patient, informed consent to proceed with the placement of spinal cord stimulation lead was obtained.        The patient was given 2 grams Ancef IV prior to procedure.  The patient was placed prone on the operating room table and pressure points were padded. The patient's back was then prepped with chloroprep and draped in a sterile fashion.  See anesthesia record for sedation details      The patient was placed prone on the operating room table and pressure points were padded. The patient's back was then prepped with chloroprep and draped in a sterile fashion.     Under direct fluoroscopic guidance, right L5-S1 interspace identified in AP and lateral views. Local anesthesia with lidocaine 1% mL at the left lateral S1 level. Under fluoroscopic guidance, after skin puncture with #11 blade medial to the left of the S1 pedicle, a 14 g intro needle was advanced via the left PM approach to the " "L5-S1 interspace. Using JACE saline + air technique, the epidural space was entered at the midline. Using LAT (first) and then AP pulsed fluoro, electrode #1 was advanced utilizing sheath into right L5-S1 neuroforamen. Electrode carefully threaded into neuroforamen to adequate position under live fluoro without significant resistance. Sheath withdrawn as retention loops were made in epidural space. Lateral view confirmed lead position in dorsal neuroforamen.      Right S1 neuroforamen identified under AP. A 3.5 \" 22 G Quinke needle was utilized to verify right S1 foramen in lateral view. This was removed and after administration of 1% lido, skin puncture made with #11 blade. 14 G needle advanced under lateral view until in posterior canal. Sheath advanced until anterior to sacral plate along right S1 DRG. Electrode threaded at this level to adequate position as confirmed in AP/lateral views. Sheath withdrawn as retention loops were made in epidural space     Testing performed to successfully r/o motor stim and endorse coverage.     Sheaths, needles, stylets removed under live fluoro while verifying maintenance of adequate lead placements.      After application of Mastisol, the lead(s) entry point(s) was/were covered with Stayfix, gauze, and tegaderm to protect the site.      The patient was replaced supine and transferred to PACU.   Fluoro images saved digitally.        DRG leads programmed to deliver stim off both electrodes with complex program using the middle portion of the electrodes. See record for final program     Following recovery, DC home with written instructions.   Patient will plan to follow up in clinic in 5-7 d for removal of trial stimulator leads.    Condition on Discharge: Stable and unchanged from admission   Disposition/Discharge: Home        Roxy Arrieta MD  Anesthesiologist & Interventional Pain Physician   [facility] [place]  O: [phone]  F: [phone]  3:25 PM  12/17/24      "

## 2024-12-17 NOTE — ANESTHESIA PREPROCEDURE EVALUATION
"Patient: Guy Moreland \"Krishna\"    Procedure Information       Date/Time: 12/17/24 3345    Procedure: Insertion Spinal Cord Stimulator - DRG - C-ARM, DRG Molina - Marielos    Location: [facility] / [facility]    Surgeons: Roxy Arrieta MD            Relevant Problems   Cardiac   (+) Essential hypertension   (+) Hyperlipidemia      Neuro   (+) Anxiety and depression   (+) Complex regional pain syndrome type 1 of right lower extremity   (+) Complex regional pain syndrome type 2 of right lower extremity      GI   (+) GERD (gastroesophageal reflux disease)   (+) IBS (irritable bowel syndrome)      Liver   (+) Fatty liver      Endocrine   (+) Obesity      Musculoskeletal   (+) Complex regional pain syndrome type 1 of right lower extremity   (+) Complex regional pain syndrome type 2 of right lower extremity      ID   (+) Viral URI      Skin   (+) Eczema       Clinical information reviewed:   Tobacco  Allergies  Meds   Med Hx  Surg Hx   Fam Hx  Soc Hx        NPO Detail:  NPO/Void Status  Carbohydrate Drink Given Prior to Surgery? : N  Date of Last Liquid: 12/17/24  Time of Last Liquid: 0600 (sip of clears)  Date of Last Solid: 12/16/24  Time of Last Solid: 2000  Last Intake Type: Clear fluids  Time of Last Void: 1237         Physical Exam    Airway  Mallampati: II  TM distance: >3 FB  Neck ROM: full     Cardiovascular - normal exam     Dental - normal exam     Pulmonary - normal exam     Abdominal - normal exam             Anesthesia Plan    History of general anesthesia?: yes  History of complications of general anesthesia?: no    ASA 2     MAC     The patient is not a current smoker.  Patient was not previously instructed to abstain from smoking on day of procedure.  Patient did not smoke on day of procedure.  Education provided regarding risk of obstructive sleep apnea.  intravenous induction   Postoperative administration of opioids is intended.  Trial extubation is planned.  Anesthetic plan and risks " discussed with patient.  Use of blood products discussed with patient who consented to blood products.    Plan discussed with CAA, attending and CRNA.

## 2024-12-23 ENCOUNTER — HOSPITAL ENCOUNTER (OUTPATIENT)
Dept: RADIOLOGY | Facility: CLINIC | Age: 39
Discharge: HOME | End: 2024-12-23
Payer: COMMERCIAL

## 2024-12-23 ENCOUNTER — OFFICE VISIT (OUTPATIENT)
Dept: PAIN MEDICINE | Facility: CLINIC | Age: 39
End: 2024-12-23
Payer: COMMERCIAL

## 2024-12-23 VITALS — SYSTOLIC BLOOD PRESSURE: 108 MMHG | DIASTOLIC BLOOD PRESSURE: 76 MMHG | HEART RATE: 98 BPM | OXYGEN SATURATION: 96 %

## 2024-12-23 DIAGNOSIS — Z96.82 PRESENCE OF NEUROSTIMULATOR: Primary | ICD-10-CM

## 2024-12-23 DIAGNOSIS — M25.561 CHRONIC PAIN OF RIGHT KNEE: ICD-10-CM

## 2024-12-23 DIAGNOSIS — G57.71 COMPLEX REGIONAL PAIN SYNDROME TYPE 2 OF RIGHT LOWER EXTREMITY: ICD-10-CM

## 2024-12-23 DIAGNOSIS — Z96.82 PRESENCE OF NEUROSTIMULATOR: ICD-10-CM

## 2024-12-23 DIAGNOSIS — G89.29 CHRONIC PAIN OF RIGHT KNEE: ICD-10-CM

## 2024-12-23 PROCEDURE — 99214 OFFICE O/P EST MOD 30 MIN: CPT | Performed by: ANESTHESIOLOGY

## 2024-12-23 PROCEDURE — 72100 X-RAY EXAM L-S SPINE 2/3 VWS: CPT

## 2024-12-23 PROCEDURE — 99214 OFFICE O/P EST MOD 30 MIN: CPT | Mod: 24 | Performed by: ANESTHESIOLOGY

## 2024-12-23 PROCEDURE — 3078F DIAST BP <80 MM HG: CPT | Performed by: ANESTHESIOLOGY

## 2024-12-23 PROCEDURE — 3074F SYST BP LT 130 MM HG: CPT | Performed by: ANESTHESIOLOGY

## 2024-12-23 PROCEDURE — 1036F TOBACCO NON-USER: CPT | Performed by: ANESTHESIOLOGY

## 2024-12-23 PROCEDURE — 72220 X-RAY EXAM SACRUM TAILBONE: CPT

## 2024-12-23 ASSESSMENT — ENCOUNTER SYMPTOMS
PSYCHIATRIC NEGATIVE: 1
JOINT SWELLING: 1
HEMATOLOGIC/LYMPHATIC NEGATIVE: 1
NUMBNESS: 1
RESPIRATORY NEGATIVE: 1
ENDOCRINE NEGATIVE: 1
CARDIOVASCULAR NEGATIVE: 1
LOSS OF SENSATION IN FEET: 0
OCCASIONAL FEELINGS OF UNSTEADINESS: 0
ARTHRALGIAS: 1
DEPRESSION: 0
EYES NEGATIVE: 1
WEAKNESS: 1
GASTROINTESTINAL NEGATIVE: 1
CONSTITUTIONAL NEGATIVE: 1

## 2024-12-23 ASSESSMENT — PAIN SCALES - GENERAL
PAINLEVEL_OUTOF10: 3
PAINLEVEL_OUTOF10: 3

## 2024-12-23 ASSESSMENT — PAIN - FUNCTIONAL ASSESSMENT: PAIN_FUNCTIONAL_ASSESSMENT: 0-10

## 2024-12-23 ASSESSMENT — PATIENT HEALTH QUESTIONNAIRE - PHQ9
SUM OF ALL RESPONSES TO PHQ9 QUESTIONS 1 AND 2: 0
2. FEELING DOWN, DEPRESSED OR HOPELESS: NOT AT ALL
1. LITTLE INTEREST OR PLEASURE IN DOING THINGS: NOT AT ALL

## 2024-12-23 NOTE — LETTER
December 23, 2024     Twila Ford    Patient: Krishna Moreland   YOB: 1985   Date of Visit: 12/23/2024       Dear Twila Ford:    Plz schedule R L5, S1 DRG stimulator implant        Sincerely,     Roxy Arrieta MD      CC: No Recipients  ______________________________________________________________________________________    PAIN MANAGEMENT FOLLOW-UP OFFICE NOTE    Date of Service: 12/23/2024    SUBJECTIVE    CHIEF COMPLAINT: R ankle pain.     HISTORY OF PRESENT ILLNESS    Guy Moreland is a 39 y.o. male pt of Dr Chino with PMH HTN, GERD, obesity, IBS, MDD/CORKY, insomnia who presents for F/U     On 12/17, pt underwent R L5, S1 DRG trial with 100% relief for 3 d. On 12/20, pt twisted at work and felt a decrease in pain relief. On meeting with [email or web address], adjustment in amplitude was able to restore stimulation and pain relief. Pt very pleased with outcome. Denies SE or issues. He would like to move fwd with implant.     Pt denies new-onset bowel/bladder incontinence.  Pt denies recent infection, allergy to Latex/iodine/contrast. Patient is currently taking the following blood thinner(s): N/A    Procedure log:  -R L5, S1 DRG trial 12/17/24: 100% relief    REVIEW OF SYSTEMS  Review of Systems   Constitutional: Negative.    HENT: Negative.     Eyes: Negative.    Respiratory: Negative.     Cardiovascular: Negative.    Gastrointestinal: Negative.    Endocrine: Negative.    Musculoskeletal:  Positive for arthralgias and joint swelling.   Skin: Negative.    Neurological:  Positive for weakness and numbness.   Hematological: Negative.    Psychiatric/Behavioral: Negative.         PAST MEDICAL HISTORY  Past Medical History:   Diagnosis Date   • Acute prostatitis 03/03/2019    Acute prostatitis with hematuria   • Adverse effect of anesthesia     HX HA STATES MIGHT BE FROM PROPROFOL   • Allergy status to unspecified drugs, medicaments and biological substances 06/08/2017    History of adverse drug  reaction   • Anxiety    • Complex regional pain syndrome of right lower extremity    • COVID-19 01/04/2022    COVID-19   • Cutaneous abscess of left lower limb 08/11/2015    Abscess of knee, left   • Depression    • Dizziness and giddiness 03/03/2019    Dizzy spells   • Eczema    • Fatty liver    • GERD (gastroesophageal reflux disease)    • Hyperlipidemia    • Hypertension    • Left lower quadrant pain 06/14/2018    LLQ discomfort   • Other intra-abdominal and pelvic swelling, mass and lump 07/13/2020    Nodule of groin   • Other intra-abdominal and pelvic swelling, mass and lump 10/08/2020    Inguinal mass   • Pain in right ankle and joints of right foot 06/03/2016    Chronic pain of right ankle   • Pain in right lower leg 03/20/2020    Right calf pain   • Panic attacks    • Personal history of diseases of the blood and blood-forming organs and certain disorders involving the immune mechanism 06/08/2020    History of leukocytosis   • Personal history of nicotine dependence 02/01/2016    History of nicotine dependence   • Personal history of other (healed) physical injury and trauma 06/25/2017    History of sprain   • Personal history of other diseases of the musculoskeletal system and connective tissue 06/03/2016    History of low back pain   • Personal history of other endocrine, nutritional and metabolic disease 03/03/2015    History of obesity   • Personal history of urinary (tract) infections 08/06/2014    Personal history of urinary tract infection   • Sialolithiasis     Salivary calculus   • Unspecified hearing loss, left ear 02/05/2018    Hearing loss of left ear     Past Surgical History:   Procedure Laterality Date   • ABDOMINAL SURGERY     • ANKLE SURGERY  01/04/2023    Ankle Surgery; total of 8 right ankle surgeries   • COLONOSCOPY  06/05/2017    Colonoscopy (Fiberoptic)   • HERNIA REPAIR  09/21/2020    Hernia Repair   • OTHER SURGICAL HISTORY  06/05/2017    Incision And Drainage Of Skin Abscess  Buttocks   • OTHER SURGICAL HISTORY      necrotic lipoma removed from abdomen   • OTHER SURGICAL HISTORY      I&D of left Buttock Abscess (MRSA)   • TOTAL ANKLE ARTHROPLASTY Right      Family History   Problem Relation Name Age of Onset   • Scleroderma Mother     • Hyperlipidemia Father     • Other (colon polyps, IBS, stomach polyps) Father     • Raynaud syndrome Brother     • Skin cancer Father's Brother     • Parkinsonism Paternal Grandfather     • Skin cancer Paternal Grandfather     • Diabetes Other GrandFather        CURRENT MEDICATIONS  Current Outpatient Medications   Medication Sig Dispense Refill   • ALPRAZolam (Xanax) 1 mg tablet TAKE ONE TABLET BY MOUTH TWO TIMES A DAY AS NEEDED FOR ANXIETY FOR UP TO 15 DAYS (Patient not taking: Reported on 12/17/2024) 30 tablet 0   • atorvastatin (Lipitor) 40 mg tablet TAKE ONE TABLET BY MOUTH EVERY DAY 90 tablet 3   • busPIRone (Buspar) 15 mg tablet Take 1 tablet (15 mg) by mouth 2 times a day. 180 tablet 3   • DULoxetine (Cymbalta) 30 mg DR capsule TAKE THREE (3) CAPSULES BY MOUTH ONCE DAILY. DO NOT CRUSH OR CHEW. 90 capsule 2   • esomeprazole (NexIUM) 40 mg DR capsule Take 1 capsule (40 mg) by mouth once daily. 90 capsule 3   • lisinopril 40 mg tablet Take 1 tablet (40 mg) by mouth once daily. 90 tablet 1   • QUEtiapine (SEROquel) 100 mg tablet TAKE ONE TABLET BY MOUTH AT BEDTIME 90 tablet 1   • semaglutide (OZEMPIC SUBQ) Inject 2.4 mg under the skin 1 (one) time per week in the early morning.. Delivered in via and patient self administers 96 units=2.4mg  Sundays       No current facility-administered medications for this visit.       ALLERGIES AND DRUG REACTIONS  Allergies   Allergen Reactions   • Sulfa (Sulfonamide Antibiotics) Unknown and Other     TOLD AS A CHILD NOT TO TAKE          OBJECTIVE  Visit Vitals  /76   Pulse 98   SpO2 96%   Smoking Status Former       Last Recorded Pain Score (if available):        Pain Score:   3       Physical Exam  General:  Sitting in chair, NAD  Head: NCAT  Eyes: Sclera/conjunctiva clear, EOMI, PERRL  Nose/mouth: MMM  CV: Good distal pulses  Lungs: Good/equal chest excursion  Abdomen: Soft, ND  Ext: No cyanosis/edema  MSK: RLE: red/dusky dorsal foot compared to L assoc with moderate non-pitting edema and surgical changes. Ankle circumferentially TTP, dorsiflexion structurally limited. Allodynia medial ankle.  R knee: no erythema, mild swelling. Full ext, pain on full flexion. No laxity, neg anterior/posterior drawer    Neuro: AAOx3, CN grossly nl  Dermatome sensation to light touch  LEFT L1 (lower pelvis/upper thigh): WNL    RIGHT L1: WNL      LEFT L2 (upper thigh): WNL       RIGHT: L2:WNL      LEFT L3 (medial knee): WNL       RIGHT L3: WNL      LEFT L4 (superior medial malleolus): WNL       RIGHT L4: WNL      LEFT L5 (dorsal foot): WNL       RIGHT L5: WNL      LEFT S1 (lateral foot): WNL     RIGHT S1: WNL      LEFT S2 (popliteal fossa): WNL    RIGHT S2: WNL        Motor strength  LEFT L2 (hip flexion): 5/5   RIGHT L2: 5/5  LEFT L3 (knee extension): 5/5     RIGHT L3: 5/5  LEFT L4 (dorsiflexion): 5/5     RIGHT L4: 5/5  LEFT L5 (EHL extension): 5/5     RIGHT L5: 5/5  LEFT S1 (plantarflexion): 5/5     RIGHT S1: 5/5  LEFT S2 (knee flexion): 5/5      RIGHT S2: 5/5        Psych: affect nl  Skin: no rash/lesions. DRG leads removed without issue with tips intact. Hemostasis achieved. Band-aids placed      REVIEW OF LABORATORY DATA  I have reviewed the following lab results:  WBC   Date Value Ref Range Status   12/13/2024 7.9 4.4 - 11.3 x10*3/uL Final     RBC   Date Value Ref Range Status   12/13/2024 5.25 4.50 - 5.90 x10*6/uL Final     Hemoglobin   Date Value Ref Range Status   12/13/2024 15.2 13.5 - 17.5 g/dL Final     Hematocrit   Date Value Ref Range Status   12/13/2024 45.8 41.0 - 52.0 % Final     MCV   Date Value Ref Range Status   12/13/2024 87 80 - 100 fL Final     MCH   Date Value Ref Range Status   12/13/2024 29.0 26.0 - 34.0 pg  Final     MCHC   Date Value Ref Range Status   12/13/2024 33.2 32.0 - 36.0 g/dL Final     RDW   Date Value Ref Range Status   12/13/2024 13.2 11.5 - 14.5 % Final     Platelets   Date Value Ref Range Status   12/13/2024 394 150 - 450 x10*3/uL Final     MPV   Date Value Ref Range Status   10/07/2022 9.2 7.0 - 12.6 CU Final     Sodium   Date Value Ref Range Status   12/13/2024 139 136 - 145 mmol/L Final     Potassium   Date Value Ref Range Status   12/13/2024 4.2 3.5 - 5.3 mmol/L Final     Bicarbonate   Date Value Ref Range Status   12/13/2024 29 21 - 32 mmol/L Final     Urea Nitrogen   Date Value Ref Range Status   12/13/2024 11 6 - 23 mg/dL Final     Calcium   Date Value Ref Range Status   12/13/2024 10.3 8.6 - 10.3 mg/dL Final     Protime   Date Value Ref Range Status   06/26/2021 12.0 10.1 - 13.3 sec Final     INR   Date Value Ref Range Status   06/26/2021 1.0 0.9 - 1.1 Final         REVIEW OF RADIOLOGY   I have reviewed the following:  Radiology Studies           CT ankle R 7/10/24:  No acute fracture or dislocation. Postsurgical changes total ankle  arthroplasty. Hardware appears intact, well positioned and well  aligned. There is no periprosthetic fracture or lucency. Solid fusion  across subtalar joint with ghost tracts related to prior arthrodesis.  Unchanged bone-on-bone articulation along the inferior aspect of the  calcaneocuboid joint. Mid and hindfoot joints are otherwise  maintained. No sizeable joint effusion.      SOFT TISSUES:  Muscle bulk is maintained. No subcutaneous fluid collection.      IMPRESSION:  Unremarkable total ankle arthroplasty.       ASSESSMENT & PLAN  Guy Moreland is a 39 y.o. male pt of Dr Chino with PMH HTN, GERD, obesity, IBS, MDD/CORKY, insomnia who presents for F/U R ankle pain likely 2/2 CRPS    1) RLE CRPS 2  -Chronic R ankle pain since fall off roof 2006 s/p 8 surgeries including total ankle replacement 2022 s/p 3 revisions, last of which was 5/1/24.   -Persistent R ankle  pain with sympathetic signs/sx meeting [place] criteria for CRPS. Pain severe and inhibiting function, ADLs, sleep; impairing QoL  -Refractive to >10 y conservative tx including Tylenol, NSAIDs, duloxetine, >6 w PT, marijuana, gabapentin, m relaxants, CSI.  -CT R ankle 7/10/24: Unremarkable total ankle arthroplasty.  -Cont duloxetine 90 mg QD  -DRG work-up:  --EMG BLE 1/10/2020: mild R L5, S1 radiculopathy; mild BL S1 conduction defect; R sural sensory nerve injury  --Psych eval scheduled 10/20  --CT T/L-spine 10/7/24: unremarkable  ---R L5, S1 DRG trial 12/17/24: 100% relief  --Schedule R L5, S1 DRG stimulator implant to target pain generator and minimize risk/likelihood of chronic opioid use and/or surgery    2) R knee pain, stable  -Since 2021 attributed to gait change from R ankle CRPS   -Refractive to yrs of conservative tx including Tylenol, NSAIDs, duloxetine   -Non-adherent to XR R knee 10/16/24  -Discussed utility of CSI, but pt declined  -F/U 4-6 w      Discussed procedure risks/benefits in detail with patient. Pt meets medical necessity for procedure due to failure of conservative measures. Reviewed procedural risks including bleeding, infection, nerve damage, paralysis. Also reviewed mitigating factors such as screening for infection/blood thinner use, sterile precautions, and image-guidance when applicable. All questions answered. Pt/guardian expressed understanding and choose to proceed             Roxy Arrieta MD  Anesthesiologist & Interventional Pain Physician   [facility] [place]  O: [phone]  F: [phone]  11:58 AM  12/23/24

## 2024-12-23 NOTE — PROGRESS NOTES
PAIN MANAGEMENT FOLLOW-UP OFFICE NOTE    Date of Service: 12/23/2024    SUBJECTIVE    CHIEF COMPLAINT: R ankle pain.     HISTORY OF PRESENT ILLNESS    Guy Moreland is a 39 y.o. male pt of Dr Chino with PMH HTN, GERD, obesity, IBS, MDD/CORKY, insomnia who presents for F/U     On 12/17, pt underwent R L5, S1 DRG trial with 100% relief for 3 d. On 12/20, pt twisted at work and felt a decrease in pain relief. On meeting with [facility], adjustment in amplitude was able to restore stimulation and pain relief. Pt very pleased with outcome. Denies SE or issues. He would like to move fwd with implant.     Pt denies new-onset bowel/bladder incontinence.  Pt denies recent infection, allergy to Latex/iodine/contrast. Patient is currently taking the following blood thinner(s): N/A    Procedure log:  -R L5, S1 DRG trial 12/17/24: 100% relief    REVIEW OF SYSTEMS  Review of Systems   Constitutional: Negative.    HENT: Negative.     Eyes: Negative.    Respiratory: Negative.     Cardiovascular: Negative.    Gastrointestinal: Negative.    Endocrine: Negative.    Musculoskeletal:  Positive for arthralgias and joint swelling.   Skin: Negative.    Neurological:  Positive for weakness and numbness.   Hematological: Negative.    Psychiatric/Behavioral: Negative.         PAST MEDICAL HISTORY  Past Medical History:   Diagnosis Date    Acute prostatitis 03/03/2019    Acute prostatitis with hematuria    Adverse effect of anesthesia     HX HA STATES MIGHT BE FROM PROPROFOL    Allergy status to unspecified drugs, medicaments and biological substances 06/08/2017    History of adverse drug reaction    Anxiety     Complex regional pain syndrome of right lower extremity     COVID-19 01/04/2022    COVID-19    Cutaneous abscess of left lower limb 08/11/2015    Abscess of knee, left    Depression     Dizziness and giddiness 03/03/2019    Dizzy spells    Eczema     Fatty liver     GERD (gastroesophageal reflux disease)     Hyperlipidemia      Hypertension     Left lower quadrant pain 06/14/2018    LLQ discomfort    Other intra-abdominal and pelvic swelling, mass and lump 07/13/2020    Nodule of groin    Other intra-abdominal and pelvic swelling, mass and lump 10/08/2020    Inguinal mass    Pain in right ankle and joints of right foot 06/03/2016    Chronic pain of right ankle    Pain in right lower leg 03/20/2020    Right calf pain    Panic attacks     Personal history of diseases of the blood and blood-forming organs and certain disorders involving the immune mechanism 06/08/2020    History of leukocytosis    Personal history of nicotine dependence 02/01/2016    History of nicotine dependence    Personal history of other (healed) physical injury and trauma 06/25/2017    History of sprain    Personal history of other diseases of the musculoskeletal system and connective tissue 06/03/2016    History of low back pain    Personal history of other endocrine, nutritional and metabolic disease 03/03/2015    History of obesity    Personal history of urinary (tract) infections 08/06/2014    Personal history of urinary tract infection    Sialolithiasis     Salivary calculus    Unspecified hearing loss, left ear 02/05/2018    Hearing loss of left ear     Past Surgical History:   Procedure Laterality Date    ABDOMINAL SURGERY      ANKLE SURGERY  01/04/2023    Ankle Surgery; total of 8 right ankle surgeries    COLONOSCOPY  06/05/2017    Colonoscopy (Fiberoptic)    HERNIA REPAIR  09/21/2020    Hernia Repair    OTHER SURGICAL HISTORY  06/05/2017    Incision And Drainage Of Skin Abscess Buttocks    OTHER SURGICAL HISTORY      necrotic lipoma removed from abdomen    OTHER SURGICAL HISTORY      I&D of left Buttock Abscess (MRSA)    TOTAL ANKLE ARTHROPLASTY Right      Family History   Problem Relation Name Age of Onset    Scleroderma Mother      Hyperlipidemia Father      Other (colon polyps, IBS, stomach polyps) Father      Raynaud syndrome Brother      Skin cancer  Father's Brother      Parkinsonism Paternal Grandfather      Skin cancer Paternal Grandfather      Diabetes Other GrandFather        CURRENT MEDICATIONS  Current Outpatient Medications   Medication Sig Dispense Refill    ALPRAZolam (Xanax) 1 mg tablet TAKE ONE TABLET BY MOUTH TWO TIMES A DAY AS NEEDED FOR ANXIETY FOR UP TO 15 DAYS (Patient not taking: Reported on 12/17/2024) 30 tablet 0    atorvastatin (Lipitor) 40 mg tablet TAKE ONE TABLET BY MOUTH EVERY DAY 90 tablet 3    busPIRone (Buspar) 15 mg tablet Take 1 tablet (15 mg) by mouth 2 times a day. 180 tablet 3    DULoxetine (Cymbalta) 30 mg DR capsule TAKE THREE (3) CAPSULES BY MOUTH ONCE DAILY. DO NOT CRUSH OR CHEW. 90 capsule 2    esomeprazole (NexIUM) 40 mg DR capsule Take 1 capsule (40 mg) by mouth once daily. 90 capsule 3    lisinopril 40 mg tablet Take 1 tablet (40 mg) by mouth once daily. 90 tablet 1    QUEtiapine (SEROquel) 100 mg tablet TAKE ONE TABLET BY MOUTH AT BEDTIME 90 tablet 1    semaglutide (OZEMPIC SUBQ) Inject 2.4 mg under the skin 1 (one) time per week in the early morning.. Delivered in via and patient self administers 96 units=2.4mg  Sundays       No current facility-administered medications for this visit.       ALLERGIES AND DRUG REACTIONS  Allergies   Allergen Reactions    Sulfa (Sulfonamide Antibiotics) Unknown and Other     TOLD AS A CHILD NOT TO TAKE          OBJECTIVE  Visit Vitals  /76   Pulse 98   SpO2 96%   Smoking Status Former       Last Recorded Pain Score (if available):        Pain Score:   3       Physical Exam  General: Sitting in chair, NAD  Head: NCAT  Eyes: Sclera/conjunctiva clear, EOMI, PERRL  Nose/mouth: MMM  CV: Good distal pulses  Lungs: Good/equal chest excursion  Abdomen: Soft, ND  Ext: No cyanosis/edema  MSK: RLE: red/dusky dorsal foot compared to L assoc with moderate non-pitting edema and surgical changes. Ankle circumferentially TTP, dorsiflexion structurally limited. Allodynia medial ankle.  R knee: no  erythema, mild swelling. Full ext, pain on full flexion. No laxity, neg anterior/posterior drawer    Neuro: AAOx3, CN grossly nl  Dermatome sensation to light touch  LEFT L1 (lower pelvis/upper thigh): WNL    RIGHT L1: WNL      LEFT L2 (upper thigh): WNL       RIGHT: L2:WNL      LEFT L3 (medial knee): WNL       RIGHT L3: WNL      LEFT L4 (superior medial malleolus): WNL       RIGHT L4: WNL      LEFT L5 (dorsal foot): WNL       RIGHT L5: WNL      LEFT S1 (lateral foot): WNL     RIGHT S1: WNL      LEFT S2 (popliteal fossa): WNL    RIGHT S2: WNL        Motor strength  LEFT L2 (hip flexion): 5/5   RIGHT L2: 5/5  LEFT L3 (knee extension): 5/5     RIGHT L3: 5/5  LEFT L4 (dorsiflexion): 5/5     RIGHT L4: 5/5  LEFT L5 (EHL extension): 5/5     RIGHT L5: 5/5  LEFT S1 (plantarflexion): 5/5     RIGHT S1: 5/5  LEFT S2 (knee flexion): 5/5      RIGHT S2: 5/5        Psych: affect nl  Skin: no rash/lesions. DRG leads removed without issue with tips intact. Hemostasis achieved. Band-aids placed      REVIEW OF LABORATORY DATA  I have reviewed the following lab results:  WBC   Date Value Ref Range Status   12/13/2024 7.9 4.4 - 11.3 x10*3/uL Final     RBC   Date Value Ref Range Status   12/13/2024 5.25 4.50 - 5.90 x10*6/uL Final     Hemoglobin   Date Value Ref Range Status   12/13/2024 15.2 13.5 - 17.5 g/dL Final     Hematocrit   Date Value Ref Range Status   12/13/2024 45.8 41.0 - 52.0 % Final     MCV   Date Value Ref Range Status   12/13/2024 87 80 - 100 fL Final     MCH   Date Value Ref Range Status   12/13/2024 29.0 26.0 - 34.0 pg Final     MCHC   Date Value Ref Range Status   12/13/2024 33.2 32.0 - 36.0 g/dL Final     RDW   Date Value Ref Range Status   12/13/2024 13.2 11.5 - 14.5 % Final     Platelets   Date Value Ref Range Status   12/13/2024 394 150 - 450 x10*3/uL Final     MPV   Date Value Ref Range Status   10/07/2022 9.2 7.0 - 12.6 CU Final     Sodium   Date Value Ref Range Status   12/13/2024 139 136 - 145 mmol/L Final      Potassium   Date Value Ref Range Status   12/13/2024 4.2 3.5 - 5.3 mmol/L Final     Bicarbonate   Date Value Ref Range Status   12/13/2024 29 21 - 32 mmol/L Final     Urea Nitrogen   Date Value Ref Range Status   12/13/2024 11 6 - 23 mg/dL Final     Calcium   Date Value Ref Range Status   12/13/2024 10.3 8.6 - 10.3 mg/dL Final     Protime   Date Value Ref Range Status   06/26/2021 12.0 10.1 - 13.3 sec Final     INR   Date Value Ref Range Status   06/26/2021 1.0 0.9 - 1.1 Final         REVIEW OF RADIOLOGY   I have reviewed the following:  Radiology Studies           CT ankle R 7/10/24:  No acute fracture or dislocation. Postsurgical changes total ankle  arthroplasty. Hardware appears intact, well positioned and well  aligned. There is no periprosthetic fracture or lucency. Solid fusion  across subtalar joint with ghost tracts related to prior arthrodesis.  Unchanged bone-on-bone articulation along the inferior aspect of the  calcaneocuboid joint. Mid and hindfoot joints are otherwise  maintained. No sizeable joint effusion.      SOFT TISSUES:  Muscle bulk is maintained. No subcutaneous fluid collection.      IMPRESSION:  Unremarkable total ankle arthroplasty.       ASSESSMENT & PLAN  Guy Moreland is a 39 y.o. male pt of Dr Chino with PMH HTN, GERD, obesity, IBS, MDD/CORKY, insomnia who presents for F/U R ankle pain likely 2/2 CRPS    1) RLE CRPS 2  -Chronic R ankle pain since fall off roof 2006 s/p 8 surgeries including total ankle replacement 2022 s/p 3 revisions, last of which was 5/1/24.   -Persistent R ankle pain with sympathetic signs/sx meeting [place] criteria for CRPS. Pain severe and inhibiting function, ADLs, sleep; impairing QoL  -Refractive to >10 y conservative tx including Tylenol, NSAIDs, duloxetine, >6 w PT, marijuana, gabapentin, m relaxants, CSI.  -CT R ankle 7/10/24: Unremarkable total ankle arthroplasty.  -Cont duloxetine 90 mg QD  -DRG work-up:  --EMG BLE 1/10/2020: mild R L5, S1  radiculopathy; mild BL S1 conduction defect; R sural sensory nerve injury  --Psych eval scheduled 10/20  --CT T/L-spine 10/7/24: unremarkable  ---R L5, S1 DRG trial 12/17/24: 100% relief. Lost stimulation after twisting at work against instructions, but regained with inc'd amplitude. XR shown small retrograde migration of R L5 lead   --Schedule R L5, S1 DRG stimulator implant to target pain generator and minimize risk/likelihood of chronic opioid use and/or surgery    2) R knee pain, stable  -Since 2021 attributed to gait change from R ankle CRPS   -Refractive to yrs of conservative tx including Tylenol, NSAIDs, duloxetine   -Non-adherent to XR R knee 10/16/24  -Discussed utility of CSI, but pt declined  -F/U 4-6 w      Discussed procedure risks/benefits in detail with patient. Pt meets medical necessity for procedure due to failure of conservative measures. Reviewed procedural risks including bleeding, infection, nerve damage, paralysis. Also reviewed mitigating factors such as screening for infection/blood thinner use, sterile precautions, and image-guidance when applicable. All questions answered. Pt/guardian expressed understanding and choose to proceed             Roxy Arrieta MD  Anesthesiologist & Interventional Pain Physician   [facility]  O: [phone]  F: [phone]  11:58 AM  12/23/24

## 2025-02-06 ENCOUNTER — PREP FOR PROCEDURE (OUTPATIENT)
Dept: PAIN MEDICINE | Facility: CLINIC | Age: 40
End: 2025-02-06
Payer: COMMERCIAL

## 2025-02-06 DIAGNOSIS — G57.71 COMPLEX REGIONAL PAIN SYNDROME TYPE 2 OF RIGHT LOWER EXTREMITY: Primary | ICD-10-CM

## 2025-02-06 RX ORDER — CEFAZOLIN SODIUM 2 G/100ML
2 INJECTION, SOLUTION INTRAVENOUS ONCE
OUTPATIENT
Start: 2025-02-06 | End: 2025-02-06

## 2025-02-26 ENCOUNTER — PRE-ADMISSION TESTING (OUTPATIENT)
Dept: PREADMISSION TESTING | Facility: HOSPITAL | Age: 40
End: 2025-02-26
Payer: COMMERCIAL

## 2025-02-26 VITALS
DIASTOLIC BLOOD PRESSURE: 82 MMHG | OXYGEN SATURATION: 98 % | HEIGHT: 72 IN | BODY MASS INDEX: 34.2 KG/M2 | WEIGHT: 252.5 LBS | HEART RATE: 97 BPM | SYSTOLIC BLOOD PRESSURE: 135 MMHG | TEMPERATURE: 98.6 F

## 2025-02-26 DIAGNOSIS — Z01.818 PREOP TESTING: Primary | ICD-10-CM

## 2025-02-26 PROCEDURE — 87081 CULTURE SCREEN ONLY: CPT | Mod: WESLAB

## 2025-02-26 PROCEDURE — 99203 OFFICE O/P NEW LOW 30 MIN: CPT | Performed by: NURSE PRACTITIONER

## 2025-02-26 RX ORDER — MULTIVIT-MIN/IRON FUM/FOLIC AC 7.5 MG-4
1 TABLET ORAL DAILY
COMMUNITY

## 2025-02-26 ASSESSMENT — DUKE ACTIVITY SCORE INDEX (DASI)
CAN YOU WALK A BLOCK OR TWO ON LEVEL GROUND: YES
CAN YOU DO YARD WORK LIKE RAKING LEAVES, WEEDING OR PUSHING A MOWER: NO
CAN YOU RUN A SHORT DISTANCE: NO
CAN YOU PARTICIPATE IN MODERATE RECREATIONAL ACTIVITIES LIKE GOLF, BOWLING, DANCING, DOUBLES TENNIS OR THROWING A BASEBALL OR FOOTBALL: NO
CAN YOU CLIMB A FLIGHT OF STAIRS OR WALK UP A HILL: YES
CAN YOU DO LIGHT WORK AROUND THE HOUSE LIKE DUSTING OR WASHING DISHES: YES
DASI METS SCORE: 5.7
CAN YOU TAKE CARE OF YOURSELF (EAT, DRESS, BATHE, OR USE TOILET): YES
CAN YOU PARTICIPATE IN STRENOUS SPORTS LIKE SWIMMING, SINGLES TENNIS, FOOTBALL, BASKETBALL, OR SKIING: NO
CAN YOU WALK INDOORS, SUCH AS AROUND YOUR HOUSE: YES
CAN YOU DO MODERATE WORK AROUND THE HOUSE LIKE VACUUMING, SWEEPING FLOORS OR CARRYING GROCERIES: YES
TOTAL_SCORE: 24.2
CAN YOU DO HEAVY WORK AROUND THE HOUSE LIKE SCRUBBING FLOORS OR LIFTING AND MOVING HEAVY FURNITURE: NO
CAN YOU HAVE SEXUAL RELATIONS: YES

## 2025-02-26 ASSESSMENT — ENCOUNTER SYMPTOMS
RESPIRATORY NEGATIVE: 1
CONSTITUTIONAL NEGATIVE: 1
GASTROINTESTINAL NEGATIVE: 1
ENDOCRINE NEGATIVE: 1
ALLERGIC/IMMUNOLOGIC NEGATIVE: 1
HEADACHES: 1
CARDIOVASCULAR NEGATIVE: 1
HEMATOLOGIC/LYMPHATIC NEGATIVE: 1
EYES NEGATIVE: 1
PSYCHIATRIC NEGATIVE: 1

## 2025-02-26 ASSESSMENT — PAIN DESCRIPTION - DESCRIPTORS: DESCRIPTORS: BURNING;SHOOTING

## 2025-02-26 ASSESSMENT — PAIN - FUNCTIONAL ASSESSMENT: PAIN_FUNCTIONAL_ASSESSMENT: 0-10

## 2025-02-26 ASSESSMENT — PAIN SCALES - GENERAL: PAINLEVEL_OUTOF10: 7

## 2025-02-26 NOTE — PREPROCEDURE INSTRUCTIONS
Medication List            Accurate as of February 26, 2025 12:58 PM. Always use your most recent med list.                atorvastatin 40 mg tablet  Commonly known as: Lipitor  TAKE ONE TABLET BY MOUTH EVERY DAY  Medication Adjustments for Surgery: Take on the morning of surgery     busPIRone 15 mg tablet  Commonly known as: Buspar  Take 1 tablet (15 mg) by mouth 2 times a day.  Medication Adjustments for Surgery: Take/Use as prescribed  Notes to patient: May take morning of procedure if needed     DULoxetine 30 mg DR capsule  Commonly known as: Cymbalta  TAKE THREE (3) CAPSULES BY MOUTH ONCE DAILY. DO NOT CRUSH OR CHEW.  Medication Adjustments for Surgery: Take on the morning of surgery     esomeprazole 40 mg DR capsule  Commonly known as: NexIUM  Take 1 capsule (40 mg) by mouth once daily.  Medication Adjustments for Surgery: Take on the morning of surgery     lisinopril 40 mg tablet  Take 1 tablet (40 mg) by mouth once daily.  Medication Adjustments for Surgery: Do Not take on the morning of surgery     multivitamin with minerals tablet  Additional Medication Adjustments for Surgery: Take last dose 7 days before surgery     QUEtiapine 100 mg tablet  Commonly known as: SEROquel  TAKE ONE TABLET BY MOUTH AT BEDTIME  Medication Adjustments for Surgery: Take/Use as prescribed                              NPO Instructions:    Do not eat any food after midnight the night before your surgery/procedure.    Additional Instructions:     Day of Surgery:  Review your medication instructions, take indicated medications  Wear  comfortable loose fitting clothing  Do not use moisturizers, creams, lotions or perfume  All jewelry and valuables should be left at home          Why must I stop eating and drinking near surgery time?  With sedation, food or liquid in your stomach can enter your lungs causing serious complications  Increases nausea and vomiting    When do I need to stop eating and drinking before my surgery?   Do  not eat or drink after midnight the night before your surgery/procedure.  You may have small sips of water to take your medication.        Patient Information: Pre-Operative Infection Prevention Measures     Why did I have my nose, under my arms, and groin swabbed?  The purpose of the swab is to identify Staphylococcus aureus inside your nose or on your skin.  The swab was sent to the laboratory for culture.  A positive swab/culture for Staphylococcus aureus is called colonization or carriage.      What is Staphylococcus aureus?  Staphylococcus aureus, also known as “staph”, is a germ found on the skin or in the nose of healthy people.  Sometimes Staphylococcus aureus can get into the body and cause an infection.  This can be minor (such as pimples, boils, or other skin problems).  It might also be serious (such as a blood infection, pneumonia, or a surgical site infection).    What is Staphylococcus aureus colonization or carriage?  Colonization or carriage means that a person has the germ but is not sick from it.  These bacteria can be spread on the hands or when breathing or sneezing.    How is Staphylococcus aureus spread?  It is most often spread by close contact with a person or item that carries it.    What happens if my culture is positive for Staphylococcus aureus?  Your doctor/medical team will use this information to guide any antibiotic treatment which may be necessary.  Regardless of the culture results, we will clean the inside of your nose with a betadine swab just before you have your surgery.      Will I get an infection if I have Staphylococcus aureus in my nose or on my skin?  Anyone can get an infection with Staphylococcus aureus.  However, the best way to reduce your risk of infection is to follow the instructions provided to you for the use of your CHG soap and dental rinse.        Patient Information: Oral/Dental Rinse    What is oral/dental rinse?   It is a mouthwash. It is a way of cleaning  the mouth with a germ-killing solution before your surgery.  The solution contains chlorhexidine, commonly known as CHG.   It is used inside the mouth to kill a bacteria known as Staphylococcus aureus.  Let your doctor know if you are allergic to Chlorhexidine.    Why do I need to use CHG oral/dental rinse?  The CHG oral/dental rinse helps to kill a bacteria in your mouth known as Staphylococcus aureus.     This reduces the risk of infection at the surgical site.      Using your CHG oral/dental rinse  STEPS:  Use your CHG oral/dental rinse after you brush your teeth the night before (at bedtime) and the morning of your surgery.  Follow all directions on your prescription label.    Use the cap on the container to measure 15ml   Swish (gargle if you can) the mouthwash in your mouth for at least 30 seconds, (do not swallow) and spit out  After you use your CHG rinse, do not rinse your mouth with water, drink or eat.  Please refer to the prescription label for the appropriate time to resume oral intake      What side effects might I have using the CHG oral/dental rinse?  CHG rinse will stick to plaque on the teeth.  Brush and floss just before use.  Teeth brushing will help avoid staining of plaque during use.      Patient Information: Home Preoperative Antibacterial Shower      What is a home preoperative antibacterial shower?  This shower is a way of cleaning the skin with a germ-killing solution before surgery.  The solution contains chlorhexidine, commonly known as CHG.  CHG is a skin cleanser with germ-killing ability.  Let your doctor know if you are allergic to chlorhexidine.    Why do I need to take a preoperative antibacterial shower?  Skin is not sterile.  It is best to try to make your skin as free of germs as possible before surgery.  Proper cleansing with a germ-killing soap before surgery can lower the number of germs on your skin.  This helps to reduce the risk of infection at the surgical site.   Following the instructions listed below will help you prepare your skin for surgery.      How do I use the solution?  Steps:  Begin using your CHG MARCH 9  _______________________.    First, wash and rinse your hair using the CHG soap. Keep CHG soap away from ear canals and eyes.  Rinse completely, do not condition.  Hair extensions should be removed.  Wash your face with your normal soap and rinse.    Apply the CHG solution to a clean wet washcloth.  Turn the water off or move away from the water spray to avoid premature rinsing of the CHG soap as you are applying.   Firmly lather your entire body from the neck down.  Do not use on your face.  Pay special attention to the area(s) where your incision(s) will be located unless they are on your face.  Avoid scrubbing your skin too hard.  The important point is to have the CHG soap sit on your skin for 3 minutes.    When the 3 minutes are up, turn on the water and rinse the CHG solution off your body completely.   DO NOT wash with regular soap after you have used the CHG soap solution  Pat yourself dry with a clean, freshly-laundered towel.  DO NOT apply powders, deodorants, or lotions.  Dress in clean, freshly laundered nightclothes.    Be sure to sleep with clean, freshly laundered sheets.  Be aware that CHG will cause stains on fabrics; if you wash them with bleach after use.  Rinse your washcloth and other linens that have contact with CHG completely.  Use only non-chlorine detergents to launder the items used.   The morning of surgery is the fifth day.  Repeat the above steps and dress in clean comfortable clothing     Whom should I contact if I have any questions regarding the use of CHG soap?  Call the [facility] at [phone] if you have any questions.           PAT DISCHARGE INSTRUCTIONS    Please call the Same Day Surgery (SDS) Department of the hospital where your procedure will be  performed after 2:00 PM the day before your surgery. If you are scheduled on a Monday, or a Tuesday following a Monday holiday, you will need to call on the last business day prior to your surgery.    [id number] [place] [address] [phone]  Second Floor      Please let your surgeon know if:      You develop any open sores, shingles, burning or painful urination as these may increase your risk of an infection.   You no longer wish to have the surgery.   Any other personal circumstances change that may lead to the need to cancel or defer this surgery-such as being sick or getting admitted to any hospital within one week of your planned procedure.    Your contact details change, such as a change of address or phone number.    Starting now:     Please DO NOT drink alcohol or smoke for 24 hours before surgery. It is well known that quitting smoking can make a huge difference to your health and recovery from surgery. The longer you abstain from smoking, the better your chances of a healthy recovery. If you need help with quitting, call 1-800-QUIT-NOW to be connected to a trained counselor who will discuss the best methods to help you quit.     Before your surgery:    Please stop all supplements 7 days prior to surgery. Or as directed by your surgeon.   Please stop taking NSAID pain medicine such as Advil and Motrin 7 days before surgery.    If you develop any fever, cough, cold, rashes, cuts, scratches, scrapes, urinary symptoms or infection anywhere on your body (including teeth and gums) prior to surgery, please call your surgeon’s office as soon as possible. This may require treatment to reduce the chance of cancellation on the day of surgery.    The day before your surgery:   DIET- Please follow the diet instructions at the top of your packet.   Get a good night’s rest.  Use the special soap for bathing if you have been instructed to use one.    Scheduled  surgery times may change and you will be notified if this occurs - please check your personal voicemail for any updates.     On the morning of surgery:   Wear comfortable, loose fitting clothes which open in the front. Please do not wear moisturizers, creams, lotions, makeup or perfume.    Please bring with you to surgery:   Photo ID and insurance card   Current list of medicines and allergies   Pacemaker/ Defibrillator/Heart stent cards   CPAP machine and mask    Slings/ splints/ crutches   A copy of your complete advanced directive/DHPOA.    Please do NOT bring with you to surgery:   All jewelry and valuables should be left at home.   Prosthetic devices such as contact lenses, hearing aids, dentures, eyelash extensions, hairpins and body piercings must be removed prior to going in to the surgical suite.    After outpatient surgery:   A responsible adult MUST accompany you at the time of discharge and stay with you for 24 hours after your surgery. You may NOT drive yourself home after surgery.    Do not drive, operate machinery, make critical decisions or do activities that require co-ordination or balance until after a night’s sleep.   Do not drink alcoholic beverages for 24 hours.   Instructions for resuming your medications will be provided by your surgeon.    CALL YOUR DOCTOR AFTER SURGERY IF YOU HAVE:     Chills and/or a fever of 101° F or higher.    Redness, swelling, pus or drainage from your surgical wound or a bad smell from the wound.    Lightheadedness, fainting or confusion.    Persistent vomiting (throwing up) and are not able to eat or drink for 12 hours.    Three or more loose, watery bowel movements in 24 hours (diarrhea).   Difficulty or pain while urinating( after non-urological surgery)    Pain and swelling in your legs, especially if it is only on one side.    Difficulty breathing or are breathing faster than normal.    Any new concerning symptoms.

## 2025-02-26 NOTE — CPM/PAT H&P
"CPM/PAT Evaluation       Name: Guy Moreland (Guy Moreland \"Krishna\")  /Age: 1985/39 y.o.     In-Person       Chief Complaint: Right ankle pain     HPI  Patient is a 39 yr male with history  of complex regional pain  syndrome of his right ankle. Patient states current pain is a 7/10 from mid calve to heel that worsens when he walks. Patient had a spinal cord stimulator trial in December that he states was successful. Patient is scheduled for an insertion of spinal cord stimulator. Patient denies any changes since last PAT visit.     Past Medical History:   Diagnosis Date    Acute prostatitis 2019    Acute prostatitis with hematuria    Adverse effect of anesthesia     HX HA STATES MIGHT BE FROM PROPROFOL    Allergy status to unspecified drugs, medicaments and biological substances 2017    History of adverse drug reaction    Anxiety     Complex regional pain syndrome of right lower extremity     COVID-19 2022    COVID-19    Cutaneous abscess of left lower limb 2015    Abscess of knee, left    Depression     Dizziness and giddiness 2019    Dizzy spells    Eczema     Fatty liver     GERD (gastroesophageal reflux disease)     Hyperlipidemia     Hypertension     Left lower quadrant pain 2018    LLQ discomfort    Other intra-abdominal and pelvic swelling, mass and lump 2020    Nodule of groin    Other intra-abdominal and pelvic swelling, mass and lump 10/08/2020    Inguinal mass    Pain in right ankle and joints of right foot 2016    Chronic pain of right ankle    Pain in right lower leg 2020    Right calf pain    Panic attacks     Personal history of diseases of the blood and blood-forming organs and certain disorders involving the immune mechanism 2020    History of leukocytosis    Personal history of nicotine dependence 2016    History of nicotine dependence    Personal history of other (healed) physical injury and trauma 2017    History of " sprain    Personal history of other diseases of the musculoskeletal system and connective tissue 2016    History of low back pain    Personal history of other endocrine, nutritional and metabolic disease 2015    History of obesity    Personal history of urinary (tract) infections 2014    Personal history of urinary tract infection    Sialolithiasis     Salivary calculus    Unspecified hearing loss, left ear 2018    Hearing loss of left ear     Social History     Substance and Sexual Activity   Drug Use Yes    Types: Marijuana    Comment: Occassional marijuana use- LAST USED 25      Social History     Tobacco Use    Smoking status: Former     Current packs/day: 0.00     Average packs/day: 0.5 packs/day for 13.0 years (6.5 ttl pk-yrs)     Types: Cigarettes     Start date:      Quit date: 2018     Years since quittin.1    Smokeless tobacco: Never    Tobacco comments:     Quit 2019   Substance Use Topics    Alcohol use: Yes     Alcohol/week: 3.0 standard drinks of alcohol     Types: 3 Standard drinks or equivalent per week       Past Surgical History:   Procedure Laterality Date    ABDOMINAL SURGERY      ANKLE SURGERY  2023    Ankle Surgery; total of 8 right ankle surgeries    COLONOSCOPY  2017    Colonoscopy (Fiberoptic)    HERNIA REPAIR  2020    Hernia Repair    OTHER SURGICAL HISTORY  2017    Incision And Drainage Of Skin Abscess Buttocks    OTHER SURGICAL HISTORY      necrotic lipoma removed from abdomen    OTHER SURGICAL HISTORY      I&D of left Buttock Abscess (MRSA)    TOTAL ANKLE ARTHROPLASTY Right        Patient  has no history on file for sexual activity.    Family History   Problem Relation Name Age of Onset    Scleroderma Mother      Hyperlipidemia Father      Other (colon polyps, IBS, stomach polyps) Father      Raynaud syndrome Brother      Skin cancer Father's Brother      Parkinsonism Paternal Grandfather      Skin cancer Paternal Grandfather       Diabetes Other GrandFather        Allergies   Allergen Reactions    Sulfa (Sulfonamide Antibiotics) Unknown and Other     TOLD AS A CHILD NOT TO TAKE     Current Outpatient Medications   Medication Sig Dispense Refill    atorvastatin (Lipitor) 40 mg tablet TAKE ONE TABLET BY MOUTH EVERY DAY 90 tablet 3    busPIRone (Buspar) 15 mg tablet Take 1 tablet (15 mg) by mouth 2 times a day. (Patient not taking: Reported on 2/26/2025) 180 tablet 3    DULoxetine (Cymbalta) 30 mg DR capsule TAKE THREE (3) CAPSULES BY MOUTH ONCE DAILY. DO NOT CRUSH OR CHEW. 90 capsule 2    esomeprazole (NexIUM) 40 mg DR capsule Take 1 capsule (40 mg) by mouth once daily. 90 capsule 3    lisinopril 40 mg tablet Take 1 tablet (40 mg) by mouth once daily. 90 tablet 1    multivitamin with minerals tablet Take 1 tablet by mouth once daily.      QUEtiapine (SEROquel) 100 mg tablet TAKE ONE TABLET BY MOUTH AT BEDTIME 90 tablet 1     No current facility-administered medications for this visit.     Review of Systems   Constitutional: Negative.    HENT: Negative.     Eyes: Negative.    Respiratory: Negative.     Cardiovascular: Negative.    Gastrointestinal: Negative.    Endocrine: Negative.    Genitourinary: Negative.    Musculoskeletal:         Right ankle pain    Skin: Negative.    Allergic/Immunologic: Negative.    Neurological:  Positive for headaches (Headaches after endoscopy).   Hematological: Negative.    Psychiatric/Behavioral: Negative.         Physical Exam  Constitutional:       Appearance: Normal appearance. He is obese.   HENT:      Head: Normocephalic and atraumatic.      Nose: Nose normal.      Mouth/Throat:      Mouth: Mucous membranes are moist.      Pharynx: Oropharynx is clear.   Eyes:      Extraocular Movements: Extraocular movements intact.      Conjunctiva/sclera: Conjunctivae normal.      Pupils: Pupils are equal, round, and reactive to light.   Cardiovascular:      Rate and Rhythm: Normal rate and regular rhythm.      Heart  sounds: Normal heart sounds.   Pulmonary:      Effort: Pulmonary effort is normal.      Breath sounds: Normal breath sounds.   Abdominal:      General: Bowel sounds are normal.      Palpations: Abdomen is soft.   Musculoskeletal:         General: Tenderness (right ankle) present.      Cervical back: Normal range of motion and neck supple.   Skin:     General: Skin is warm and dry.   Neurological:      General: No focal deficit present.      Mental Status: He is alert and oriented to person, place, and time. Mental status is at baseline.   Psychiatric:         Mood and Affect: Mood normal.         Behavior: Behavior normal.         Thought Content: Thought content normal.         Judgment: Judgment normal.          PAT AIRWAY:   Airway:     Mallampati::  I    TM distance::  >3 FB    Neck ROM::  Full  normal        Testing/Diagnostic:     Patient Specialist/PCP:     Visit Vitals  /82   Pulse 97   Temp 37 °C (98.6 °F) (Temporal)   Ht 1.829 m (6')   Wt 115 kg (252 lb 8 oz)   SpO2 98%   BMI 34.25 kg/m²   Smoking Status Former   BSA 2.42 m²     ASA: 2  JOVANNI: 2.8%  RCRI: 0.4%    DASI Risk Score      Flowsheet Row Pre-Admission Testing from 2/26/2025 in [facility] Pre-Admission Testing from 12/13/2024 in [facility]   Can you take care of yourself (eat, dress, bathe, or use toilet)?  2.75 filed at 02/26/2025 1255 2.75 filed at 12/13/2024 1035   Can you walk indoors, such as around your house? 1.75 filed at 02/26/2025 1255 1.75 filed at 12/13/2024 1035   Can you walk a block or two on level ground?  2.75 filed at 02/26/2025 1255 2.75 filed at 12/13/2024 1035   Can you climb a flight of stairs or walk up a hill? 5.5 filed at 02/26/2025 1255 5.5 filed at 12/13/2024 1035   Can you run a short distance? 0 filed at 02/26/2025 1255 0 filed at 12/13/2024 1035   Can you do light work around the house like dusting or washing dishes? 2.7 filed at 02/26/2025 1255 2.7 filed at 12/13/2024 1035   Can  you do moderate work around the house like vacuuming, sweeping floors or carrying groceries? 3.5 filed at 02/26/2025 1255 3.5 filed at 12/13/2024 1035   Can you do heavy work around the house like scrubbing floors or lifting and moving heavy furniture?  0 filed at 02/26/2025 1255 0 filed at 12/13/2024 1035   Can you do yard work like raking leaves, weeding or pushing a mower? 0 filed at 02/26/2025 1255 0 filed at 12/13/2024 1035   Can you have sexual relations? 5.25 filed at 02/26/2025 1255 5.25 filed at 12/13/2024 1035   Can you participate in moderate recreational activities like golf, bowling, dancing, doubles tennis or throwing a baseball or football? 0 filed at 02/26/2025 1255 0 filed at 12/13/2024 1035   Can you participate in strenous sports like swimming, singles tennis, football, basketball, or skiing? 0 filed at 02/26/2025 1255 0 filed at 12/13/2024 1035   DASI SCORE 24.2 filed at 02/26/2025 1255 24.2 filed at 12/13/2024 1035   METS Score (Will be calculated only when all the questions are answered) 5.7 filed at 02/26/2025 1255 5.7 filed at 12/13/2024 1035          Caprini DVT Assessment    No data to display       Modified Frailty Index    No data to display       CHADS2 Stroke Risk         N/A 3 to 100%: High Risk   2 to < 3%: Medium Risk   0 to < 2%: Low Risk     Last Change: N/A          This score determines the patient's risk of having a stroke if the patient has atrial fibrillation.        This score is not applicable to this patient. Components are not calculated.          Revised Cardiac Risk Index      Flowsheet Row Pre-Admission Testing from 2/26/2025 in [facility] Pre-Admission Testing from 12/13/2024 in [facility]   High-Risk Surgery (Intraperitoneal, Intrathoracic,Suprainguinal vascular) 0 filed at 02/26/2025 1312 0 filed at 12/13/2024 1126   History of ischemic heart disease (History of MI, History of positive exercuse test, Current chest paint considered due  to myocardial ischemia, Use of nitrate therapy, ECG with pathological Q Waves) 0 filed at 02/26/2025 1312 0 filed at 12/13/2024 1126   History of congestive heart failure (pulmonary edemia, bilateral rales or S3 gallop, Paroxysmal nocturnal dyspnea, CXR showing pulmonary vascular redistribution) 0 filed at 02/26/2025 1312 0 filed at 12/13/2024 1126   History of cerebrovascular disease (Prior TIA or stroke) 0 filed at 02/26/2025 1312 0 filed at 12/13/2024 1126   Pre-operative insulin treatment 0 filed at 02/26/2025 1312 0 filed at 12/13/2024 1126   Pre-operative creatinine>2 mg/dl 0 filed at 02/26/2025 1312 0 filed at 12/13/2024 1126   Revised Cardiac Risk Calculator 0 filed at 02/26/2025 1312 0 filed at 12/13/2024 1126          Apfel Simplified Score    No data to display       Risk Analysis Index Results This Encounter    No data found in the last 10 encounters.       Stop Bang Score      Flowsheet Row Pre-Admission Testing from 2/26/2025 in [facility] Admission (Discharged) from 12/17/2024 in [facility] OR with Roxy Arrieta MD   Do you snore loudly? 1 filed at 02/26/2025 1255 1 filed at 12/17/2024 1238   Do you often feel tired or fatigued after your sleep? 1 filed at 02/26/2025 1255 1 filed at 12/17/2024 1238   Has anyone ever observed you stop breathing in your sleep? 0 filed at 02/26/2025 1255 0 filed at 12/17/2024 1238   Do you have or are you being treated for high blood pressure? 1 filed at 02/26/2025 1255 1 filed at 12/17/2024 1238   Recent BMI (Calculated) 34.2 filed at 02/26/2025 1255 34.6 filed at 12/17/2024 1238   Is BMI greater than 35 kg/m2? 0=No filed at 02/26/2025 1255 0=No filed at 12/17/2024 1238   Age older than 50 years old? 0=No filed at 02/26/2025 1255 0=No filed at 12/17/2024 1238   Is your neck circumference greater than 17 inches (Male) or 16 inches (Female)? 1 filed at 02/26/2025 1255 1 filed at 12/17/2024 1238   Gender - Male 1=Yes filed at 02/26/2025  1255 1=Yes filed at 12/17/2024 1238   STOP-BANG Total Score 5 filed at 02/26/2025 1255 5 filed at 12/17/2024 1238          Prodigy: High Risk  Total Score: 8              Prodigy Gender Score          ARISCAT Score for Postoperative Pulmonary Complications    No data to display       Elizabeth Perioperative Risk for Myocardial Infarction or Cardiac Arrest (TONY)    No data to display         Assessment and Plan:     Complex regional pain syndrome type 2 of right lower extremity: INSERTION, ELECTRODE LEAD, SPINAL CORD STIMULATOR   HTN:  Pt is taking Lisinopril  Hyperlipidemia: Pt is taking Atorvastatin  GERD: Pt is taking Nexium   BMI: 34.25  Occasional marijuana use  Anxiety/Depression: patient is taking Cymbalta and Seroquel    Recent CBC and BMP 12/13/2024  MRSA collected in [facility]

## 2025-02-28 LAB — STAPHYLOCOCCUS SPEC CULT: NORMAL

## 2025-03-13 ENCOUNTER — PHARMACY VISIT (OUTPATIENT)
Dept: PHARMACY | Facility: CLINIC | Age: 40
End: 2025-03-13
Payer: COMMERCIAL

## 2025-03-13 ENCOUNTER — ANESTHESIA EVENT (OUTPATIENT)
Dept: OPERATING ROOM | Facility: HOSPITAL | Age: 40
End: 2025-03-13
Payer: COMMERCIAL

## 2025-03-13 ENCOUNTER — ANESTHESIA (OUTPATIENT)
Dept: OPERATING ROOM | Facility: HOSPITAL | Age: 40
End: 2025-03-13
Payer: COMMERCIAL

## 2025-03-13 ENCOUNTER — APPOINTMENT (OUTPATIENT)
Dept: RADIOLOGY | Facility: HOSPITAL | Age: 40
End: 2025-03-13
Payer: COMMERCIAL

## 2025-03-13 ENCOUNTER — HOSPITAL ENCOUNTER (OUTPATIENT)
Facility: HOSPITAL | Age: 40
Setting detail: OUTPATIENT SURGERY
Discharge: HOME | End: 2025-03-13
Attending: ANESTHESIOLOGY | Admitting: ANESTHESIOLOGY
Payer: COMMERCIAL

## 2025-03-13 VITALS
RESPIRATION RATE: 20 BRPM | TEMPERATURE: 97.9 F | BODY MASS INDEX: 34.38 KG/M2 | DIASTOLIC BLOOD PRESSURE: 56 MMHG | OXYGEN SATURATION: 95 % | SYSTOLIC BLOOD PRESSURE: 94 MMHG | HEART RATE: 80 BPM | WEIGHT: 253.53 LBS

## 2025-03-13 DIAGNOSIS — G89.18 POSTOPERATIVE PAIN: ICD-10-CM

## 2025-03-13 DIAGNOSIS — G57.71 COMPLEX REGIONAL PAIN SYNDROME TYPE 2 OF RIGHT LOWER EXTREMITY: Primary | ICD-10-CM

## 2025-03-13 DIAGNOSIS — Z79.2 NEED FOR PROPHYLACTIC ANTIBIOTIC: ICD-10-CM

## 2025-03-13 PROCEDURE — 2500000004 HC RX 250 GENERAL PHARMACY W/ HCPCS (ALT 636 FOR OP/ED): Performed by: ANESTHESIOLOGY

## 2025-03-13 PROCEDURE — 2780000003 HC OR 278 NO HCPCS: Performed by: ANESTHESIOLOGY

## 2025-03-13 PROCEDURE — 2500000005 HC RX 250 GENERAL PHARMACY W/O HCPCS: Performed by: ANESTHESIOLOGIST ASSISTANT

## 2025-03-13 PROCEDURE — RXMED WILLOW AMBULATORY MEDICATION CHARGE

## 2025-03-13 PROCEDURE — 63685 INS/RPLC SPI NPG/RCVR POCKET: CPT | Performed by: ANESTHESIOLOGY

## 2025-03-13 PROCEDURE — C1778 LEAD, NEUROSTIMULATOR: HCPCS | Performed by: ANESTHESIOLOGY

## 2025-03-13 PROCEDURE — 2500000004 HC RX 250 GENERAL PHARMACY W/ HCPCS (ALT 636 FOR OP/ED): Performed by: ANESTHESIOLOGIST ASSISTANT

## 2025-03-13 PROCEDURE — 3600000004 HC OR TIME - INITIAL BASE CHARGE - PROCEDURE LEVEL FOUR: Performed by: ANESTHESIOLOGY

## 2025-03-13 PROCEDURE — 3700000001 HC GENERAL ANESTHESIA TIME - INITIAL BASE CHARGE: Performed by: ANESTHESIOLOGY

## 2025-03-13 PROCEDURE — 7100000009 HC PHASE TWO TIME - INITIAL BASE CHARGE: Performed by: ANESTHESIOLOGY

## 2025-03-13 PROCEDURE — 7100000002 HC RECOVERY ROOM TIME - EACH INCREMENTAL 1 MINUTE: Performed by: ANESTHESIOLOGY

## 2025-03-13 PROCEDURE — C1767 GENERATOR, NEURO NON-RECHARG: HCPCS | Performed by: ANESTHESIOLOGY

## 2025-03-13 PROCEDURE — 95972 ALYS CPLX SP/PN NPGT W/PRGRM: CPT | Performed by: ANESTHESIOLOGY

## 2025-03-13 PROCEDURE — 3700000002 HC GENERAL ANESTHESIA TIME - EACH INCREMENTAL 1 MINUTE: Performed by: ANESTHESIOLOGY

## 2025-03-13 PROCEDURE — 7100000010 HC PHASE TWO TIME - EACH INCREMENTAL 1 MINUTE: Performed by: ANESTHESIOLOGY

## 2025-03-13 PROCEDURE — 7100000001 HC RECOVERY ROOM TIME - INITIAL BASE CHARGE: Performed by: ANESTHESIOLOGY

## 2025-03-13 PROCEDURE — 2720000007 HC OR 272 NO HCPCS: Performed by: ANESTHESIOLOGY

## 2025-03-13 PROCEDURE — 63650 IMPLANT NEUROELECTRODES: CPT | Performed by: ANESTHESIOLOGY

## 2025-03-13 PROCEDURE — 3600000009 HC OR TIME - EACH INCREMENTAL 1 MINUTE - PROCEDURE LEVEL FOUR: Performed by: ANESTHESIOLOGY

## 2025-03-13 DEVICE — IMPLANTABLE DEVICE: Type: IMPLANTABLE DEVICE | Site: BACK | Status: FUNCTIONAL

## 2025-03-13 RX ORDER — MIDAZOLAM HYDROCHLORIDE 1 MG/ML
1 INJECTION, SOLUTION INTRAMUSCULAR; INTRAVENOUS ONCE AS NEEDED
Status: DISCONTINUED | OUTPATIENT
Start: 2025-03-13 | End: 2025-03-13 | Stop reason: HOSPADM

## 2025-03-13 RX ORDER — HYDRALAZINE HYDROCHLORIDE 20 MG/ML
5 INJECTION INTRAMUSCULAR; INTRAVENOUS EVERY 30 MIN PRN
Status: DISCONTINUED | OUTPATIENT
Start: 2025-03-13 | End: 2025-03-13 | Stop reason: HOSPADM

## 2025-03-13 RX ORDER — LIDOCAINE HYDROCHLORIDE AND EPINEPHRINE 10; 10 UG/ML; MG/ML
INJECTION, SOLUTION INFILTRATION; PERINEURAL AS NEEDED
Status: DISCONTINUED | OUTPATIENT
Start: 2025-03-13 | End: 2025-03-13 | Stop reason: HOSPADM

## 2025-03-13 RX ORDER — ONDANSETRON HYDROCHLORIDE 2 MG/ML
INJECTION, SOLUTION INTRAVENOUS AS NEEDED
Status: DISCONTINUED | OUTPATIENT
Start: 2025-03-13 | End: 2025-03-13

## 2025-03-13 RX ORDER — SODIUM CHLORIDE, SODIUM LACTATE, POTASSIUM CHLORIDE, CALCIUM CHLORIDE 600; 310; 30; 20 MG/100ML; MG/100ML; MG/100ML; MG/100ML
100 INJECTION, SOLUTION INTRAVENOUS CONTINUOUS
Status: DISCONTINUED | OUTPATIENT
Start: 2025-03-13 | End: 2025-03-13 | Stop reason: HOSPADM

## 2025-03-13 RX ORDER — BUPIVACAINE HYDROCHLORIDE 2.5 MG/ML
INJECTION, SOLUTION INFILTRATION; PERINEURAL AS NEEDED
Status: DISCONTINUED | OUTPATIENT
Start: 2025-03-13 | End: 2025-03-13 | Stop reason: HOSPADM

## 2025-03-13 RX ORDER — FENTANYL CITRATE 50 UG/ML
INJECTION, SOLUTION INTRAMUSCULAR; INTRAVENOUS AS NEEDED
Status: DISCONTINUED | OUTPATIENT
Start: 2025-03-13 | End: 2025-03-13

## 2025-03-13 RX ORDER — PHENYLEPHRINE HCL IN 0.9% NACL 1 MG/10 ML
SYRINGE (ML) INTRAVENOUS AS NEEDED
Status: DISCONTINUED | OUTPATIENT
Start: 2025-03-13 | End: 2025-03-13

## 2025-03-13 RX ORDER — MEPERIDINE HYDROCHLORIDE 25 MG/ML
12.5 INJECTION INTRAMUSCULAR; INTRAVENOUS; SUBCUTANEOUS EVERY 10 MIN PRN
Status: DISCONTINUED | OUTPATIENT
Start: 2025-03-13 | End: 2025-03-13 | Stop reason: HOSPADM

## 2025-03-13 RX ORDER — CEFAZOLIN SODIUM 2 G/100ML
2 INJECTION, SOLUTION INTRAVENOUS ONCE
Status: COMPLETED | OUTPATIENT
Start: 2025-03-13 | End: 2025-03-13

## 2025-03-13 RX ORDER — ONDANSETRON HYDROCHLORIDE 2 MG/ML
4 INJECTION, SOLUTION INTRAVENOUS ONCE AS NEEDED
Status: DISCONTINUED | OUTPATIENT
Start: 2025-03-13 | End: 2025-03-13 | Stop reason: HOSPADM

## 2025-03-13 RX ORDER — FENTANYL CITRATE 50 UG/ML
50 INJECTION, SOLUTION INTRAMUSCULAR; INTRAVENOUS EVERY 5 MIN PRN
Status: DISCONTINUED | OUTPATIENT
Start: 2025-03-13 | End: 2025-03-13 | Stop reason: HOSPADM

## 2025-03-13 RX ORDER — NORETHINDRONE AND ETHINYL ESTRADIOL 0.5-0.035
KIT ORAL AS NEEDED
Status: DISCONTINUED | OUTPATIENT
Start: 2025-03-13 | End: 2025-03-13

## 2025-03-13 RX ORDER — HYDROCODONE BITARTRATE AND ACETAMINOPHEN 5; 325 MG/1; MG/1
1 TABLET ORAL 4 TIMES DAILY PRN
Qty: 12 TABLET | Refills: 0 | Status: SHIPPED | OUTPATIENT
Start: 2025-03-13 | End: 2025-03-16

## 2025-03-13 RX ORDER — CEPHALEXIN 500 MG/1
500 CAPSULE ORAL 4 TIMES DAILY
Qty: 20 CAPSULE | Refills: 0 | Status: SHIPPED | OUTPATIENT
Start: 2025-03-13 | End: 2025-03-18

## 2025-03-13 RX ORDER — PROPOFOL 10 MG/ML
INJECTION, EMULSION INTRAVENOUS AS NEEDED
Status: DISCONTINUED | OUTPATIENT
Start: 2025-03-13 | End: 2025-03-13

## 2025-03-13 RX ORDER — SUCCINYLCHOLINE CHLORIDE 100 MG/5ML
SYRINGE (ML) INTRAVENOUS AS NEEDED
Status: DISCONTINUED | OUTPATIENT
Start: 2025-03-13 | End: 2025-03-13

## 2025-03-13 RX ORDER — ALBUTEROL SULFATE 0.83 MG/ML
2.5 SOLUTION RESPIRATORY (INHALATION) ONCE AS NEEDED
Status: DISCONTINUED | OUTPATIENT
Start: 2025-03-13 | End: 2025-03-13 | Stop reason: HOSPADM

## 2025-03-13 RX ORDER — LIDOCAINE HYDROCHLORIDE 10 MG/ML
0.1 INJECTION, SOLUTION INFILTRATION; PERINEURAL ONCE
Status: DISCONTINUED | OUTPATIENT
Start: 2025-03-13 | End: 2025-03-13 | Stop reason: HOSPADM

## 2025-03-13 RX ORDER — MIDAZOLAM HYDROCHLORIDE 1 MG/ML
INJECTION, SOLUTION INTRAMUSCULAR; INTRAVENOUS AS NEEDED
Status: DISCONTINUED | OUTPATIENT
Start: 2025-03-13 | End: 2025-03-13

## 2025-03-13 RX ADMIN — Medication 100 MCG: at 13:03

## 2025-03-13 RX ADMIN — Medication 200 MCG: at 13:10

## 2025-03-13 RX ADMIN — Medication 200 MCG: at 14:04

## 2025-03-13 RX ADMIN — EPHEDRINE SULFATE 5 MG: 50 INJECTION, SOLUTION INTRAVENOUS at 13:17

## 2025-03-13 RX ADMIN — Medication 200 MCG: at 14:48

## 2025-03-13 RX ADMIN — Medication 200 MCG: at 14:31

## 2025-03-13 RX ADMIN — SODIUM CHLORIDE, POTASSIUM CHLORIDE, SODIUM LACTATE AND CALCIUM CHLORIDE: 600; 310; 30; 20 INJECTION, SOLUTION INTRAVENOUS at 14:22

## 2025-03-13 RX ADMIN — FENTANYL CITRATE 50 MCG: 50 INJECTION, SOLUTION INTRAMUSCULAR; INTRAVENOUS at 13:33

## 2025-03-13 RX ADMIN — PROPOFOL 500 MG: 10 INJECTION, EMULSION INTRAVENOUS at 13:21

## 2025-03-13 RX ADMIN — Medication 200 MCG: at 15:17

## 2025-03-13 RX ADMIN — MIDAZOLAM 2 MG: 1 INJECTION INTRAMUSCULAR; INTRAVENOUS at 12:39

## 2025-03-13 RX ADMIN — PROPOFOL 100 MG: 10 INJECTION, EMULSION INTRAVENOUS at 13:19

## 2025-03-13 RX ADMIN — Medication 200 MCG: at 14:11

## 2025-03-13 RX ADMIN — FENTANYL CITRATE 50 MCG: 50 INJECTION, SOLUTION INTRAMUSCULAR; INTRAVENOUS at 14:48

## 2025-03-13 RX ADMIN — Medication 200 MCG: at 13:59

## 2025-03-13 RX ADMIN — EPHEDRINE SULFATE 10 MG: 50 INJECTION, SOLUTION INTRAVENOUS at 13:38

## 2025-03-13 RX ADMIN — PROPOFOL 200 MG: 10 INJECTION, EMULSION INTRAVENOUS at 14:59

## 2025-03-13 RX ADMIN — Medication 200 MCG: at 14:44

## 2025-03-13 RX ADMIN — PROPOFOL 200 MG: 10 INJECTION, EMULSION INTRAVENOUS at 14:38

## 2025-03-13 RX ADMIN — Medication 200 MCG: at 14:20

## 2025-03-13 RX ADMIN — PROPOFOL 200 MG: 10 INJECTION, EMULSION INTRAVENOUS at 12:39

## 2025-03-13 RX ADMIN — FENTANYL CITRATE 25 MCG: 50 INJECTION, SOLUTION INTRAMUSCULAR; INTRAVENOUS at 14:45

## 2025-03-13 RX ADMIN — Medication 300 MCG: at 14:40

## 2025-03-13 RX ADMIN — Medication 200 MCG: at 14:09

## 2025-03-13 RX ADMIN — Medication 140 MG: at 12:39

## 2025-03-13 RX ADMIN — Medication 200 MCG: at 14:25

## 2025-03-13 RX ADMIN — FENTANYL CITRATE 25 MCG: 50 INJECTION, SOLUTION INTRAMUSCULAR; INTRAVENOUS at 14:46

## 2025-03-13 RX ADMIN — FENTANYL CITRATE 50 MCG: 50 INJECTION, SOLUTION INTRAMUSCULAR; INTRAVENOUS at 13:29

## 2025-03-13 RX ADMIN — CEFAZOLIN SODIUM 2 G: 2 INJECTION, SOLUTION INTRAVENOUS at 12:51

## 2025-03-13 RX ADMIN — Medication 200 MCG: at 15:10

## 2025-03-13 RX ADMIN — EPHEDRINE SULFATE 5 MG: 50 INJECTION, SOLUTION INTRAVENOUS at 13:29

## 2025-03-13 RX ADMIN — PROPOFOL 100 MG: 10 INJECTION, EMULSION INTRAVENOUS at 12:50

## 2025-03-13 RX ADMIN — Medication 200 MCG: at 14:55

## 2025-03-13 RX ADMIN — FENTANYL CITRATE 25 MCG: 50 INJECTION, SOLUTION INTRAMUSCULAR; INTRAVENOUS at 12:50

## 2025-03-13 RX ADMIN — FENTANYL CITRATE 75 MCG: 50 INJECTION, SOLUTION INTRAMUSCULAR; INTRAVENOUS at 12:39

## 2025-03-13 RX ADMIN — Medication 100 MCG: at 13:31

## 2025-03-13 RX ADMIN — Medication 200 MCG: at 13:48

## 2025-03-13 RX ADMIN — Medication 200 MCG: at 13:49

## 2025-03-13 RX ADMIN — ONDANSETRON HYDROCHLORIDE 4 MG: 2 INJECTION INTRAMUSCULAR; INTRAVENOUS at 15:01

## 2025-03-13 RX ADMIN — SODIUM CHLORIDE, POTASSIUM CHLORIDE, SODIUM LACTATE AND CALCIUM CHLORIDE: 600; 310; 30; 20 INJECTION, SOLUTION INTRAVENOUS at 12:36

## 2025-03-13 RX ADMIN — DEXAMETHASONE SODIUM PHOSPHATE 4 MG: 4 INJECTION, SOLUTION INTRAMUSCULAR; INTRAVENOUS at 13:23

## 2025-03-13 RX ADMIN — Medication 200 MCG: at 13:55

## 2025-03-13 RX ADMIN — PROPOFOL 200 MG: 10 INJECTION, EMULSION INTRAVENOUS at 14:17

## 2025-03-13 RX ADMIN — EPHEDRINE SULFATE 15 MG: 50 INJECTION, SOLUTION INTRAVENOUS at 13:40

## 2025-03-13 RX ADMIN — Medication 100 MCG: at 13:40

## 2025-03-13 RX ADMIN — Medication 200 MCG: at 15:04

## 2025-03-13 RX ADMIN — PROPOFOL 120 MG: 10 INJECTION, EMULSION INTRAVENOUS at 15:28

## 2025-03-13 RX ADMIN — Medication 200 MCG: at 15:28

## 2025-03-13 RX ADMIN — Medication 200 MCG: at 14:27

## 2025-03-13 RX ADMIN — EPHEDRINE SULFATE 5 MG: 50 INJECTION, SOLUTION INTRAVENOUS at 13:13

## 2025-03-13 RX ADMIN — EPHEDRINE SULFATE 5 MG: 50 INJECTION, SOLUTION INTRAVENOUS at 13:31

## 2025-03-13 RX ADMIN — EPHEDRINE SULFATE 5 MG: 50 INJECTION, SOLUTION INTRAVENOUS at 13:48

## 2025-03-13 SDOH — HEALTH STABILITY: MENTAL HEALTH: CURRENT SMOKER: 0

## 2025-03-13 ASSESSMENT — ENCOUNTER SYMPTOMS
CARDIOVASCULAR NEGATIVE: 1
HEMATOLOGIC/LYMPHATIC NEGATIVE: 1
CONSTITUTIONAL NEGATIVE: 1
JOINT SWELLING: 1
ENDOCRINE NEGATIVE: 1
WEAKNESS: 1
ARTHRALGIAS: 1
PSYCHIATRIC NEGATIVE: 1
GASTROINTESTINAL NEGATIVE: 1
NUMBNESS: 1
RESPIRATORY NEGATIVE: 1
EYES NEGATIVE: 1

## 2025-03-13 ASSESSMENT — PAIN - FUNCTIONAL ASSESSMENT
PAIN_FUNCTIONAL_ASSESSMENT: 0-10
PAIN_FUNCTIONAL_ASSESSMENT: FLACC (FACE, LEGS, ACTIVITY, CRY, CONSOLABILITY)
PAIN_FUNCTIONAL_ASSESSMENT: 0-10

## 2025-03-13 ASSESSMENT — PAIN DESCRIPTION - DESCRIPTORS
DESCRIPTORS: ACHING;NAGGING
DESCRIPTORS: ACHING;NAGGING

## 2025-03-13 ASSESSMENT — PAIN SCALES - GENERAL
PAINLEVEL_OUTOF10: 6
PAINLEVEL_OUTOF10: 6
PAINLEVEL_OUTOF10: 0 - NO PAIN
PAIN_LEVEL: 2
PAINLEVEL_OUTOF10: 4
PAINLEVEL_OUTOF10: 4

## 2025-03-13 ASSESSMENT — COLUMBIA-SUICIDE SEVERITY RATING SCALE - C-SSRS
2. HAVE YOU ACTUALLY HAD ANY THOUGHTS OF KILLING YOURSELF?: NO
1. IN THE PAST MONTH, HAVE YOU WISHED YOU WERE DEAD OR WISHED YOU COULD GO TO SLEEP AND NOT WAKE UP?: NO
6. HAVE YOU EVER DONE ANYTHING, STARTED TO DO ANYTHING, OR PREPARED TO DO ANYTHING TO END YOUR LIFE?: NO

## 2025-03-13 NOTE — ANESTHESIA PREPROCEDURE EVALUATION
"Patient: Guy Moreland \"Krishna\"    Procedure Information       Date/Time: 03/13/25 1215    Procedure: Spinal Cord Stimulator Implant with Neuromonitoring - C-ARM, LATOYA ELIZABETH, ZOILA RALPH, NEURODIAGNOSTICS ([employer] Conf# [id number])    Location: [facility] / [facility]    Surgeons: Roxy Arrieta MD            Relevant Problems   Cardiac   (+) Essential hypertension   (+) Hyperlipidemia      Neuro   (+) Anxiety and depression   (+) Complex regional pain syndrome type 1 of right lower extremity   (+) Complex regional pain syndrome type 2 of right lower extremity      GI   (+) GERD (gastroesophageal reflux disease)   (+) IBS (irritable bowel syndrome)      Liver   (+) Fatty liver      Endocrine   (+) Obesity      Musculoskeletal   (+) Complex regional pain syndrome type 1 of right lower extremity   (+) Complex regional pain syndrome type 2 of right lower extremity      ID   (+) Viral URI      Skin   (+) Eczema       Clinical information reviewed:   Tobacco  Allergies  Meds  Problems  Med Hx  Surg Hx   Fam Hx  Soc   Hx        NPO Detail:  NPO/Void Status  Date of Last Liquid: 03/12/25  Time of Last Liquid: 2100  Date of Last Solid: 03/12/25  Time of Last Solid: 2100  Time of Last Void: 1000         Physical Exam    Airway  Mallampati: II  TM distance: >3 FB  Neck ROM: full     Cardiovascular - normal exam     Dental - normal exam     Pulmonary - normal exam     Abdominal - normal exam             Anesthesia Plan    History of general anesthesia?: yes  History of complications of general anesthesia?: no    ASA 2     general     The patient is not a current smoker.  Patient was not previously instructed to abstain from smoking on day of procedure.  Patient did not smoke on day of procedure.  Education provided regarding risk of obstructive sleep apnea.  intravenous induction   Postoperative administration of opioids is intended.  Trial extubation is planned.  Anesthetic plan and risks discussed with " patient.  Use of blood products discussed with patient who consented to blood products.    Plan discussed with CAA, attending and CRNA.

## 2025-03-13 NOTE — ANESTHESIA PROCEDURE NOTES
Airway  Date/Time: 3/13/2025 12:45 PM  Urgency: elective    Airway not difficult    Staffing  Performed: attending   Authorized by: Yash Tam MD    Performed by: NOLAN Wilkes  Patient location during procedure: OR    Indications and Patient Condition  Indications for airway management: anesthesia  Spontaneous ventilation: present  Sedation level: deep  Preoxygenated: yes  Patient position: sniffing  Mask difficulty assessment: 1 - vent by mask    Final Airway Details  Final airway type: endotracheal airway      Successful airway: ETT  Cuffed: yes   Successful intubation technique: direct laryngoscopy  Facilitating devices/methods: intubating stylet  Endotracheal tube insertion site: oral  Blade: Sonya  Blade size: #3  ETT size (mm): 8.0  Cormack-Lehane Classification: grade I - full view of glottis  Placement verified by: chest auscultation and capnometry   Measured from: lips  ETT to lips (cm): 23  Number of attempts at approach: 1  Ventilation between attempts: none  Number of other approaches attempted: 0    Additional Comments  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 Lips and teeth in pre-anesthetic condition.         I'm glad that the fluoxetine is working well - will continue on same dose - 40 mg daily    Also - no change to Concerta dose -72 mg daily    For the excessive sweating - I think this is probably a side effect from the Fluoxetine  We could try changing you to a different medicaiton at some point if this continues but I'm hesitant to do that since Fluoxetine is otherwise working well    Let's try:  Drysol (extra strength antipersperant)    Apply to underarms at bedtime each night to begin wish  Wash underarms in the morning    Once the sweating improves, you can decrease frequency to every few nights and continue using as needed      IF all is going well, please return in six months

## 2025-03-13 NOTE — ANESTHESIA POSTPROCEDURE EVALUATION
"Patient: Guy Moreland \"Krishna\"    Procedure Summary       Date: 03/13/25 Room / Location: [facility] / [facility]    Anesthesia Start: 1236 Anesthesia Stop: 1557    Procedure: Spinal Cord Stimulator Implant with Neuromonitoring Diagnosis:       Complex regional pain syndrome type 2 of right lower extremity      (Complex regional pain syndrome type 2 of right lower extremity [G57.71])    Surgeons: Roxy Arrieta MD Responsible Provider: Yash Tam MD    Anesthesia Type: general ASA Status: 2            Anesthesia Type: general    Vitals Value Taken Time   /76   Pulse 76   Temp 36.3 °C (97.3 °F) (Temporal)   Resp 18   Wt 115 kg (253 lb 8.5 oz)   BMI 34.38 kg/m²       Anesthesia Post Evaluation    Patient location during evaluation: PACU  Patient participation: complete - patient participated  Level of consciousness: sleepy but conscious  Pain score: 2  Pain management: adequate  Multimodal analgesia pain management approach  Airway patency: patent  Cardiovascular status: acceptable  Respiratory status: acceptable  Hydration status: acceptable  Postoperative Nausea and Vomiting: none        There were no known notable events for this encounter.    "

## 2025-03-13 NOTE — H&P
PAIN MANAGEMENT H&P    Date of Service: 3/13/2025  SUBJECTIVE    CHIEF COMPLAINT: RLE pain    HISTORY OF PRESENT ILLNESS    Guy Moreland is a 39 y.o. male with PMH HTN, GERD, obesity, IBS, MDD/CORKY, insomnia  who presents for DRG implant    Pain and overall medical condition unchanged from previous visit. Pt is appropriately NPO. Pt denies new-onset numbness, weakness, bowel/bladder incontinence.  Pt denies recent infection/abx use, allergy to Latex/iodine/contrast. Patient is currently taking the following blood thinner(s): N/A    REVIEW OF SYSTEMS  Review of Systems   Constitutional: Negative.    HENT: Negative.     Eyes: Negative.    Respiratory: Negative.     Cardiovascular: Negative.    Gastrointestinal: Negative.    Endocrine: Negative.    Musculoskeletal:  Positive for arthralgias and joint swelling.   Skin: Negative.    Neurological:  Positive for weakness and numbness.   Hematological: Negative.    Psychiatric/Behavioral: Negative.         PAST MEDICAL HISTORY  Past Medical History:   Diagnosis Date    Acute prostatitis 03/03/2019    Acute prostatitis with hematuria    Adverse effect of anesthesia     HX HA STATES MIGHT BE FROM PROPROFOL    Allergy status to unspecified drugs, medicaments and biological substances 06/08/2017    History of adverse drug reaction    Anxiety     Complex regional pain syndrome of right lower extremity     COVID-19 01/04/2022    COVID-19    Cutaneous abscess of left lower limb 08/11/2015    Abscess of knee, left    Depression     Dizziness and giddiness 03/03/2019    Dizzy spells    Eczema     Fatty liver     GERD (gastroesophageal reflux disease)     Hyperlipidemia     Hypertension     Left lower quadrant pain 06/14/2018    LLQ discomfort    Other intra-abdominal and pelvic swelling, mass and lump 07/13/2020    Nodule of groin    Other intra-abdominal and pelvic swelling, mass and lump 10/08/2020    Inguinal mass    Pain in right ankle and joints of right foot 06/03/2016     Chronic pain of right ankle    Pain in right lower leg 03/20/2020    Right calf pain    Panic attacks     Personal history of diseases of the blood and blood-forming organs and certain disorders involving the immune mechanism 06/08/2020    History of leukocytosis    Personal history of nicotine dependence 02/01/2016    History of nicotine dependence    Personal history of other (healed) physical injury and trauma 06/25/2017    History of sprain    Personal history of other diseases of the musculoskeletal system and connective tissue 06/03/2016    History of low back pain    Personal history of other endocrine, nutritional and metabolic disease 03/03/2015    History of obesity    Personal history of urinary (tract) infections 08/06/2014    Personal history of urinary tract infection    Sialolithiasis     Salivary calculus    Unspecified hearing loss, left ear 02/05/2018    Hearing loss of left ear     Past Surgical History:   Procedure Laterality Date    ABDOMINAL SURGERY      ANKLE SURGERY  01/04/2023    Ankle Surgery; total of 8 right ankle surgeries    COLONOSCOPY  06/05/2017    Colonoscopy (Fiberoptic)    HERNIA REPAIR  09/21/2020    Hernia Repair    OTHER SURGICAL HISTORY  06/05/2017    Incision And Drainage Of Skin Abscess Buttocks    OTHER SURGICAL HISTORY      necrotic lipoma removed from abdomen    OTHER SURGICAL HISTORY      I&D of left Buttock Abscess (MRSA)    TOTAL ANKLE ARTHROPLASTY Right      Family History   Problem Relation Name Age of Onset    Scleroderma Mother      Hyperlipidemia Father      Other (colon polyps, IBS, stomach polyps) Father      Raynaud syndrome Brother      Skin cancer Father's Brother      Parkinsonism Paternal Grandfather      Skin cancer Paternal Grandfather      Diabetes Other GrandFather        CURRENT MEDICATIONS  Current Facility-Administered Medications   Medication Dose Route Frequency Provider Last Rate Last Admin    ceFAZolin (Ancef) 2 g in dextrose (iso)  mL  2 g  "intravenous Once Roxy Arrieta MD           ALLERGIES AND DRUG REACTIONS  Allergies   Allergen Reactions    Sulfa (Sulfonamide Antibiotics) Unknown and Other     TOLD AS A CHILD NOT TO TAKE          OBJECTIVE  Visit Vitals  /76   Pulse 76   Temp 36.3 °C (97.3 °F) (Temporal)   Resp 18   Wt 115 kg (253 lb 8.5 oz)   SpO2 98%   BMI 34.38 kg/m²   Smoking Status Former   BSA 2.42 m²     General: Lying comfortably in bed, NAD  Head: NCAT  Eyes: Sclera/conjunctiva clear, EOMI, PERRL  Nose/mouth: MMM  CV: Good distal pulses  Lungs: Good/equal chest excursion  Abdomen: Soft, ND  Ext: No cyanosis/edema  MSK: Able to move extremities  Neuro: AAOx3, grossly normal  Psych: affect nl      REVIEW OF LABORATORY DATA  I have reviewed the following lab results:  No results found for: \"WBC\", \"RBC\", \"HGB\", \"HCT\", \"MCV\", \"MCH\", \"MCHC\", \"RDW\", \"PLT\", \"MPV\"  No results found for: \"NA\", \"K\", \"CO2\", \"BUN\", \"CALCIUM\"  No results found for: \"PROTIME\", \"PTT\", \"INR\", \"FIBRINOGEN\"      REVIEW OF RADIOLOGY DATA  I have reviewed the following:  Radiology Studies           CT ankle R 7/10/24:  No acute fracture or dislocation. Postsurgical changes total ankle  arthroplasty. Hardware appears intact, well positioned and well  aligned. There is no periprosthetic fracture or lucency. Solid fusion  across subtalar joint with ghost tracts related to prior arthrodesis.  Unchanged bone-on-bone articulation along the inferior aspect of the  calcaneocuboid joint. Mid and hindfoot joints are otherwise  maintained. No sizeable joint effusion.      SOFT TISSUES:  Muscle bulk is maintained. No subcutaneous fluid collection.      IMPRESSION:  Unremarkable total ankle arthroplasty.       ASSESSMENT & PLAN  Guy Moreland is a 39 y.o. male with PMH HTN, GERD, obesity, IBS, MDD/CORKY, insomnia who presents for DRG implant      1) RLE CRPS 2  -Chronic R ankle pain since fall off roof 2006 s/p 8 surgeries including total ankle replacement 2022 s/p 3 revisions, " last of which was 5/1/24.   -Persistent R ankle pain with sympathetic signs/sx meeting [place] criteria for CRPS. Pain severe and inhibiting function, ADLs, sleep; impairing QoL  -Refractive to >10 y conservative tx including Tylenol, NSAIDs, duloxetine, >6 w PT, marijuana, gabapentin, m relaxants, CSI.  -CT R ankle 7/10/24: Unremarkable total ankle arthroplasty.  -Cont duloxetine 90 mg QD  -DRG work-up:  --EMG BLE 1/10/2020: mild R L5, S1 radiculopathy; mild BL S1 conduction defect; R sural sensory nerve injury  --Psych eval scheduled 10/20  --CT T/L-spine 10/7/24: unremarkable  ---R L5, S1 DRG trial 12/17/24: 100% relief. Lost stimulation after twisting at work against instructions, but regained with inc'd amplitude. XR shown small retrograde migration of R L5 lead   --R L5, S1 DRG stimulator implant today to target pain generator and minimize risk/likelihood of chronic opioid use and/or surgery        Discussed procedure risks/benefits in detail with patient. Pt meets medical necessity for procedure due to failure of conservative measures. Reviewed procedural risks including bleeding, infection, nerve damage, paralysis. Also reviewed mitigating factors such as screening for infection/blood thinner use, sterile precautions, and image-guidance when applicable. All questions answered. Pt/guardian expressed understanding and choose to proceed            Roxy Arrieta MD  Anesthesiologist & Interventional Pain Physician   [facility] [place]  O: [phone]  F: [phone]  11:28 AM  03/13/25

## 2025-03-13 NOTE — OP NOTE
"DORSAL ROOT GANGTLION (DRG) Stimulator Implant with Neuromonitoring Operative Note     Date: 3/13/2025  OR Location: [facility]    Name: Guy Moreland \"Krishna\", : 1985, Age: 39 y.o., MRN: [id number], Sex: male    Diagnosis  Pre-op Diagnosis      * Complex regional pain syndrome type 2 of right lower extremity [G57.71] Post-op Diagnosis     * Complex regional pain syndrome type 2 of right lower extremity [G57.71]     Procedures  Spinal Cord Stimulator Implant with Neuromonitoring  66377 - OK PRQ IMPLTJ NSTIM ELECTRODE ARRAY EPIDURAL    Spinal Cord Stimulator Implant with Neuromonitoring  90005 - OK PRQ IMPLTJ NSTIM ELECTRODE ARRAY EPIDURAL    OK INSJ/RPLCMT SPINAL NPG/RCVR POCKET CRTJ&CONNJ [14612]  OK ELEC EDUAR IMPLT NPGT CPLX SP/PN PRGRMG [85486]  Surgeons      * Roxy Arrieta - Primary    Resident/Fellow/Other Assistant:  Surgeons and Role:  * No surgeons found with a matching role *    Staff:   Circulator: Addy Stark Person: Yari Roblero Circulator: Misael Roblero Scrub: Orquidea    Anesthesia Staff: Anesthesiologist: Yash Tam MD  C-AA: NOLAN Wilkes    Procedure Summary  Anesthesia: General  ASA: II  Estimated Blood Loss: 5 mL  Intra-op Medications: Administrations occurring from 25 1628 to 25 1628:  * No intraprocedure medications in log *           Anesthesia Record               Intraprocedure I/O Totals          Intake    Dexmedetomidine 0.00 mL    The total shown is the total volume documented since Anesthesia Start was filed.    LR bolus 1000.00 mL    Total Intake 1000 mL       Output    Est. Blood Loss 5 mL    Total Output 5 mL       Net    Net Volume 995 mL          Specimen: No specimens collected              Drains and/or Catheters: * None in log *    Tourniquet Times:         Implants:  Implants       Type Name Action Serial No.      Neuro Interventional Implant LEAD, AXIUM SLIMTIP, 50CM - [id number] - [id number] Implanted [id number]     Neuro Interventional " "Implant LEAD, AXIUM SLIMTIP, 50CM - [id number] Implanted      Implant GENERATOR, PROCLAIM DRG, W/PAT CONTROLLER - [id number] Implanted                 Indications: Guy Moreland \"Krishna\" is an 39 y.o. male who is having surgery for Complex regional pain syndrome type 2 of right lower extremity [G57.71].         DORSAL ROOT GANGLION IMPLANT PROCEDURE NOTE   2 leads and implanted pulse generator (IPG)    PREPROCEDURAL DIAGNOSES: RLE CRPS 2  POSTPROCEDURAL  DIAGNOSES: RLE CRPS 2    NAME OF PROCEDURE:           1.    Percutaneous placement of dorsal root ganglion (DRG) stimulator electrode array for implantation of DRG stimulator.   2.    Implantation of programmable Internal Pulse Generator (IPG)     INDICATION FOR PROCEDURE:     Chronic RLE pain refractory to medical and interventional therapies, highly successful DRG stimulation trial for ~1 week, cleared by psychologist with behavioral evaluation     ANESTHESIA: GETA   Neuromonitoring: SSEPs/EMGs  FLUIDS: 1.5 L LR   BLOOD LOSS: 5 mL     IMPLANTS:   Abbott Slim tip 4-contact lead x2  Proclaim DRG IPG     COMPLICATIONS: None     LOCATION: [facility]    INFORMED CONSENT: The potential benefits, procedural risks and adverse effects of spinal cord stimulator implantation were discussed with the patient. Patient was told that severe complications such as nerve damage or spinal cord injury resulting in lower extremity weakness numbness, spasticity and that the potential benefit of this procedure would be decreased pain.  After reviewing the procedure with the patient, informed consent to proceed with the placement of DRG stimulator was obtained.       DESCRIPTION OF PROCEDURE:       The patient was given 2 g cefazolin (preoperative antibiotics) approximately 30 minutes prior to incision.  The patient was placed prone on the operating room table and pressure points were padded. The patient's back was then prepped with chlorhexidine prep and and draped in a sterile fashion.  " "Anesthesia personnel were present to provide general anesthesia.     Under direct fluoroscopic guidance, L5-S1 and S1-S2 levels identified in AP and lateral views.  Local anesthesia with 10 cc of 1% lidocaine with epinephrine was injected subcutaneously at the left S1 level for right L5-S1 neuroforaminal access. A vertical incision approximately 2 cm in length was made using a #15-blade.  Using Metzenbaum scissors and blunt dissection, the tissues were dissected down to  fascia, hemostasis was maintained with Bovie.      A 14 g intro needle was advanced via the left PM approach to the L5-S1 interspace. Using JACE saline + air technique, the epidural space was entered at the midline. Using LAT (first) and then AP pulsed fluoro, electrode #1 was advanced utilizing sheath into right L5-S1 neuroforamen. Electrode carefully threaded into neuroforamen to adequate position under live fluoro without significant resistance. Sheath withdrawn as retention loops were made in epidural space. Lateral view confirmed lead position in dorsal neuroforamen.     Right S1 neuroforamen identified under AP. A 3.5 \" 22 G Quinke needle was utilized to verify S1 foramen in lateral view. Dissection technique repeated at right S1 level.   This was removed and after administration of 1% lido, 2 cm skin incision made with #15 blade. 14 G needle advanced under lateral view until in posterior canal. Sheath advanced until anterior to sacral plate along right S1 DRG. Electrode threaded at this level to adequate position as confirmed in AP/lateral views.     The stylets were removed from the electrodes and the electrodes were connected to a temporart power source with the assistance of the device representative in the operating room.  Impedences were evaluated and within expected limits.  This successfully r/o motor stim. EMG and SSEPs remained normal.     Sheaths, needles, stylets removed under live fluoro while verifying maintenance of adequate lead " placements.  Anchors were passed over the electrodes and secured to the electrodes using 2.0 Ethibond.  There was no lead migration during this portion of the procedure.      Next, the pocket for the IPG was made in the patient's right flank region after the skin was anesthetized with 10 cc of 1% lidocaine with epinephrine.   A #15 blade was used to make a 4 cm horizontal incision here.  Using Metzenbaum scissors and blunt dissection a subcutaneous pocket, about 1-2 cm below the skin surface was made to accommodate the IPG snugly.  Hemostasis was maintained with a Bovie here. A gentamicin-soaked gauze was placed in the pocket.  Next, a tunneling filippo was passed through the subcutaneous tissues from the DRG insertion sites to the flank pocket site, and the electrodes were passed through this tract without complications.  The extension electrodes were then inserted into the battery device and attached securely with screwdriver.  The device representative confirmed adequate connections and impedances prior to closing the IPG pocket.  Tension loops were made at the back incision and IPG pocket site with the electrode and extension respectively. The IPG was not anchored in the pocket.    The gentamicin-soaked gauze was removed from the IPG pocket.   Next, both wounds were flushed with copious amounts of gentamicin saline and hemostasis was confirmed again.   The entire scrub team then changed outer gloves. The flank pocket fascia was closed with 2-0 interupted vicryl sutures and a running 2-0 vicryl.  The DRG insertion sites were each closed with 2-0 interupted vicryl sutures and a running 2-0 vicryl.  Final skin closure at both incision sites was made with a 4-0 monocryl running subcuticular stitch.  10 c 0.25% bupivacaine administered at incision sites for post-op pain. Mastisol and Steri-Strips were then placed over the incision sites.   The incisions were dressed with gauze and Tegaderm.    Task Performed by MADDIE First  Assist and/or fellow was necessary to assist on this case due to the nature of the case and difficulty.     The patient was transferred to the recovery room whereupon motor and sensory testing of the lower extremities showed no new neurologic deficits.  The patient reported no new pain patterns radiating into the lower extremtities. The device representative, programmed the stimulator with patient feedback for optimal stimulation patterns.  The patient reported good paresthesia coverage.  After appropriate recovery time, the patient was discharged home with a 5-day supply of prophylactic antibiotic.  The patient was instructed to take tylenol or ibuprofen, as needed at home for incisional and was instructed to call the clinic or on-call doctor with any further questions or concerns about the stimulator or the incisions.  The patient is to follow up in clinic in 10-14 days for a wound check and is to refrain from showering for 2 days and from soaking for 2 weeks.    DISPOSITION:                 Home     Roxy Arrieta MD  Anesthesiologist & Interventional Pain Physician   [facility] [place]  O: [phone]  F: [phone]  3:25 PM  03/13/25

## 2025-03-13 NOTE — PERIOPERATIVE NURSING NOTE
1645-Arrived to [facility] 17 via cart with RN. Alert, denies nausea. Low back surgical pain tolerable 4/10. Ice to incisions, 3 dressings with 4x4 and tegaderm dry and intact. Set up with drink and snack, call light within reach, family at bedside. Discharge medications to bedside.  1715- Tolerating PO. VSS. IV discontinued. Reviewed discharge instructions, questions answered. Up to bathroom with steady gait. Discharged home.

## 2025-03-13 NOTE — DISCHARGE INSTRUCTIONS
DISCHARGE INSTRUCTIONS FOR DORSAL ROOT GANGLION (DRG) STIMULATION IMPLANT     You received a DRG stimulator today.     -    Okay to reinforce dressing if needed.   -    Do not shower for 2 days. Do not soak/hot tub/jacuzzi/pool for 2 weeks to prevent infection   -    Do not bend at the waist, twist and lift your arms above your shoulders for 6 weeks. This will reduce the possibility of lead movement which may result in loss of stimulation.   -    For post procedure pain, take over-the-counter Tylenol 500 mg and ibuprofen 800 mg together every 8 hours for the next 5 days. .   -    Take your antibiotics as prescribed for 5 days as written to prevent infection  -    Do not drive or operate heavy machinery while stimulator is turned on.   -    Follow up with Dr. Arrieta in clinic in 2 weeks.     It is recommended that you relax and limit your activity for the remainder of the day unless you have been told otherwise by your pain physician. You should not drive a car, operate machinery, or make important legal decisions unless otherwise directed by your pain physician.      Some discomfort, bruising or slight swelling may occur at the injection site. This is not abnormal if it occurs.  If needed you may:        o    Take over the counter medication such as Tylenol or Motrin.       o    Apply an ice pack for 30 minutes, 2 to 3 times a day for the first 24 hours.     You do not need to discontinue non-aspirin-containing pain medications prior to an injection (examples: Celebrex, tramadol, hydrocodone and acetaminophen).      Call the Pain Medicine Practice at [phone] between 8am-4pm Monday - Friday if you are experiencing the following:       o    Headache that does not go away with medicine, is worse when sitting or standing up, and is greatly relieved upon lying down.       o    Severe pain worse than or different than your baseline pain.       o    Chills or fever (101º F or greater).       o    Drainage or signs  of infection at the injection site     Go directly to the Emergency Department if you are experiencing the following and received an epidural or spinal injection:       o    Abrupt weakness or progressive weakness in your legs that starts after you leave the clinic.       o    Abrupt severe or worseningnumbness in your legs.       o    Inability to urinate after the injection or loss of bowel or bladder control without the urge to defecate or urinate.     If you have a clinical question that cannot wait until your next appointment, please call [phone] between 8am-4pm Monday - Friday or send a [employer] message. We do our best to return all non-emergency messages within 24 hours, Monday - Friday. A nurse or physician will return your message.      If you need to cancel an appointment please call the scheduling staff at [phone] during normal business hours or leave a message at least 24 hours in advance.

## 2025-03-17 ENCOUNTER — TELEPHONE (OUTPATIENT)
Dept: PAIN MEDICINE | Facility: CLINIC | Age: 40
End: 2025-03-17
Payer: COMMERCIAL

## 2025-03-17 DIAGNOSIS — G89.18 POSTOPERATIVE PAIN: ICD-10-CM

## 2025-03-17 NOTE — TELEPHONE ENCOUNTER
I called Krishna today and he is pretty uncomfortable.  He has not spoken to his rep since Saturday and not taking anything for pain.  He has not taken off his drsg.  He denies a fever  or nausea or vomiting .  I strongly suggested he contact his rep today and told him I would follow up with him again tomorrow.

## 2025-03-18 RX ORDER — HYDROCODONE BITARTRATE AND ACETAMINOPHEN 5; 325 MG/1; MG/1
1 TABLET ORAL 4 TIMES DAILY PRN
Qty: 12 TABLET | Refills: 0 | Status: SHIPPED | OUTPATIENT
Start: 2025-03-18 | End: 2025-03-21

## 2025-03-18 NOTE — TELEPHONE ENCOUNTER
Called patient who reports slow-to-improve soreness over IPG site and LB incisions. Pain better than over the weekend, but still bothersome and inhibiting sleep. Claims incisions/dressings look good without worsening swelling, erythema, drainage, or pus. Moderate warmth/tenderness at IPG site is improving. Felt Norco was helping over the weekend. Of note, pt feels he had left big toe numbness and calf tenderness last weekend. Calf tenderness has resolved and left big toe numbness improving. Advised pt to let me know should sx not continue to improve when he comes in on Friday. Pt expressed understanding. All questions answered.      Roxy Arrieta MD  Anesthesiologist & Interventional Pain Physician   [facility] [place]  O: [phone]  F: [phone]  10:53 AM  03/18/25

## 2025-03-21 ENCOUNTER — OFFICE VISIT (OUTPATIENT)
Dept: PAIN MEDICINE | Facility: CLINIC | Age: 40
End: 2025-03-21
Payer: COMMERCIAL

## 2025-03-21 VITALS
SYSTOLIC BLOOD PRESSURE: 112 MMHG | WEIGHT: 253 LBS | HEART RATE: 72 BPM | OXYGEN SATURATION: 98 % | BODY MASS INDEX: 34.27 KG/M2 | DIASTOLIC BLOOD PRESSURE: 71 MMHG | HEIGHT: 72 IN

## 2025-03-21 DIAGNOSIS — Z96.82 PRESENCE OF NEUROSTIMULATOR: Primary | ICD-10-CM

## 2025-03-21 PROCEDURE — 95972 ALYS CPLX SP/PN NPGT W/PRGRM: CPT | Performed by: ANESTHESIOLOGY

## 2025-03-21 PROCEDURE — 3008F BODY MASS INDEX DOCD: CPT | Performed by: ANESTHESIOLOGY

## 2025-03-21 PROCEDURE — 1036F TOBACCO NON-USER: CPT | Performed by: ANESTHESIOLOGY

## 2025-03-21 PROCEDURE — 99214 OFFICE O/P EST MOD 30 MIN: CPT | Performed by: ANESTHESIOLOGY

## 2025-03-21 PROCEDURE — 3078F DIAST BP <80 MM HG: CPT | Performed by: ANESTHESIOLOGY

## 2025-03-21 PROCEDURE — 3074F SYST BP LT 130 MM HG: CPT | Performed by: ANESTHESIOLOGY

## 2025-03-21 PROCEDURE — 99214 OFFICE O/P EST MOD 30 MIN: CPT | Mod: 25,24 | Performed by: ANESTHESIOLOGY

## 2025-03-21 ASSESSMENT — ENCOUNTER SYMPTOMS
WEAKNESS: 1
JOINT SWELLING: 1
GASTROINTESTINAL NEGATIVE: 1
CONSTITUTIONAL NEGATIVE: 1
HEMATOLOGIC/LYMPHATIC NEGATIVE: 1
EYES NEGATIVE: 1
NUMBNESS: 1
ARTHRALGIAS: 1
CARDIOVASCULAR NEGATIVE: 1
PSYCHIATRIC NEGATIVE: 1
ENDOCRINE NEGATIVE: 1
RESPIRATORY NEGATIVE: 1

## 2025-03-21 ASSESSMENT — PAIN DESCRIPTION - DESCRIPTORS: DESCRIPTORS: ACHING

## 2025-03-21 ASSESSMENT — PAIN - FUNCTIONAL ASSESSMENT: PAIN_FUNCTIONAL_ASSESSMENT: 0-10

## 2025-03-21 ASSESSMENT — PATIENT HEALTH QUESTIONNAIRE - PHQ9
1. LITTLE INTEREST OR PLEASURE IN DOING THINGS: NOT AT ALL
2. FEELING DOWN, DEPRESSED OR HOPELESS: NOT AT ALL
SUM OF ALL RESPONSES TO PHQ9 QUESTIONS 1 AND 2: 0

## 2025-03-21 ASSESSMENT — PAIN SCALES - GENERAL
PAINLEVEL_OUTOF10: 4
PAINLEVEL_OUTOF10: 4

## 2025-03-21 NOTE — PROGRESS NOTES
PAIN MANAGEMENT FOLLOW-UP OFFICE NOTE    Date of Service: 3/21/2025    SUBJECTIVE    CHIEF COMPLAINT: R ankle pain.     HISTORY OF PRESENT ILLNESS    Guy Moreland is a 39 y.o. male pt of Dr Chino with PMH HTN, GERD, obesity, IBS, MDD/CORKY, insomnia who presents for F/U     On 3/13, pt underwent R L5, S1 DRG trial with 100% relief of his RLE neuropathic pain. Notes R foot soreness/ambulatory pain 50% better. Overall, he feels 70% better. Soreness improved.     Pt denies new-onset bowel/bladder incontinence.  Pt denies recent infection, allergy to Latex/iodine/contrast. Patient is currently taking the following blood thinner(s): N/A    Procedure log:  -R L5, S1 DRG implant 3/13/25: 100% neuropathic pain relief, 50% pressure pain relief. 70% overall relief, hyperalgesia/allodynia resolved  -R L5, S1 DRG trial 12/17/24: 100% relief    REVIEW OF SYSTEMS  Review of Systems   Constitutional: Negative.    HENT: Negative.     Eyes: Negative.    Respiratory: Negative.     Cardiovascular: Negative.    Gastrointestinal: Negative.    Endocrine: Negative.    Musculoskeletal:  Positive for arthralgias and joint swelling.   Skin: Negative.    Neurological:  Positive for weakness and numbness.   Hematological: Negative.    Psychiatric/Behavioral: Negative.         PAST MEDICAL HISTORY  Past Medical History:   Diagnosis Date    Acute prostatitis 03/03/2019    Acute prostatitis with hematuria    Adverse effect of anesthesia     HX HA STATES MIGHT BE FROM PROPROFOL    Allergy status to unspecified drugs, medicaments and biological substances 06/08/2017    History of adverse drug reaction    Anxiety     Complex regional pain syndrome of right lower extremity     COVID-19 01/04/2022    COVID-19    Cutaneous abscess of left lower limb 08/11/2015    Abscess of knee, left    Depression     Dizziness and giddiness 03/03/2019    Dizzy spells    Eczema     Fatty liver     GERD (gastroesophageal reflux disease)     Hyperlipidemia      Hypertension     Left lower quadrant pain 06/14/2018    LLQ discomfort    Other intra-abdominal and pelvic swelling, mass and lump 07/13/2020    Nodule of groin    Other intra-abdominal and pelvic swelling, mass and lump 10/08/2020    Inguinal mass    Pain in right ankle and joints of right foot 06/03/2016    Chronic pain of right ankle    Pain in right lower leg 03/20/2020    Right calf pain    Panic attacks     Personal history of diseases of the blood and blood-forming organs and certain disorders involving the immune mechanism 06/08/2020    History of leukocytosis    Personal history of nicotine dependence 02/01/2016    History of nicotine dependence    Personal history of other (healed) physical injury and trauma 06/25/2017    History of sprain    Personal history of other diseases of the musculoskeletal system and connective tissue 06/03/2016    History of low back pain    Personal history of other endocrine, nutritional and metabolic disease 03/03/2015    History of obesity    Personal history of urinary (tract) infections 08/06/2014    Personal history of urinary tract infection    Sialolithiasis     Salivary calculus    Unspecified hearing loss, left ear 02/05/2018    Hearing loss of left ear     Past Surgical History:   Procedure Laterality Date    ABDOMINAL SURGERY      ANKLE SURGERY  01/04/2023    Ankle Surgery; total of 8 right ankle surgeries    COLONOSCOPY  06/05/2017    Colonoscopy (Fiberoptic)    HERNIA REPAIR  09/21/2020    Hernia Repair    OTHER SURGICAL HISTORY  06/05/2017    Incision And Drainage Of Skin Abscess Buttocks    OTHER SURGICAL HISTORY      necrotic lipoma removed from abdomen    OTHER SURGICAL HISTORY      I&D of left Buttock Abscess (MRSA)    TOTAL ANKLE ARTHROPLASTY Right      Family History   Problem Relation Name Age of Onset    Scleroderma Mother      Hyperlipidemia Father      Other (colon polyps, IBS, stomach polyps) Father      Raynaud syndrome Brother      Skin cancer  Father's Brother      Parkinsonism Paternal Grandfather      Skin cancer Paternal Grandfather      Diabetes Other GrandFather        CURRENT MEDICATIONS  Current Outpatient Medications   Medication Sig Dispense Refill    atorvastatin (Lipitor) 40 mg tablet TAKE ONE TABLET BY MOUTH EVERY DAY 90 tablet 3    busPIRone (Buspar) 15 mg tablet Take 1 tablet (15 mg) by mouth 2 times a day. 180 tablet 3    DULoxetine (Cymbalta) 30 mg DR capsule TAKE THREE (3) CAPSULES BY MOUTH ONCE DAILY. DO NOT CRUSH OR CHEW. 90 capsule 2    esomeprazole (NexIUM) 40 mg DR capsule Take 1 capsule (40 mg) by mouth once daily. 90 capsule 3    HYDROcodone-acetaminophen (Norco) 5-325 mg tablet Take 1 tablet by mouth 4 times a day as needed for severe pain (7 - 10) for up to 3 days. 12 tablet 0    multivitamin with minerals tablet Take 1 tablet by mouth once daily.      QUEtiapine (SEROquel) 100 mg tablet TAKE ONE TABLET BY MOUTH AT BEDTIME 90 tablet 1    lisinopril 40 mg tablet Take 1 tablet (40 mg) by mouth once daily. 90 tablet 1     No current facility-administered medications for this visit.       ALLERGIES AND DRUG REACTIONS  Allergies   Allergen Reactions    Sulfa (Sulfonamide Antibiotics) Unknown and Other     TOLD AS A CHILD NOT TO TAKE          OBJECTIVE  Visit Vitals  /71   Pulse 72   Ht 1.829 m (6')   Wt 115 kg (253 lb)   SpO2 98%   BMI 34.31 kg/m²   Smoking Status Former   BSA 2.42 m²       Last Recorded Pain Score (if available):        Pain Score:   4 ([facility])       Physical Exam  General: Sitting in chair, NAD  Head: NCAT  Eyes: Sclera/conjunctiva clear, EOMI, PERRL  Nose/mouth: MMM  CV: Good distal pulses  Lungs: Good/equal chest excursion  Abdomen: Soft, ND  Ext: No cyanosis/edema  MSK: RLE: red/dusky dorsal foot compared to L assoc with moderate non-pitting edema and surgical changes. Resolved ankle TTP, dorsiflexion structurally limited. Resolved allodynia medial ankle.      Neuro: AAOx3, CN grossly nl  Dermatome  sensation to light touch  LEFT L1 (lower pelvis/upper thigh): WNL    RIGHT L1: WNL      LEFT L2 (upper thigh): WNL       RIGHT: L2:WNL      LEFT L3 (medial knee): WNL       RIGHT L3: WNL      LEFT L4 (superior medial malleolus): WNL       RIGHT L4: WNL      LEFT L5 (dorsal foot): WNL       RIGHT L5: WNL      LEFT S1 (lateral foot): WNL     RIGHT S1: WNL      LEFT S2 (popliteal fossa): WNL    RIGHT S2: WNL        Motor strength  LEFT L2 (hip flexion): 5/5   RIGHT L2: 5/5  LEFT L3 (knee extension): 5/5     RIGHT L3: 5/5  LEFT L4 (dorsiflexion): 5/5     RIGHT L4: 5/5  LEFT L5 (EHL extension): 5/5     RIGHT L5: 5/5  LEFT S1 (plantarflexion): 5/5     RIGHT S1: 5/5  LEFT S2 (knee flexion): 5/5      RIGHT S2: 5/5        Psych: affect nl  Skin: no rash/lesions. DRG wound dressings removed to reveal adequate steri-strip placement. Wounds dressed with fresh Stratasorb dressing    REVIEW OF LABORATORY DATA  I have reviewed the following lab results:  WBC   Date Value Ref Range Status   12/13/2024 7.9 4.4 - 11.3 x10*3/uL Final     RBC   Date Value Ref Range Status   12/13/2024 5.25 4.50 - 5.90 x10*6/uL Final     Hemoglobin   Date Value Ref Range Status   12/13/2024 15.2 13.5 - 17.5 g/dL Final     Hematocrit   Date Value Ref Range Status   12/13/2024 45.8 41.0 - 52.0 % Final     MCV   Date Value Ref Range Status   12/13/2024 87 80 - 100 fL Final     MCH   Date Value Ref Range Status   12/13/2024 29.0 26.0 - 34.0 pg Final     MCHC   Date Value Ref Range Status   12/13/2024 33.2 32.0 - 36.0 g/dL Final     RDW   Date Value Ref Range Status   12/13/2024 13.2 11.5 - 14.5 % Final     Platelets   Date Value Ref Range Status   12/13/2024 394 150 - 450 x10*3/uL Final     MPV   Date Value Ref Range Status   10/07/2022 9.2 7.0 - 12.6 CU Final     Sodium   Date Value Ref Range Status   12/13/2024 139 136 - 145 mmol/L Final     Potassium   Date Value Ref Range Status   12/13/2024 4.2 3.5 - 5.3 mmol/L Final     Bicarbonate   Date Value Ref  Range Status   12/13/2024 29 21 - 32 mmol/L Final     Urea Nitrogen   Date Value Ref Range Status   12/13/2024 11 6 - 23 mg/dL Final     Calcium   Date Value Ref Range Status   12/13/2024 10.3 8.6 - 10.3 mg/dL Final     Protime   Date Value Ref Range Status   06/26/2021 12.0 10.1 - 13.3 sec Final     INR   Date Value Ref Range Status   06/26/2021 1.0 0.9 - 1.1 Final         REVIEW OF RADIOLOGY   I have reviewed the following:  Radiology Studies           CT ankle R 7/10/24:  No acute fracture or dislocation. Postsurgical changes total ankle  arthroplasty. Hardware appears intact, well positioned and well  aligned. There is no periprosthetic fracture or lucency. Solid fusion  across subtalar joint with ghost tracts related to prior arthrodesis.  Unchanged bone-on-bone articulation along the inferior aspect of the  calcaneocuboid joint. Mid and hindfoot joints are otherwise  maintained. No sizeable joint effusion.      SOFT TISSUES:  Muscle bulk is maintained. No subcutaneous fluid collection.      IMPRESSION:  Unremarkable total ankle arthroplasty.       ASSESSMENT & PLAN  Guy Moreland is a 39 y.o. male pt of Dr Chino with PMH HTN, GERD, obesity, IBS, MDD/CORKY, insomnia who presents for F/U R ankle pain likely 2/2 CRPS    1) RLE CRPS 2  -Chronic R ankle pain since fall off roof 2006 s/p 8 surgeries including total ankle replacement 2022 s/p 3 revisions, last of which was 5/1/24.   -Persistent R ankle pain with sympathetic signs/sx meeting [place] criteria for CRPS. Pain severe and inhibiting function, ADLs, sleep; impairing QoL  -Refractive to >10 y conservative tx including Tylenol, NSAIDs, duloxetine, >6 w PT, marijuana, gabapentin, m relaxants, CSI.  -CT R ankle 7/10/24: Unremarkable total ankle arthroplasty.  -Cont duloxetine 90 mg QD  -DRG work-up:  --EMG BLE 1/10/2020: mild R L5, S1 radiculopathy; mild BL S1 conduction defect; R sural sensory nerve injury  -R L5, S1 DRG implant 3/13/25: 100% neuropathic  pain relief, 50% pressure pain relief. 70% overall relief, hyperalgesia/allodynia resolved.   --Wounds re-dressed. Maintain movement restrictions additional 5 w  --Complex reprogramming provided for device optimization  --F/U 4-6 w          Today's visit involved continuation of chronic pain care. In the context of the complexity of this patient's chronic pain diagnosis, long-term expectations and care planning discussed. Adequate time taken to ensure patient understanding and answer questions. Imaging studies ordered are placed do elucidate the patient's diagnosis, but also to evaluate the patient's candidacy for procedural and surgical interventions. The risks and benefits of these potential interventions are detailed as above.                Roxy Arrieta MD  Anesthesiologist & Interventional Pain Physician   [facility] [place]  O: [phone]  F: [phone]  12:08 PM  03/21/25

## 2025-03-28 DIAGNOSIS — G90.521 COMPLEX REGIONAL PAIN SYNDROME TYPE 1 OF RIGHT LOWER EXTREMITY: ICD-10-CM

## 2025-03-31 RX ORDER — DULOXETIN HYDROCHLORIDE 30 MG/1
CAPSULE, DELAYED RELEASE ORAL
Qty: 90 CAPSULE | Refills: 0 | Status: SHIPPED | OUTPATIENT
Start: 2025-03-31

## 2025-04-11 DIAGNOSIS — I10 ESSENTIAL HYPERTENSION: ICD-10-CM

## 2025-04-14 DIAGNOSIS — I10 ESSENTIAL HYPERTENSION: ICD-10-CM

## 2025-04-14 RX ORDER — LISINOPRIL 40 MG/1
40 TABLET ORAL DAILY
Qty: 90 TABLET | Refills: 3 | Status: SHIPPED | OUTPATIENT
Start: 2025-04-14 | End: 2025-04-14 | Stop reason: SDUPTHER

## 2025-04-14 RX ORDER — LISINOPRIL 40 MG/1
40 TABLET ORAL DAILY
Qty: 90 TABLET | Refills: 3 | Status: SHIPPED | OUTPATIENT
Start: 2025-04-14

## 2025-05-02 ENCOUNTER — OFFICE VISIT (OUTPATIENT)
Dept: PAIN MEDICINE | Facility: CLINIC | Age: 40
End: 2025-05-02
Payer: COMMERCIAL

## 2025-05-02 VITALS — OXYGEN SATURATION: 99 % | SYSTOLIC BLOOD PRESSURE: 122 MMHG | HEART RATE: 99 BPM | DIASTOLIC BLOOD PRESSURE: 80 MMHG

## 2025-05-02 DIAGNOSIS — G57.71 COMPLEX REGIONAL PAIN SYNDROME TYPE 2 OF RIGHT LOWER EXTREMITY: Primary | ICD-10-CM

## 2025-05-02 DIAGNOSIS — M79.604 RIGHT LEG PAIN: ICD-10-CM

## 2025-05-02 DIAGNOSIS — Z96.82 PRESENCE OF NEUROSTIMULATOR: ICD-10-CM

## 2025-05-02 PROCEDURE — 3079F DIAST BP 80-89 MM HG: CPT | Performed by: ANESTHESIOLOGY

## 2025-05-02 PROCEDURE — 3074F SYST BP LT 130 MM HG: CPT | Performed by: ANESTHESIOLOGY

## 2025-05-02 PROCEDURE — 95972 ALYS CPLX SP/PN NPGT W/PRGRM: CPT | Performed by: ANESTHESIOLOGY

## 2025-05-02 PROCEDURE — 99213 OFFICE O/P EST LOW 20 MIN: CPT | Mod: 25 | Performed by: ANESTHESIOLOGY

## 2025-05-02 PROCEDURE — 99213 OFFICE O/P EST LOW 20 MIN: CPT | Performed by: ANESTHESIOLOGY

## 2025-05-02 ASSESSMENT — ENCOUNTER SYMPTOMS
JOINT SWELLING: 1
HEMATOLOGIC/LYMPHATIC NEGATIVE: 1
ARTHRALGIAS: 1
RESPIRATORY NEGATIVE: 1
WEAKNESS: 1
GASTROINTESTINAL NEGATIVE: 1
LOSS OF SENSATION IN FEET: 0
CONSTITUTIONAL NEGATIVE: 1
CARDIOVASCULAR NEGATIVE: 1
OCCASIONAL FEELINGS OF UNSTEADINESS: 0
DEPRESSION: 0
NUMBNESS: 1
EYES NEGATIVE: 1
ENDOCRINE NEGATIVE: 1
PSYCHIATRIC NEGATIVE: 1

## 2025-05-02 ASSESSMENT — PAIN - FUNCTIONAL ASSESSMENT: PAIN_FUNCTIONAL_ASSESSMENT: 0-10

## 2025-05-02 ASSESSMENT — PAIN SCALES - GENERAL
PAINLEVEL_OUTOF10: 2
PAINLEVEL_OUTOF10: 2

## 2025-05-02 ASSESSMENT — PAIN DESCRIPTION - DESCRIPTORS: DESCRIPTORS: DULL

## 2025-05-02 NOTE — PROGRESS NOTES
PAIN MANAGEMENT FOLLOW-UP OFFICE NOTE    Date of Service: 5/2/2025    SUBJECTIVE    CHIEF COMPLAINT: R ankle pain.     HISTORY OF PRESENT ILLNESS    Guy Moreland is a 39 y.o. male pt of Dr Chino with PMH HTN, GERD, obesity, IBS, MDD/CORKY, insomnia who presents for F/U     Since his last visit, pt feels 80% overall relief RLE with DRG implant. He is very pleased. Notes suture came out of lateral edge of R IPG pocket, but he denies any drainage or pus at wound. Notes mild itchiness. Following up with Dr Chino re R tarsal tunnel syndrome    Pt denies new-onset bowel/bladder incontinence.  Pt denies recent infection, allergy to Latex/iodine/contrast. Patient is currently taking the following blood thinner(s): N/A    Procedure log:  -R L5, S1 DRG implant 3/13/25: 100% neuropathic pain relief, 50% pressure pain relief. 80% overall relief, hyperalgesia/allodynia resolved  -R L5, S1 DRG trial 12/17/24: 100% relief    REVIEW OF SYSTEMS  Review of Systems   Constitutional: Negative.    HENT: Negative.     Eyes: Negative.    Respiratory: Negative.     Cardiovascular: Negative.    Gastrointestinal: Negative.    Endocrine: Negative.    Musculoskeletal:  Positive for arthralgias and joint swelling.   Skin: Negative.    Neurological:  Positive for weakness and numbness.   Hematological: Negative.    Psychiatric/Behavioral: Negative.         PAST MEDICAL HISTORY  Past Medical History:   Diagnosis Date    Acute prostatitis 03/03/2019    Acute prostatitis with hematuria    Adverse effect of anesthesia     HX HA STATES MIGHT BE FROM PROPROFOL    Allergy status to unspecified drugs, medicaments and biological substances 06/08/2017    History of adverse drug reaction    Anxiety     Complex regional pain syndrome of right lower extremity     COVID-19 01/04/2022    COVID-19    Cutaneous abscess of left lower limb 08/11/2015    Abscess of knee, left    Depression     Dizziness and giddiness 03/03/2019    Dizzy spells    Eczema      Fatty liver     GERD (gastroesophageal reflux disease)     Hyperlipidemia     Hypertension     Left lower quadrant pain 06/14/2018    LLQ discomfort    Other intra-abdominal and pelvic swelling, mass and lump 07/13/2020    Nodule of groin    Other intra-abdominal and pelvic swelling, mass and lump 10/08/2020    Inguinal mass    Pain in right ankle and joints of right foot 06/03/2016    Chronic pain of right ankle    Pain in right lower leg 03/20/2020    Right calf pain    Panic attacks     Personal history of diseases of the blood and blood-forming organs and certain disorders involving the immune mechanism 06/08/2020    History of leukocytosis    Personal history of nicotine dependence 02/01/2016    History of nicotine dependence    Personal history of other (healed) physical injury and trauma 06/25/2017    History of sprain    Personal history of other diseases of the musculoskeletal system and connective tissue 06/03/2016    History of low back pain    Personal history of other endocrine, nutritional and metabolic disease 03/03/2015    History of obesity    Personal history of urinary (tract) infections 08/06/2014    Personal history of urinary tract infection    Sialolithiasis     Salivary calculus    Unspecified hearing loss, left ear 02/05/2018    Hearing loss of left ear     Past Surgical History:   Procedure Laterality Date    ABDOMINAL SURGERY      ANKLE SURGERY  01/04/2023    Ankle Surgery; total of 8 right ankle surgeries    COLONOSCOPY  06/05/2017    Colonoscopy (Fiberoptic)    HERNIA REPAIR  09/21/2020    Hernia Repair    OTHER SURGICAL HISTORY  06/05/2017    Incision And Drainage Of Skin Abscess Buttocks    OTHER SURGICAL HISTORY      necrotic lipoma removed from abdomen    OTHER SURGICAL HISTORY      I&D of left Buttock Abscess (MRSA)    TOTAL ANKLE ARTHROPLASTY Right      Family History   Problem Relation Name Age of Onset    Scleroderma Mother      Hyperlipidemia Father      Other (colon polyps,  IBS, stomach polyps) Father      Raynaud syndrome Brother      Skin cancer Father's Brother      Parkinsonism Paternal Grandfather      Skin cancer Paternal Grandfather      Diabetes Other GrandFather        CURRENT MEDICATIONS  Current Outpatient Medications   Medication Sig Dispense Refill    atorvastatin (Lipitor) 40 mg tablet TAKE ONE TABLET BY MOUTH EVERY DAY 90 tablet 3    busPIRone (Buspar) 15 mg tablet Take 1 tablet (15 mg) by mouth 2 times a day. 180 tablet 3    DULoxetine (Cymbalta) 30 mg DR capsule TAKE THREE (3) CAPSULES BY MOUTH ONCE DAILY. DO NOT CRUSH OR CHEW. 90 capsule 0    esomeprazole (NexIUM) 40 mg DR capsule Take 1 capsule (40 mg) by mouth once daily. 90 capsule 3    lisinopril 40 mg tablet Take 1 tablet (40 mg) by mouth once daily. 90 tablet 3    multivitamin with minerals tablet Take 1 tablet by mouth once daily.      QUEtiapine (SEROquel) 100 mg tablet TAKE ONE TABLET BY MOUTH AT BEDTIME 90 tablet 1     No current facility-administered medications for this visit.       ALLERGIES AND DRUG REACTIONS  Allergies   Allergen Reactions    Sulfa (Sulfonamide Antibiotics) Unknown and Other     TOLD AS A CHILD NOT TO TAKE          OBJECTIVE  Visit Vitals  /80   Pulse 99   SpO2 99%   Smoking Status Former       Last Recorded Pain Score (if available):        Pain Score:   2       Physical Exam  General: Sitting in chair, NAD  Head: NCAT  Eyes: Sclera/conjunctiva clear, EOMI, PERRL  Nose/mouth: MMM  CV: Good distal pulses  Lungs: Good/equal chest excursion  Abdomen: Soft, ND  Ext: No cyanosis/edema  MSK: RLE: red/dusky dorsal foot compared to L assoc with moderate non-pitting edema and surgical changes. Resolved ankle TTP, dorsiflexion structurally limited. Resolved allodynia medial ankle.      Neuro: AAOx3, CN grossly nl      Psych: affect nl  Skin: no rash/lesions. DRG access scars well-healed. Lateral edge of R flank IPG scar healing without induration, redness, pus, tenderness. Pt shows picture  of suture knot that came out yesterday     REVIEW OF LABORATORY DATA  I have reviewed the following lab results:  WBC   Date Value Ref Range Status   12/13/2024 7.9 4.4 - 11.3 x10*3/uL Final     RBC   Date Value Ref Range Status   12/13/2024 5.25 4.50 - 5.90 x10*6/uL Final     Hemoglobin   Date Value Ref Range Status   12/13/2024 15.2 13.5 - 17.5 g/dL Final     Hematocrit   Date Value Ref Range Status   12/13/2024 45.8 41.0 - 52.0 % Final     MCV   Date Value Ref Range Status   12/13/2024 87 80 - 100 fL Final     MCH   Date Value Ref Range Status   12/13/2024 29.0 26.0 - 34.0 pg Final     MCHC   Date Value Ref Range Status   12/13/2024 33.2 32.0 - 36.0 g/dL Final     RDW   Date Value Ref Range Status   12/13/2024 13.2 11.5 - 14.5 % Final     Platelets   Date Value Ref Range Status   12/13/2024 394 150 - 450 x10*3/uL Final     MPV   Date Value Ref Range Status   10/07/2022 9.2 7.0 - 12.6 CU Final     Sodium   Date Value Ref Range Status   12/13/2024 139 136 - 145 mmol/L Final     Potassium   Date Value Ref Range Status   12/13/2024 4.2 3.5 - 5.3 mmol/L Final     Bicarbonate   Date Value Ref Range Status   12/13/2024 29 21 - 32 mmol/L Final     Urea Nitrogen   Date Value Ref Range Status   12/13/2024 11 6 - 23 mg/dL Final     Calcium   Date Value Ref Range Status   12/13/2024 10.3 8.6 - 10.3 mg/dL Final     Protime   Date Value Ref Range Status   06/26/2021 12.0 10.1 - 13.3 sec Final     INR   Date Value Ref Range Status   06/26/2021 1.0 0.9 - 1.1 Final         REVIEW OF RADIOLOGY   I have reviewed the following:  Radiology Studies           CT ankle R 7/10/24:  No acute fracture or dislocation. Postsurgical changes total ankle  arthroplasty. Hardware appears intact, well positioned and well  aligned. There is no periprosthetic fracture or lucency. Solid fusion  across subtalar joint with ghost tracts related to prior arthrodesis.  Unchanged bone-on-bone articulation along the inferior aspect of the  calcaneocuboid  joint. Mid and hindfoot joints are otherwise  maintained. No sizeable joint effusion.      SOFT TISSUES:  Muscle bulk is maintained. No subcutaneous fluid collection.      IMPRESSION:  Unremarkable total ankle arthroplasty.       ASSESSMENT & PLAN  Guy Moreland is a 39 y.o. male pt of Dr Chino with PMH HTN, GERD, obesity, IBS, MDD/CORKY, insomnia who presents for F/U R ankle pain likely 2/2 CRPS    1) RLE CRPS 2  -Chronic R ankle pain since fall off roof 2006 s/p 8 surgeries including total ankle replacement 2022 s/p 3 revisions, last of which was 5/1/24.   -Persistent R ankle pain with sympathetic signs/sx meeting [place] criteria for CRPS. Pain severe and inhibiting function, ADLs, sleep; impairing QoL  -Refractive to >10 y conservative tx including Tylenol, NSAIDs, duloxetine, >6 w PT, marijuana, gabapentin, m relaxants, CSI.  -CT R ankle 7/10/24: Unremarkable total ankle arthroplasty.  -Cont duloxetine 90 mg QD  -EMG BLE 1/10/2020: mild R L5, S1 radiculopathy; mild BL S1 conduction defect; R sural sensory nerve injury  -R L5, S1 DRG implant 3/13/25: 100% neuropathic pain relief, 50% pressure pain relief. 80% overall relief, hyperalgesia/allodynia resolved. Wounds almost completely healed except for edge of IPG implant where suture not came out yesterday. No sign of dehiscence or redness. No warmth, flunctuance, or drainage.   --Neosporin BID at edge of IPG incision scar where scab is located until heals completely. Avoid soaking in meantime  --Complex reprogramming provided for device optimization  --F/U 2-4 w          Today's visit involved continuation of chronic pain care. In the context of the complexity of this patient's chronic pain diagnosis, long-term expectations and care planning discussed. Adequate time taken to ensure patient understanding and answer questions. Imaging studies ordered are placed do elucidate the patient's diagnosis, but also to evaluate the patient's candidacy for procedural and  surgical interventions. The risks and benefits of these potential interventions are detailed as above.                Roxy Arrieta MD  Anesthesiologist & Interventional Pain Physician   [facility]  O: [phone]  F: [phone]  9:12 AM  05/02/25

## 2025-05-05 PROBLEM — Z96.82 PRESENCE OF NEUROSTIMULATOR: Status: ACTIVE | Noted: 2025-05-05

## 2025-05-08 DIAGNOSIS — G90.521 COMPLEX REGIONAL PAIN SYNDROME TYPE 1 OF RIGHT LOWER EXTREMITY: ICD-10-CM

## 2025-05-08 RX ORDER — DULOXETIN HYDROCHLORIDE 30 MG/1
CAPSULE, DELAYED RELEASE ORAL
Qty: 90 CAPSULE | Refills: 2 | Status: SHIPPED | OUTPATIENT
Start: 2025-05-08

## 2025-05-15 DIAGNOSIS — E78.2 MIXED HYPERLIPIDEMIA: ICD-10-CM

## 2025-05-15 RX ORDER — ATORVASTATIN CALCIUM 40 MG/1
40 TABLET, FILM COATED ORAL DAILY
Qty: 90 TABLET | Refills: 3 | Status: SHIPPED | OUTPATIENT
Start: 2025-05-15

## 2025-05-23 ENCOUNTER — LAB (OUTPATIENT)
Dept: LAB | Facility: HOSPITAL | Age: 40
End: 2025-05-23
Payer: COMMERCIAL

## 2025-05-23 ENCOUNTER — PRE-ADMISSION TESTING (OUTPATIENT)
Dept: PREADMISSION TESTING | Facility: HOSPITAL | Age: 40
End: 2025-05-23
Payer: COMMERCIAL

## 2025-05-23 VITALS
DIASTOLIC BLOOD PRESSURE: 85 MMHG | SYSTOLIC BLOOD PRESSURE: 126 MMHG | TEMPERATURE: 98.6 F | HEART RATE: 83 BPM | HEIGHT: 72 IN | BODY MASS INDEX: 35.08 KG/M2 | WEIGHT: 259 LBS | OXYGEN SATURATION: 96 %

## 2025-05-23 DIAGNOSIS — Z01.818 ENCOUNTER FOR OTHER PREPROCEDURAL EXAMINATION: Primary | ICD-10-CM

## 2025-05-23 DIAGNOSIS — Z01.818 PRE-OP EXAMINATION: Primary | ICD-10-CM

## 2025-05-23 LAB
ANION GAP SERPL CALCULATED.3IONS-SCNC: 9 MMOL/L (ref 10–20)
BASOPHILS # BLD AUTO: 0.04 X10*3/UL (ref 0–0.1)
BASOPHILS NFR BLD AUTO: 0.6 %
BUN SERPL-MCNC: 11 MG/DL (ref 6–23)
CALCIUM SERPL-MCNC: 9.3 MG/DL (ref 8.6–10.3)
CHLORIDE SERPL-SCNC: 103 MMOL/L (ref 98–107)
CO2 SERPL-SCNC: 28 MMOL/L (ref 21–32)
CREAT SERPL-MCNC: 0.81 MG/DL (ref 0.5–1.3)
EGFRCR SERPLBLD CKD-EPI 2021: >90 ML/MIN/1.73M*2
EOSINOPHIL # BLD AUTO: 0.17 X10*3/UL (ref 0–0.7)
EOSINOPHIL NFR BLD AUTO: 2.5 %
ERYTHROCYTE [DISTWIDTH] IN BLOOD BY AUTOMATED COUNT: 12.9 % (ref 11.5–14.5)
GLUCOSE SERPL-MCNC: 93 MG/DL (ref 74–99)
HCT VFR BLD AUTO: 39.8 % (ref 41–52)
HGB BLD-MCNC: 13.6 G/DL (ref 13.5–17.5)
IMM GRANULOCYTES # BLD AUTO: 0.03 X10*3/UL (ref 0–0.7)
IMM GRANULOCYTES NFR BLD AUTO: 0.4 % (ref 0–0.9)
LYMPHOCYTES # BLD AUTO: 1.89 X10*3/UL (ref 1.2–4.8)
LYMPHOCYTES NFR BLD AUTO: 28.3 %
MCH RBC QN AUTO: 28.8 PG (ref 26–34)
MCHC RBC AUTO-ENTMCNC: 34.2 G/DL (ref 32–36)
MCV RBC AUTO: 84 FL (ref 80–100)
MONOCYTES # BLD AUTO: 0.6 X10*3/UL (ref 0.1–1)
MONOCYTES NFR BLD AUTO: 9 %
NEUTROPHILS # BLD AUTO: 3.94 X10*3/UL (ref 1.2–7.7)
NEUTROPHILS NFR BLD AUTO: 59.2 %
NRBC BLD-RTO: 0 /100 WBCS (ref 0–0)
PLATELET # BLD AUTO: 325 X10*3/UL (ref 150–450)
POTASSIUM SERPL-SCNC: 4.3 MMOL/L (ref 3.5–5.3)
RBC # BLD AUTO: 4.73 X10*6/UL (ref 4.5–5.9)
SODIUM SERPL-SCNC: 136 MMOL/L (ref 136–145)
WBC # BLD AUTO: 6.7 X10*3/UL (ref 4.4–11.3)

## 2025-05-23 PROCEDURE — 99214 OFFICE O/P EST MOD 30 MIN: CPT

## 2025-05-23 PROCEDURE — 85025 COMPLETE CBC W/AUTO DIFF WBC: CPT

## 2025-05-23 PROCEDURE — 80048 BASIC METABOLIC PNL TOTAL CA: CPT

## 2025-05-23 PROCEDURE — 36415 COLL VENOUS BLD VENIPUNCTURE: CPT

## 2025-05-23 ASSESSMENT — ENCOUNTER SYMPTOMS
RESPIRATORY NEGATIVE: 1
ENDOCRINE NEGATIVE: 1
HEMATOLOGIC/LYMPHATIC NEGATIVE: 1
CONSTITUTIONAL NEGATIVE: 1
ALLERGIC/IMMUNOLOGIC NEGATIVE: 1
JOINT SWELLING: 1
PSYCHIATRIC NEGATIVE: 1
GASTROINTESTINAL NEGATIVE: 1
CARDIOVASCULAR NEGATIVE: 1
EYES NEGATIVE: 1

## 2025-05-23 ASSESSMENT — PAIN - FUNCTIONAL ASSESSMENT: PAIN_FUNCTIONAL_ASSESSMENT: 0-10

## 2025-05-23 ASSESSMENT — PAIN DESCRIPTION - DESCRIPTORS: DESCRIPTORS: STABBING

## 2025-05-23 ASSESSMENT — DUKE ACTIVITY SCORE INDEX (DASI)
CAN YOU WALK INDOORS, SUCH AS AROUND YOUR HOUSE: YES
CAN YOU TAKE CARE OF YOURSELF (EAT, DRESS, BATHE, OR USE TOILET): YES
CAN YOU PARTICIPATE IN STRENOUS SPORTS LIKE SWIMMING, SINGLES TENNIS, FOOTBALL, BASKETBALL, OR SKIING: NO
CAN YOU WALK A BLOCK OR TWO ON LEVEL GROUND: YES
CAN YOU PARTICIPATE IN MODERATE RECREATIONAL ACTIVITIES LIKE GOLF, BOWLING, DANCING, DOUBLES TENNIS OR THROWING A BASEBALL OR FOOTBALL: YES
CAN YOU DO YARD WORK LIKE RAKING LEAVES, WEEDING OR PUSHING A MOWER: YES
CAN YOU RUN A SHORT DISTANCE: NO
CAN YOU CLIMB A FLIGHT OF STAIRS OR WALK UP A HILL: YES
TOTAL_SCORE: 42.7
CAN YOU DO HEAVY WORK AROUND THE HOUSE LIKE SCRUBBING FLOORS OR LIFTING AND MOVING HEAVY FURNITURE: YES
DASI METS SCORE: 8
CAN YOU DO MODERATE WORK AROUND THE HOUSE LIKE VACUUMING, SWEEPING FLOORS OR CARRYING GROCERIES: YES
CAN YOU DO LIGHT WORK AROUND THE HOUSE LIKE DUSTING OR WASHING DISHES: YES
CAN YOU HAVE SEXUAL RELATIONS: YES

## 2025-05-23 ASSESSMENT — PAIN SCALES - GENERAL: PAINLEVEL_OUTOF10: 7

## 2025-05-23 NOTE — H&P (VIEW-ONLY)
"CPM/PAT Evaluation       Name: Guy Moreland (uGy Moreland \"Krishna\")  /Age: 1985/40 y.o.     In-Person       Chief Complaint: Right ankle pain    HPI : Guy Moreland is a 40 year old male with complex regional pain syndrome in the right foot and ankle from an injury in . He had multiple surgeries on the right ankle. He had a spinal cord stimulator implant in March. He states the pain has improved a lot. He is still having sharp pains in the heel and behind his ankle. He states sometimes he can't take a flat step on his foot and had to walk on the side of his foot. He is scheduled for a tarsal tunnel release. He denies fever, chills, nausea, vomiting, chest pain, SOB, dizziness, and palpitations.     Medical History[1]    Surgical History[2]    Social History     Tobacco Use    Smoking status: Former     Current packs/day: 0.00     Average packs/day: 0.5 packs/day for 13.0 years (6.5 ttl pk-yrs)     Types: Cigarettes     Start date:      Quit date: 2018     Years since quittin.3    Smokeless tobacco: Never    Tobacco comments:     Quit 2019   Substance Use Topics    Alcohol use: Not Currently     Alcohol/week: 6.0 standard drinks of alcohol     Types: 6 Cans of beer per week     Social History     Substance and Sexual Activity   Drug Use Yes    Types: Marijuana    Comment: SMOKE 3 TO 4 X WEEK         Family History[3]    Allergies[4]  Current Outpatient Medications   Medication Sig Dispense Refill    atorvastatin (Lipitor) 40 mg tablet TAKE ONE TABLET BY MOUTH EVERY DAY 90 tablet 3    busPIRone (Buspar) 15 mg tablet Take 1 tablet (15 mg) by mouth 2 times a day. 180 tablet 3    DULoxetine (Cymbalta) 30 mg DR capsule TAKE THREE (3) CAPSULES BY MOUTH ONCE DAILY. DO NOT CRUSH OR CHEW. 90 capsule 2    esomeprazole (NexIUM) 40 mg DR capsule Take 1 capsule (40 mg) by mouth once daily. 90 capsule 3    lisinopril 40 mg tablet Take 1 tablet (40 mg) by mouth once daily. 90 tablet 3    multivitamin with " minerals tablet Take 1 tablet by mouth once daily.      QUEtiapine (SEROquel) 100 mg tablet TAKE ONE TABLET BY MOUTH AT BEDTIME 90 tablet 1     No current facility-administered medications for this visit.          Review of Systems   Constitutional: Negative.    HENT: Negative.     Eyes: Negative.    Respiratory: Negative.     Cardiovascular: Negative.    Gastrointestinal: Negative.    Endocrine: Negative.    Genitourinary: Negative.    Musculoskeletal:  Positive for gait problem and joint swelling.        Right ankle/foot pain     Skin: Negative.    Allergic/Immunologic: Negative.    Hematological: Negative.    Psychiatric/Behavioral: Negative.             Physical Exam  Vitals reviewed.   Constitutional:       Appearance: Normal appearance.   HENT:      Head: Normocephalic and atraumatic.      Nose: Nose normal.      Mouth/Throat:      Mouth: Mucous membranes are moist.      Pharynx: Oropharynx is clear.   Eyes:      Extraocular Movements: Extraocular movements intact.      Conjunctiva/sclera: Conjunctivae normal.      Pupils: Pupils are equal, round, and reactive to light.   Cardiovascular:      Rate and Rhythm: Normal rate and regular rhythm.      Pulses: Normal pulses.      Heart sounds: Normal heart sounds.   Pulmonary:      Effort: Pulmonary effort is normal.      Breath sounds: Normal breath sounds.   Abdominal:      General: Bowel sounds are normal.      Palpations: Abdomen is soft.   Genitourinary:     Comments: Assessment deferred to physician  Musculoskeletal:      Cervical back: Normal range of motion and neck supple.      Right lower leg: Edema present.   Skin:     General: Skin is warm and dry.   Neurological:      General: No focal deficit present.      Mental Status: He is alert and oriented to person, place, and time.   Psychiatric:         Mood and Affect: Mood normal.         Behavior: Behavior normal.         Thought Content: Thought content normal.         Judgment: Judgment normal.           PAT AIRWAY:   Airway:     Mallampati::  I    TM distance::  >3 FB    Neck ROM::  Full  normal          Visit Vitals  /85   Pulse 83   Temp 37 °C (98.6 °F) (Temporal)   Ht 1.829 m (6')   Wt 117 kg (259 lb)   SpO2 96%   BMI 35.13 kg/m²   Smoking Status Former   BSA 2.44 m²     ASA: III  CHADS2: 2.8 %  RCRI: 0.4%  DASI: 42.7  METS: 8  STOP BAN          Assessment and Plan:     Tarsal tunnel syndrome of right side : RELEASE, TARSAL TUNNEL   Hypertension: Lisinopril  Hyperlipidemia: Atorvastatin  GERD: Nexium  Anxiety/Depression: Buspar, Cymbalta, Seroquel  BMI: 35.13      LABS: CBC, BMP ordered in PAT  DANIEL Romero               [1]   Past Medical History:  Diagnosis Date    Acute prostatitis 2019    Acute prostatitis with hematuria    Adverse effect of anesthesia     HX HA STATES MIGHT BE FROM PROPROFOL    Allergy status to unspecified drugs, medicaments and biological substances 2017    History of adverse drug reaction    Anxiety     Complex regional pain syndrome of right lower extremity     COVID-19 2022    COVID-19    Cutaneous abscess of left lower limb 2015    Abscess of knee, left    Depression     Dizziness and giddiness 2019    Dizzy spells    Eczema     Fatty liver     GERD (gastroesophageal reflux disease)     Hyperlipidemia     Hypertension     Left lower quadrant pain 2018    LLQ discomfort    Other intra-abdominal and pelvic swelling, mass and lump 2020    Nodule of groin    Other intra-abdominal and pelvic swelling, mass and lump 10/08/2020    Inguinal mass    Pain in right ankle and joints of right foot 2016    Chronic pain of right ankle    Pain in right lower leg 2020    Right calf pain    Panic attacks     Personal history of diseases of the blood and blood-forming organs and certain disorders involving the immune mechanism 2020    History of leukocytosis    Personal history of nicotine dependence 2016     History of nicotine dependence    Personal history of other (healed) physical injury and trauma 06/25/2017    History of sprain    Personal history of other diseases of the musculoskeletal system and connective tissue 06/03/2016    History of low back pain    Personal history of other endocrine, nutritional and metabolic disease 03/03/2015    History of obesity    Personal history of urinary (tract) infections 08/06/2014    Personal history of urinary tract infection    Sialolithiasis     Salivary calculus    Unspecified hearing loss, left ear 02/05/2018    Hearing loss of left ear   [2]   Past Surgical History:  Procedure Laterality Date    ABDOMINAL SURGERY      ANKLE SURGERY  01/04/2023    Ankle Surgery; total of 8 right ankle surgeries    COLONOSCOPY  06/05/2017    Colonoscopy (Fiberoptic)    HERNIA REPAIR  09/21/2020    Hernia Repair    OTHER SURGICAL HISTORY  06/05/2017    Incision And Drainage Of Skin Abscess Buttocks    OTHER SURGICAL HISTORY      necrotic lipoma removed from abdomen    OTHER SURGICAL HISTORY      I&D of left Buttock Abscess (MRSA)    TOTAL ANKLE ARTHROPLASTY Right    [3]   Family History  Problem Relation Name Age of Onset    Scleroderma Mother      Hyperlipidemia Father      Other (colon polyps, IBS, stomach polyps) Father      Raynaud syndrome Brother      Skin cancer Father's Brother      Parkinsonism Paternal Grandfather      Skin cancer Paternal Grandfather      Diabetes Other GrandFather    [4]   Allergies  Allergen Reactions    Sulfa (Sulfonamide Antibiotics) Unknown and Other     TOLD AS A CHILD NOT TO TAKE

## 2025-05-23 NOTE — CPM/PAT H&P
"CPM/PAT Evaluation       Name: Guy Moreland (Guy Moreland \"Krishna\")  /Age: 1985/40 y.o.     In-Person       Chief Complaint: Right ankle pain    HPI : Guy Moreland is a 40 year old male with complex regional pain syndrome in the right foot and ankle from an injury in . He had multiple surgeries on the right ankle. He had a spinal cord stimulator implant in March. He states the pain has improved a lot. He is still having sharp pains in the heel and behind his ankle. He states sometimes he can't take a flat step on his foot and had to walk on the side of his foot. He is scheduled for a tarsal tunnel release. He denies fever, chills, nausea, vomiting, chest pain, SOB, dizziness, and palpitations.     Medical History[1]    Surgical History[2]    Social History     Tobacco Use    Smoking status: Former     Current packs/day: 0.00     Average packs/day: 0.5 packs/day for 13.0 years (6.5 ttl pk-yrs)     Types: Cigarettes     Start date:      Quit date: 2018     Years since quittin.3    Smokeless tobacco: Never    Tobacco comments:     Quit 2019   Substance Use Topics    Alcohol use: Not Currently     Alcohol/week: 6.0 standard drinks of alcohol     Types: 6 Cans of beer per week     Social History     Substance and Sexual Activity   Drug Use Yes    Types: Marijuana    Comment: SMOKE 3 TO 4 X WEEK         Family History[3]    Allergies[4]  Current Outpatient Medications   Medication Sig Dispense Refill    atorvastatin (Lipitor) 40 mg tablet TAKE ONE TABLET BY MOUTH EVERY DAY 90 tablet 3    busPIRone (Buspar) 15 mg tablet Take 1 tablet (15 mg) by mouth 2 times a day. 180 tablet 3    DULoxetine (Cymbalta) 30 mg DR capsule TAKE THREE (3) CAPSULES BY MOUTH ONCE DAILY. DO NOT CRUSH OR CHEW. 90 capsule 2    esomeprazole (NexIUM) 40 mg DR capsule Take 1 capsule (40 mg) by mouth once daily. 90 capsule 3    lisinopril 40 mg tablet Take 1 tablet (40 mg) by mouth once daily. 90 tablet 3    multivitamin with " minerals tablet Take 1 tablet by mouth once daily.      QUEtiapine (SEROquel) 100 mg tablet TAKE ONE TABLET BY MOUTH AT BEDTIME 90 tablet 1     No current facility-administered medications for this visit.          Review of Systems   Constitutional: Negative.    HENT: Negative.     Eyes: Negative.    Respiratory: Negative.     Cardiovascular: Negative.    Gastrointestinal: Negative.    Endocrine: Negative.    Genitourinary: Negative.    Musculoskeletal:  Positive for gait problem and joint swelling.        Right ankle/foot pain     Skin: Negative.    Allergic/Immunologic: Negative.    Hematological: Negative.    Psychiatric/Behavioral: Negative.             Physical Exam  Vitals reviewed.   Constitutional:       Appearance: Normal appearance.   HENT:      Head: Normocephalic and atraumatic.      Nose: Nose normal.      Mouth/Throat:      Mouth: Mucous membranes are moist.      Pharynx: Oropharynx is clear.   Eyes:      Extraocular Movements: Extraocular movements intact.      Conjunctiva/sclera: Conjunctivae normal.      Pupils: Pupils are equal, round, and reactive to light.   Cardiovascular:      Rate and Rhythm: Normal rate and regular rhythm.      Pulses: Normal pulses.      Heart sounds: Normal heart sounds.   Pulmonary:      Effort: Pulmonary effort is normal.      Breath sounds: Normal breath sounds.   Abdominal:      General: Bowel sounds are normal.      Palpations: Abdomen is soft.   Genitourinary:     Comments: Assessment deferred to physician  Musculoskeletal:      Cervical back: Normal range of motion and neck supple.      Right lower leg: Edema present.   Skin:     General: Skin is warm and dry.   Neurological:      General: No focal deficit present.      Mental Status: He is alert and oriented to person, place, and time.   Psychiatric:         Mood and Affect: Mood normal.         Behavior: Behavior normal.         Thought Content: Thought content normal.         Judgment: Judgment normal.           PAT AIRWAY:   Airway:     Mallampati::  I    TM distance::  >3 FB    Neck ROM::  Full  normal          Visit Vitals  /85   Pulse 83   Temp 37 °C (98.6 °F) (Temporal)   Ht 1.829 m (6')   Wt 117 kg (259 lb)   SpO2 96%   BMI 35.13 kg/m²   Smoking Status Former   BSA 2.44 m²     ASA: III  CHADS2: 2.8 %  RCRI: 0.4%  DASI: 42.7  METS: 8  STOP BAN          Assessment and Plan:     Tarsal tunnel syndrome of right side : RELEASE, TARSAL TUNNEL   Hypertension: Lisinopril  Hyperlipidemia: Atorvastatin  GERD: Nexium  Anxiety/Depression: Buspar, Cymbalta, Seroquel  BMI: 35.13      LABS: CBC, BMP ordered in PAT  DANIEL Romero               [1]   Past Medical History:  Diagnosis Date    Acute prostatitis 2019    Acute prostatitis with hematuria    Adverse effect of anesthesia     HX HA STATES MIGHT BE FROM PROPROFOL    Allergy status to unspecified drugs, medicaments and biological substances 2017    History of adverse drug reaction    Anxiety     Complex regional pain syndrome of right lower extremity     COVID-19 2022    COVID-19    Cutaneous abscess of left lower limb 2015    Abscess of knee, left    Depression     Dizziness and giddiness 2019    Dizzy spells    Eczema     Fatty liver     GERD (gastroesophageal reflux disease)     Hyperlipidemia     Hypertension     Left lower quadrant pain 2018    LLQ discomfort    Other intra-abdominal and pelvic swelling, mass and lump 2020    Nodule of groin    Other intra-abdominal and pelvic swelling, mass and lump 10/08/2020    Inguinal mass    Pain in right ankle and joints of right foot 2016    Chronic pain of right ankle    Pain in right lower leg 2020    Right calf pain    Panic attacks     Personal history of diseases of the blood and blood-forming organs and certain disorders involving the immune mechanism 2020    History of leukocytosis    Personal history of nicotine dependence 2016     History of nicotine dependence    Personal history of other (healed) physical injury and trauma 06/25/2017    History of sprain    Personal history of other diseases of the musculoskeletal system and connective tissue 06/03/2016    History of low back pain    Personal history of other endocrine, nutritional and metabolic disease 03/03/2015    History of obesity    Personal history of urinary (tract) infections 08/06/2014    Personal history of urinary tract infection    Sialolithiasis     Salivary calculus    Unspecified hearing loss, left ear 02/05/2018    Hearing loss of left ear   [2]   Past Surgical History:  Procedure Laterality Date    ABDOMINAL SURGERY      ANKLE SURGERY  01/04/2023    Ankle Surgery; total of 8 right ankle surgeries    COLONOSCOPY  06/05/2017    Colonoscopy (Fiberoptic)    HERNIA REPAIR  09/21/2020    Hernia Repair    OTHER SURGICAL HISTORY  06/05/2017    Incision And Drainage Of Skin Abscess Buttocks    OTHER SURGICAL HISTORY      necrotic lipoma removed from abdomen    OTHER SURGICAL HISTORY      I&D of left Buttock Abscess (MRSA)    TOTAL ANKLE ARTHROPLASTY Right    [3]   Family History  Problem Relation Name Age of Onset    Scleroderma Mother      Hyperlipidemia Father      Other (colon polyps, IBS, stomach polyps) Father      Raynaud syndrome Brother      Skin cancer Father's Brother      Parkinsonism Paternal Grandfather      Skin cancer Paternal Grandfather      Diabetes Other GrandFather    [4]   Allergies  Allergen Reactions    Sulfa (Sulfonamide Antibiotics) Unknown and Other     TOLD AS A CHILD NOT TO TAKE

## 2025-05-23 NOTE — PREPROCEDURE INSTRUCTIONS
Medication List            Accurate as of May 23, 2025  9:27 AM. Always use your most recent med list.                atorvastatin 40 mg tablet  Commonly known as: Lipitor  TAKE ONE TABLET BY MOUTH EVERY DAY  Medication Adjustments for Surgery: Take on the morning of surgery     busPIRone 15 mg tablet  Commonly known as: Buspar  Take 1 tablet (15 mg) by mouth 2 times a day.  Medication Adjustments for Surgery: Take on the morning of surgery     DULoxetine 30 mg DR capsule  Commonly known as: Cymbalta  TAKE THREE (3) CAPSULES BY MOUTH ONCE DAILY. DO NOT CRUSH OR CHEW.  Medication Adjustments for Surgery: Take on the morning of surgery     esomeprazole 40 mg DR capsule  Commonly known as: NexIUM  Take 1 capsule (40 mg) by mouth once daily.  Medication Adjustments for Surgery: Take on the morning of surgery     lisinopril 40 mg tablet  Take 1 tablet (40 mg) by mouth once daily.  Medication Adjustments for Surgery: Do Not take on the morning of surgery     multivitamin with minerals tablet  Additional Medication Adjustments for Surgery: Take last dose 7 days before surgery     QUEtiapine 100 mg tablet  Commonly known as: SEROquel  TAKE ONE TABLET BY MOUTH AT BEDTIME  Medication Adjustments for Surgery: Take/Use as prescribed                              NPO Instructions:          Why must I stop eating and drinking near surgery time?  With sedation, food or liquid in your stomach can enter your lungs causing serious complications  Increases nausea and vomiting    When do I need to stop eating and drinking before my surgery?   Do not eat or drink after midnight the night before your surgery/procedure.  You may have small sips of water to take your medication.    Additional Instructions:     Day of Surgery:  Review your medication instructions, take indicated medications  Wear  comfortable loose fitting clothing  Do not use moisturizers, creams, lotions or perfume  All jewelry and valuables should be left at  home    PAT DISCHARGE INSTRUCTIONS    Please call the Same Day Surgery (SDS) Department of the hospital where your procedure will be performed after 2:00 PM the day before your surgery. If you are scheduled on a Monday, or a Tuesday following a Monday holiday, you will need to call on the last business day prior to your surgery.        [address], [address] [phone]  Second Floor        Please let your surgeon know if:      You develop any open sores, shingles, burning or painful urination as these may increase your risk of an infection.   You no longer wish to have the surgery.   Any other personal circumstances change that may lead to the need to cancel or defer this surgery-such as being sick or getting admitted to any hospital within one week of your planned procedure.    Your contact details change, such as a change of address or phone number.    Starting now:     Please DO NOT drink alcohol or smoke for 24 hours before surgery. It is well known that quitting smoking can make a huge difference to your health and recovery from surgery. The longer you abstain from smoking, the better your chances of a healthy recovery. If you need help with quitting, call 6-653-QUIT-NOW to be connected to a trained counselor who will discuss the best methods to help you quit.     Before your surgery:    Please stop all supplements INCLUDING MARIJUANA 7 days prior to surgery. Or as directed by your surgeon.   Please stop taking NSAID pain medicine such as Advil and Motrin 7 days before surgery.    If you develop any fever, cough, cold, rashes, cuts, scratches, scrapes, urinary symptoms or infection anywhere on your body (including teeth and gums) prior to surgery, please call your surgeon’s office as soon as possible. This may require treatment to reduce the chance of cancellation on the day of surgery.    The day before your surgery:   DIET- Please follow the  diet instructions at the top of your packet.   Get a good night’s rest.  Use the special soap for bathing if you have been instructed to use one.    Scheduled surgery times may change and you will be notified if this occurs - please check your personal voicemail for any updates.     On the morning of surgery:   Wear comfortable, loose fitting clothes which open in the front. Please do not wear moisturizers, creams, lotions, makeup or perfume.    Please bring with you to surgery:   Photo ID and insurance card   Current list of medicines and allergies   Pacemaker/ Defibrillator/Heart stent cards   CPAP machine and mask    Slings/ splints/ crutches   A copy of your complete advanced directive/DHPOA.    Please do NOT bring with you to surgery:   All jewelry and valuables should be left at home.   Prosthetic devices such as contact lenses, hearing aids, dentures, eyelash extensions, hairpins and body piercings must be removed prior to going in to the surgical suite.    After outpatient surgery:   A responsible adult MUST accompany you at the time of discharge and stay with you for 24 hours after your surgery. You may NOT drive yourself home after surgery.    Do not drive, operate machinery, make critical decisions or do activities that require co-ordination or balance until after a night’s sleep.   Do not drink alcoholic beverages for 24 hours.   Instructions for resuming your medications will be provided by your surgeon.    CALL YOUR DOCTOR AFTER SURGERY IF YOU HAVE:     Chills and/or a fever of 101° F or higher.    Redness, swelling, pus or drainage from your surgical wound or a bad smell from the wound.    Lightheadedness, fainting or confusion.    Persistent vomiting (throwing up) and are not able to eat or drink for 12 hours.    Three or more loose, watery bowel movements in 24 hours (diarrhea).   Difficulty or pain while urinating( after non-urological surgery)    Pain and swelling in your legs, especially if it is  only on one side.    Difficulty breathing or are breathing faster than normal.    Any new concerning symptoms.

## 2025-05-24 ENCOUNTER — PATIENT MESSAGE (OUTPATIENT)
Dept: PRIMARY CARE | Facility: CLINIC | Age: 40
End: 2025-05-24
Payer: COMMERCIAL

## 2025-05-24 DIAGNOSIS — E78.2 MIXED HYPERLIPIDEMIA: ICD-10-CM

## 2025-05-24 LAB
ALBUMIN SERPL-MCNC: 4.5 G/DL (ref 3.6–5.1)
ALP SERPL-CCNC: 75 U/L (ref 36–130)
ALT SERPL-CCNC: 16 U/L (ref 9–46)
ANION GAP SERPL CALCULATED.4IONS-SCNC: 10 MMOL/L (CALC) (ref 7–17)
AST SERPL-CCNC: 19 U/L (ref 10–40)
BILIRUB SERPL-MCNC: 0.6 MG/DL (ref 0.2–1.2)
BUN SERPL-MCNC: 11 MG/DL (ref 7–25)
CALCIUM SERPL-MCNC: 9.5 MG/DL (ref 8.6–10.3)
CHLORIDE SERPL-SCNC: 100 MMOL/L (ref 98–110)
CHOLEST SERPL-MCNC: 230 MG/DL
CHOLEST/HDLC SERPL: 4.3 (CALC)
CO2 SERPL-SCNC: 26 MMOL/L (ref 20–32)
CREAT SERPL-MCNC: 0.77 MG/DL (ref 0.6–1.29)
EGFRCR SERPLBLD CKD-EPI 2021: 116 ML/MIN/1.73M2
ERYTHROCYTE [DISTWIDTH] IN BLOOD BY AUTOMATED COUNT: 13.2 % (ref 11–15)
EST. AVERAGE GLUCOSE BLD GHB EST-MCNC: 114 MG/DL
EST. AVERAGE GLUCOSE BLD GHB EST-SCNC: 6.3 MMOL/L
GLUCOSE SERPL-MCNC: 91 MG/DL (ref 65–99)
HBA1C MFR BLD: 5.6 %
HCT VFR BLD AUTO: 41.6 % (ref 38.5–50)
HDLC SERPL-MCNC: 54 MG/DL
HGB BLD-MCNC: 14.1 G/DL (ref 13.2–17.1)
LDLC SERPL CALC-MCNC: 145 MG/DL (CALC)
MCH RBC QN AUTO: 29.7 PG (ref 27–33)
MCHC RBC AUTO-ENTMCNC: 33.9 G/DL (ref 32–36)
MCV RBC AUTO: 87.6 FL (ref 80–100)
NONHDLC SERPL-MCNC: 176 MG/DL (CALC)
PLATELET # BLD AUTO: 322 THOUSAND/UL (ref 140–400)
PMV BLD REES-ECKER: 9.2 FL (ref 7.5–12.5)
POTASSIUM SERPL-SCNC: 4.4 MMOL/L (ref 3.5–5.3)
PROT SERPL-MCNC: 6.9 G/DL (ref 6.1–8.1)
RBC # BLD AUTO: 4.75 MILLION/UL (ref 4.2–5.8)
SODIUM SERPL-SCNC: 136 MMOL/L (ref 135–146)
TESTOST SERPL-MCNC: 430 NG/DL (ref 250–827)
TRIGL SERPL-MCNC: 172 MG/DL
WBC # BLD AUTO: 6.5 THOUSAND/UL (ref 3.8–10.8)

## 2025-05-30 ENCOUNTER — PHARMACY VISIT (OUTPATIENT)
Dept: PHARMACY | Facility: CLINIC | Age: 40
End: 2025-05-30
Payer: COMMERCIAL

## 2025-05-30 ENCOUNTER — HOSPITAL ENCOUNTER (OUTPATIENT)
Facility: HOSPITAL | Age: 40
Setting detail: OUTPATIENT SURGERY
Discharge: HOME | End: 2025-05-30
Attending: PODIATRIST | Admitting: PODIATRIST
Payer: COMMERCIAL

## 2025-05-30 ENCOUNTER — ANESTHESIA EVENT (OUTPATIENT)
Dept: OPERATING ROOM | Facility: HOSPITAL | Age: 40
End: 2025-05-30
Payer: COMMERCIAL

## 2025-05-30 ENCOUNTER — ANESTHESIA (OUTPATIENT)
Dept: OPERATING ROOM | Facility: HOSPITAL | Age: 40
End: 2025-05-30
Payer: COMMERCIAL

## 2025-05-30 VITALS
TEMPERATURE: 97.9 F | DIASTOLIC BLOOD PRESSURE: 76 MMHG | HEIGHT: 72 IN | BODY MASS INDEX: 34.94 KG/M2 | OXYGEN SATURATION: 95 % | SYSTOLIC BLOOD PRESSURE: 123 MMHG | HEART RATE: 73 BPM | RESPIRATION RATE: 16 BRPM | WEIGHT: 257.94 LBS

## 2025-05-30 DIAGNOSIS — M25.571 PAIN AND SWELLING OF RIGHT ANKLE: Primary | ICD-10-CM

## 2025-05-30 DIAGNOSIS — M25.471 PAIN AND SWELLING OF RIGHT ANKLE: Primary | ICD-10-CM

## 2025-05-30 PROCEDURE — 2500000004 HC RX 250 GENERAL PHARMACY W/ HCPCS (ALT 636 FOR OP/ED): Mod: JZ | Performed by: ANESTHESIOLOGIST ASSISTANT

## 2025-05-30 PROCEDURE — 7100000010 HC PHASE TWO TIME - EACH INCREMENTAL 1 MINUTE: Performed by: PODIATRIST

## 2025-05-30 PROCEDURE — 2500000004 HC RX 250 GENERAL PHARMACY W/ HCPCS (ALT 636 FOR OP/ED): Mod: JZ | Performed by: ANESTHESIOLOGY

## 2025-05-30 PROCEDURE — 7100000009 HC PHASE TWO TIME - INITIAL BASE CHARGE: Performed by: PODIATRIST

## 2025-05-30 PROCEDURE — 2500000004 HC RX 250 GENERAL PHARMACY W/ HCPCS (ALT 636 FOR OP/ED): Performed by: PODIATRIST

## 2025-05-30 PROCEDURE — 2500000004 HC RX 250 GENERAL PHARMACY W/ HCPCS (ALT 636 FOR OP/ED): Mod: JZ | Performed by: NURSE ANESTHETIST, CERTIFIED REGISTERED

## 2025-05-30 PROCEDURE — 2500000005 HC RX 250 GENERAL PHARMACY W/O HCPCS: Performed by: NURSE ANESTHETIST, CERTIFIED REGISTERED

## 2025-05-30 PROCEDURE — 3600000003 HC OR TIME - INITIAL BASE CHARGE - PROCEDURE LEVEL THREE: Performed by: PODIATRIST

## 2025-05-30 PROCEDURE — 3700000001 HC GENERAL ANESTHESIA TIME - INITIAL BASE CHARGE: Performed by: PODIATRIST

## 2025-05-30 PROCEDURE — 7100000001 HC RECOVERY ROOM TIME - INITIAL BASE CHARGE: Performed by: PODIATRIST

## 2025-05-30 PROCEDURE — 7100000002 HC RECOVERY ROOM TIME - EACH INCREMENTAL 1 MINUTE: Performed by: PODIATRIST

## 2025-05-30 PROCEDURE — 3600000008 HC OR TIME - EACH INCREMENTAL 1 MINUTE - PROCEDURE LEVEL THREE: Performed by: PODIATRIST

## 2025-05-30 PROCEDURE — RXMED WILLOW AMBULATORY MEDICATION CHARGE

## 2025-05-30 PROCEDURE — 2780000003 HC OR 278 NO HCPCS: Performed by: PODIATRIST

## 2025-05-30 PROCEDURE — 2500000004 HC RX 250 GENERAL PHARMACY W/ HCPCS (ALT 636 FOR OP/ED): Mod: JZ | Performed by: PODIATRIST

## 2025-05-30 PROCEDURE — 3700000002 HC GENERAL ANESTHESIA TIME - EACH INCREMENTAL 1 MINUTE: Performed by: PODIATRIST

## 2025-05-30 PROCEDURE — 2500000004 HC RX 250 GENERAL PHARMACY W/ HCPCS (ALT 636 FOR OP/ED): Performed by: NURSE ANESTHETIST, CERTIFIED REGISTERED

## 2025-05-30 DEVICE — IMPLANTABLE DEVICE: Type: IMPLANTABLE DEVICE | Site: FOOT | Status: FUNCTIONAL

## 2025-05-30 RX ORDER — SODIUM CHLORIDE, SODIUM LACTATE, POTASSIUM CHLORIDE, CALCIUM CHLORIDE 600; 310; 30; 20 MG/100ML; MG/100ML; MG/100ML; MG/100ML
INJECTION, SOLUTION INTRAVENOUS CONTINUOUS PRN
Status: DISCONTINUED | OUTPATIENT
Start: 2025-05-30 | End: 2025-05-30

## 2025-05-30 RX ORDER — LIDOCAINE HYDROCHLORIDE 10 MG/ML
INJECTION, SOLUTION INFILTRATION; PERINEURAL AS NEEDED
Status: DISCONTINUED | OUTPATIENT
Start: 2025-05-30 | End: 2025-05-30 | Stop reason: HOSPADM

## 2025-05-30 RX ORDER — GLYCOPYRROLATE 0.2 MG/ML
INJECTION INTRAMUSCULAR; INTRAVENOUS AS NEEDED
Status: DISCONTINUED | OUTPATIENT
Start: 2025-05-30 | End: 2025-05-30

## 2025-05-30 RX ORDER — PROPOFOL 10 MG/ML
INJECTION, EMULSION INTRAVENOUS AS NEEDED
Status: DISCONTINUED | OUTPATIENT
Start: 2025-05-30 | End: 2025-05-30

## 2025-05-30 RX ORDER — LIDOCAINE HYDROCHLORIDE 20 MG/ML
INJECTION, SOLUTION INFILTRATION; PERINEURAL AS NEEDED
Status: DISCONTINUED | OUTPATIENT
Start: 2025-05-30 | End: 2025-05-30

## 2025-05-30 RX ORDER — MIDAZOLAM HYDROCHLORIDE 1 MG/ML
INJECTION, SOLUTION INTRAMUSCULAR; INTRAVENOUS AS NEEDED
Status: DISCONTINUED | OUTPATIENT
Start: 2025-05-30 | End: 2025-05-30

## 2025-05-30 RX ORDER — DEXMEDETOMIDINE IN 0.9 % NACL 20 MCG/5ML
SYRINGE (ML) INTRAVENOUS AS NEEDED
Status: DISCONTINUED | OUTPATIENT
Start: 2025-05-30 | End: 2025-05-30

## 2025-05-30 RX ORDER — KETOROLAC TROMETHAMINE 30 MG/ML
INJECTION, SOLUTION INTRAMUSCULAR; INTRAVENOUS AS NEEDED
Status: DISCONTINUED | OUTPATIENT
Start: 2025-05-30 | End: 2025-05-30

## 2025-05-30 RX ORDER — FENTANYL CITRATE 50 UG/ML
50 INJECTION, SOLUTION INTRAMUSCULAR; INTRAVENOUS EVERY 5 MIN PRN
Status: DISCONTINUED | OUTPATIENT
Start: 2025-05-30 | End: 2025-05-30 | Stop reason: HOSPADM

## 2025-05-30 RX ORDER — FENTANYL CITRATE 50 UG/ML
INJECTION, SOLUTION INTRAMUSCULAR; INTRAVENOUS AS NEEDED
Status: DISCONTINUED | OUTPATIENT
Start: 2025-05-30 | End: 2025-05-30

## 2025-05-30 RX ORDER — OXYCODONE AND ACETAMINOPHEN 5; 325 MG/1; MG/1
1 TABLET ORAL EVERY 6 HOURS PRN
Qty: 28 TABLET | Refills: 0 | Status: SHIPPED | OUTPATIENT
Start: 2025-05-30 | End: 2025-06-06

## 2025-05-30 RX ORDER — FENTANYL CITRATE 50 UG/ML
25 INJECTION, SOLUTION INTRAMUSCULAR; INTRAVENOUS EVERY 5 MIN PRN
Status: DISCONTINUED | OUTPATIENT
Start: 2025-05-30 | End: 2025-05-30 | Stop reason: HOSPADM

## 2025-05-30 RX ORDER — ASPIRIN 81 MG/1
81 TABLET ORAL 2 TIMES DAILY
Qty: 30 TABLET | Refills: 0 | Status: SHIPPED | OUTPATIENT
Start: 2025-05-30 | End: 2025-06-14

## 2025-05-30 RX ORDER — BUPIVACAINE HYDROCHLORIDE 5 MG/ML
INJECTION, SOLUTION EPIDURAL; INTRACAUDAL; PERINEURAL AS NEEDED
Status: DISCONTINUED | OUTPATIENT
Start: 2025-05-30 | End: 2025-05-30 | Stop reason: HOSPADM

## 2025-05-30 RX ORDER — SODIUM CHLORIDE, SODIUM LACTATE, POTASSIUM CHLORIDE, CALCIUM CHLORIDE 600; 310; 30; 20 MG/100ML; MG/100ML; MG/100ML; MG/100ML
100 INJECTION, SOLUTION INTRAVENOUS CONTINUOUS
Status: DISCONTINUED | OUTPATIENT
Start: 2025-05-30 | End: 2025-05-30 | Stop reason: HOSPADM

## 2025-05-30 RX ORDER — LIDOCAINE HYDROCHLORIDE 10 MG/ML
0.1 INJECTION, SOLUTION INFILTRATION; PERINEURAL ONCE
Status: DISCONTINUED | OUTPATIENT
Start: 2025-05-30 | End: 2025-05-30 | Stop reason: HOSPADM

## 2025-05-30 RX ORDER — CEFAZOLIN SODIUM 2 G/50ML
2 SOLUTION INTRAVENOUS ONCE
Status: COMPLETED | OUTPATIENT
Start: 2025-05-30 | End: 2025-05-30

## 2025-05-30 RX ORDER — PROMETHAZINE HYDROCHLORIDE 25 MG/1
25 TABLET ORAL EVERY 6 HOURS PRN
Qty: 24 TABLET | Refills: 0 | Status: SHIPPED | OUTPATIENT
Start: 2025-05-30 | End: 2025-06-29

## 2025-05-30 RX ORDER — ONDANSETRON HYDROCHLORIDE 2 MG/ML
INJECTION, SOLUTION INTRAVENOUS AS NEEDED
Status: DISCONTINUED | OUTPATIENT
Start: 2025-05-30 | End: 2025-05-30

## 2025-05-30 RX ADMIN — FENTANYL CITRATE 50 MCG: 50 INJECTION INTRAMUSCULAR; INTRAVENOUS at 12:45

## 2025-05-30 RX ADMIN — Medication 30 MG: at 10:48

## 2025-05-30 RX ADMIN — Medication 10 MG: at 11:33

## 2025-05-30 RX ADMIN — ONDANSETRON 4 MG: 2 INJECTION, SOLUTION INTRAMUSCULAR; INTRAVENOUS at 11:31

## 2025-05-30 RX ADMIN — FENTANYL CITRATE 50 MCG: 50 INJECTION INTRAMUSCULAR; INTRAVENOUS at 12:22

## 2025-05-30 RX ADMIN — SODIUM CHLORIDE, POTASSIUM CHLORIDE, SODIUM LACTATE AND CALCIUM CHLORIDE: 600; 310; 30; 20 INJECTION, SOLUTION INTRAVENOUS at 11:06

## 2025-05-30 RX ADMIN — GLYCOPYRROLATE 0.2 MG: 0.2 INJECTION INTRAMUSCULAR; INTRAVENOUS at 10:55

## 2025-05-30 RX ADMIN — Medication 12 MCG: at 10:50

## 2025-05-30 RX ADMIN — HYDROMORPHONE HYDROCHLORIDE 0.4 MG: 0.5 INJECTION, SOLUTION INTRAMUSCULAR; INTRAVENOUS; SUBCUTANEOUS at 12:32

## 2025-05-30 RX ADMIN — FENTANYL CITRATE 50 MCG: 50 INJECTION, SOLUTION INTRAMUSCULAR; INTRAVENOUS at 10:53

## 2025-05-30 RX ADMIN — HYDROMORPHONE HYDROCHLORIDE 0.5 MG: 2 INJECTION, SOLUTION INTRAMUSCULAR; INTRAVENOUS; SUBCUTANEOUS at 11:48

## 2025-05-30 RX ADMIN — FENTANYL CITRATE 50 MCG: 50 INJECTION INTRAMUSCULAR; INTRAVENOUS at 12:04

## 2025-05-30 RX ADMIN — PROPOFOL 200 MG: 10 INJECTION, EMULSION INTRAVENOUS at 10:30

## 2025-05-30 RX ADMIN — HYDROMORPHONE HYDROCHLORIDE 0.4 MG: 0.5 INJECTION, SOLUTION INTRAMUSCULAR; INTRAVENOUS; SUBCUTANEOUS at 13:08

## 2025-05-30 RX ADMIN — Medication 10 MG: at 11:29

## 2025-05-30 RX ADMIN — FENTANYL CITRATE 50 MCG: 50 INJECTION, SOLUTION INTRAMUSCULAR; INTRAVENOUS at 10:49

## 2025-05-30 RX ADMIN — HYDROMORPHONE HYDROCHLORIDE 0.4 MG: 0.5 INJECTION, SOLUTION INTRAMUSCULAR; INTRAVENOUS; SUBCUTANEOUS at 12:14

## 2025-05-30 RX ADMIN — CEFAZOLIN SODIUM 2 G: 2 SOLUTION INTRAVENOUS at 10:41

## 2025-05-30 RX ADMIN — KETOROLAC TROMETHAMINE 30 MG: 30 INJECTION, SOLUTION INTRAMUSCULAR at 11:50

## 2025-05-30 RX ADMIN — SODIUM CHLORIDE, POTASSIUM CHLORIDE, SODIUM LACTATE AND CALCIUM CHLORIDE: 600; 310; 30; 20 INJECTION, SOLUTION INTRAVENOUS at 10:20

## 2025-05-30 RX ADMIN — DEXAMETHASONE SODIUM PHOSPHATE 10 MG: 4 INJECTION, SOLUTION INTRAMUSCULAR; INTRAVENOUS at 10:53

## 2025-05-30 RX ADMIN — LIDOCAINE HYDROCHLORIDE 100 MG: 20 INJECTION, SOLUTION INFILTRATION; PERINEURAL at 10:30

## 2025-05-30 RX ADMIN — MIDAZOLAM 2 MG: 1 INJECTION INTRAMUSCULAR; INTRAVENOUS at 10:25

## 2025-05-30 SDOH — HEALTH STABILITY: MENTAL HEALTH: CURRENT SMOKER: 0

## 2025-05-30 ASSESSMENT — PAIN DESCRIPTION - DESCRIPTORS
DESCRIPTORS: STABBING
DESCRIPTORS: THROBBING
DESCRIPTORS: THROBBING
DESCRIPTORS: ACHING
DESCRIPTORS: STABBING

## 2025-05-30 ASSESSMENT — PAIN SCALES - GENERAL
PAINLEVEL_OUTOF10: 8
PAINLEVEL_OUTOF10: 4
PAINLEVEL_OUTOF10: 4
PAINLEVEL_OUTOF10: 9
PAIN_LEVEL: 3
PAINLEVEL_OUTOF10: 5 - MODERATE PAIN
PAINLEVEL_OUTOF10: 9
PAINLEVEL_OUTOF10: 8
PAINLEVEL_OUTOF10: 8
PAINLEVEL_OUTOF10: 7

## 2025-05-30 ASSESSMENT — PAIN - FUNCTIONAL ASSESSMENT
PAIN_FUNCTIONAL_ASSESSMENT: 0-10

## 2025-05-30 ASSESSMENT — PAIN DESCRIPTION - ORIENTATION
ORIENTATION: RIGHT

## 2025-05-30 ASSESSMENT — PAIN DESCRIPTION - LOCATION
LOCATION: ANKLE

## 2025-05-30 NOTE — BRIEF OP NOTE
"Date: 2025  OR Location: [facility] OR    Name: Guy Moreland \"Latoya", : 1985, Age: 40 y.o., MRN: [id number], Sex: male    Diagnosis  Pre-op Diagnosis      * Tarsal tunnel syndrome of right side [G57.51] Post-op Diagnosis     * Tarsal tunnel syndrome of right side [G57.51]     Procedures  RELEASE, TARSAL TUNNEL  12442 - [place] RELEASE TARSAL TUNNEL      Surgeons      * Candace Chino - Primary    Resident/Fellow/Other Assistant:  Surgeons and Role:     * Shiva Vivas DPM - Fellow    Staff:   Circulator: Roseanna Stark Person: Janice Roblero Scrub: Becca    Anesthesia Staff: Anesthesiologist: Carlos Acevedo MD  CRNA: MAKI Razo  Frontline Breaker: NOLAN Collado    Procedure Summary  Anesthesia: Anesthesia type not filed in the log.  ASA: II  Estimated Blood Loss: 5mL  Intra-op Medications:   Administrations occurring from 0915 to 1045 on 25:   Medication Name Total Dose   LR infusion Cannot be calculated   lidocaine (Xylocaine) injection 2 % 100 mg   midazolam (Versed) injection 1 mg/mL 2 mg   propofol (Diprivan) injection 10 mg/mL 200 mg   ceFAZolin (Ancef) 2 g in dextrose (iso) IV 50 mL 2 g              Anesthesia Record               Intraprocedure I/O Totals          Intake    LR infusion 1000.00 mL    Total Intake 1000 mL          Specimen: No specimens collected               Findings: see op note    Complications:  None; patient tolerated the procedure well.     Disposition: PACU - hemodynamically stable.  Condition: stable  Specimens Collected: No specimens collected  Attending Attestation: I was present and scrubbed for the entire procedure.    Candace Chino  Phone Number: [phone]  "

## 2025-05-30 NOTE — DISCHARGE INSTRUCTIONS
[facility] POST OPERATIVE INSTRUCTIONS    RESTRICTIONS AFTER ANESTHESIA:   - Do not drive, work with heavy equipment or sign legal documents for 24 hours  - Rest at home for 24 hours following anesthesia  - You may experience light-headedness, dizziness, nausea, or sleepiness after surgery.    Stay in bed if you experience these symptoms  - Do not stay alone. A responsible adult must be with you for 24 hours.   - Do not take any alcoholic beverages or sleeping pills for the next 18 hours  - You may experience muscle aches and sore throat  - If you experience difficulty breathing and/or shortness of breath, seek IMMEDIATE    medical attention.     DRESSING: Keep surgical area clean and dry. Do NOT remove dressing. You may notice blood upon your bandage. Bleeding is expected and your bandage may become stained. You may reinforce with gauze or ace bandage.     BATHING: Sponge bath only or use of a cast protector in the shower until first post op visit.  - NWB right lower extrmeity   - After discharge from the surgery center, go directly home and limit your activities.  - Keep children and pets away from your operative foot.  - No heavy lifting.    ELEVATION: Elevate the operative site above the level of your heart for at least the next 3 days. This will help decrease pain and prevent swelling. Support the back of your knee with a pillow.     ICE: Use ice pack behind {RIGHT / LEFT2:99141} knee or at {RIGHT / LEFT2:43926} ankle for 20 minutes on and off every hour when awake for the next 3 days. Do NOT fall asleep while using the ice pack.     MEDICATION:   - Some degree of discomfort is to be expected after surgery and will improve gradually as the healing process continues. Pain medication has been prescribed for you.  PERCOCET Recommend taking 1 pill every 6 hours as needed for pain. Can take up to 2 pills every 4 hours. Recommended taking the pain medication as scheduled for at least the first 24-72 hours following  surgery.   - Please take the first dose of pain medication prior to local anesthesia wearing off (the operative site may remain numb anywhere from 4-20 hours after surgery). Set an alarm to take a dose of medication overnight.   - Do not drink alcohol, drive a car, operate any machinery, or work with heavy equipment while taking your prescription pain medication.  - It is common for patients to experience nausea and or vomiting from pain medication. In order to help with this you should take your pain medication with food. -Phenergan Additionally, you have been provided with a prescription for anti-nausea medication which may be taken as prescribed.  - Your pain medication may cause constipation. If you have not had a bowel movement for 3 days or more, you may consider an over-the-counter stool softener such as Milk of Magnesia or Colace.  - Additionally you have been prescribed aspirin 81 mg BID to prevent blood clots. Please take as prescribed.  - You may restart your at home prescriptions.     DIET: You may resume your diet. Advance your diet as tolerated. Keep yourself hydrated.                   POST OPERATIVE INSTRUCTIONS      IF YOU NOTICE:   Fever of 101 degrees or over.  Foul smelling or purulent (pus) drainage from operative site.  Increase in drainage .  Sudden onset of pain that is unrelieved with pain medication.  Observe operative extremity for circulation problems: change in color, coldness, numbness, or tingling.   If any symptoms occur, Call office immediately or after hours emergency number.      PHONE NUMBERS:    Please call [phone] if there are any issues or concerns. You may call this number after hours as well for the doctor on call.    FOLLOW UP APPOINTMENT:    Follow up in 1 week, or sooner if issues arise

## 2025-05-30 NOTE — POST-PROCEDURE NOTE
Patient alert and oriented, no c/o nausea. Pain 4/10 at surgical site. Tolerating ginger ale and cookies. Mom at the bedside. AVS reviewed and questions answered. Vitals stable.    1407: Crutches sized and fitted to patient and paper work signed and submitted for insurance. Crutch education given, patient has used them before several times. IV removed, home meds delivered to bedside. Patient ready for discharge home.

## 2025-05-30 NOTE — ANESTHESIA PREPROCEDURE EVALUATION
"Patient: Guy Moreland \"Krishna\"    Procedure Information       Date/Time: 05/30/25 0915    Procedure: RELEASE, TARSAL TUNNEL (Right)    Location: [facility] / [facility]    Surgeons: Candace Chino, SIMI            Visit Vitals  /73   Pulse 75   Resp 16   Ht 1.82 m (5' 11.65\")   Wt 117 kg (257 lb 15 oz)   SpO2 95%   BMI 35.32 kg/m²   Smoking Status Former   BSA 2.43 m²        Current Outpatient Medications   Medication Instructions    atorvastatin (LIPITOR) 40 mg, oral, Daily    busPIRone (BUSPAR) 15 mg, oral, 2 times daily    DULoxetine (Cymbalta) 30 mg DR capsule TAKE THREE (3) CAPSULES BY MOUTH ONCE DAILY. DO NOT CRUSH OR CHEW.    esomeprazole (NEXIUM) 40 mg, oral, Daily    lisinopril 40 mg, oral, Daily    multivitamin with minerals tablet 1 tablet, Daily    QUEtiapine (SEROQUEL) 100 mg, oral, Nightly        Allergies[1]     Surgical History[2]     Relevant Problems   Cardiac   (+) Essential hypertension   (+) Hyperlipidemia      Neuro   (+) Anxiety and depression   (+) Complex regional pain syndrome type 1 of right lower extremity   (+) Complex regional pain syndrome type 2 of right lower extremity      GI   (+) GERD (gastroesophageal reflux disease)   (+) IBS (irritable bowel syndrome)      Liver   (+) Fatty liver      Endocrine   (+) Obesity      Musculoskeletal   (+) Complex regional pain syndrome type 1 of right lower extremity   (+) Complex regional pain syndrome type 2 of right lower extremity      ID   (+) Viral URI      Skin   (+) Eczema       Active Ambulatory Problems     Diagnosis Date Noted    Anxiety and depression 09/30/2023    Eczema 09/30/2023    Fatty liver 09/30/2023    GERD (gastroesophageal reflux disease) 09/30/2023    Hyperlipidemia 09/30/2023    Essential hypertension 09/30/2023    IBS (irritable bowel syndrome) 09/30/2023    Insomnia 09/30/2023    Obesity 09/30/2023    Weight gain 09/30/2023    Primary insomnia 06/25/2024    Annual physical exam 06/25/2024    Viral URI " 06/25/2024    Complex regional pain syndrome type 1 of right lower extremity 09/13/2024    History of right ankle joint replacement 09/13/2024    Chronic pain of right ankle 09/13/2024    Complex regional pain syndrome type 2 of right lower extremity 10/18/2024    Chronic pain of right knee 10/18/2024    Presence of neurostimulator 05/05/2025     Resolved Ambulatory Problems     Diagnosis Date Noted    Hypertension 09/30/2023    Hypokalemia 09/30/2023    Night sweats 09/30/2023    Orchitis 09/30/2023    Otitis externa 09/30/2023    Palpitations 09/30/2023    Panic attack 09/30/2023    Persistent testicular pain 09/30/2023    Postoperative pain 09/30/2023    Proteinuria 09/30/2023    Recurrent UTI 09/30/2023    Skin lesion 09/30/2023    Superficial thrombophlebitis 09/30/2023    Tachycardia 09/30/2023    Gastroesophageal reflux disease without esophagitis 06/25/2024     Past Medical History:   Diagnosis Date    Acute prostatitis 03/03/2019    Adverse effect of anesthesia     Allergy status to unspecified drugs, medicaments and biological substances 06/08/2017    Anxiety     Complex regional pain syndrome of right lower extremity     COVID-19 01/04/2022    Cutaneous abscess of left lower limb 08/11/2015    Depression     Dizziness and giddiness 03/03/2019    Left lower quadrant pain 06/14/2018    Other intra-abdominal and pelvic swelling, mass and lump 07/13/2020    Other intra-abdominal and pelvic swelling, mass and lump 10/08/2020    Pain in right ankle and joints of right foot 06/03/2016    Pain in right lower leg 03/20/2020    Panic attacks     Personal history of diseases of the blood and blood-forming organs and certain disorders involving the immune mechanism 06/08/2020    Personal history of nicotine dependence 02/01/2016    Personal history of other (healed) physical injury and trauma 06/25/2017    Personal history of other diseases of the musculoskeletal system and connective tissue 06/03/2016    Personal  history of other endocrine, nutritional and metabolic disease 03/03/2015    Personal history of urinary (tract) infections 08/06/2014    Sialolithiasis     Unspecified hearing loss, left ear 02/05/2018       Clinical information reviewed:   Tobacco  Allergies  Meds   Med Hx  Surg Hx   Fam Hx  Soc Hx        NPO Detail:    NPO/Void Status  Carbohydrate Drink Given Prior to Surgery? : N  Date of Last Liquid: 05/29/25  Time of Last Liquid: 2200  Date of Last Solid: 05/29/25  Time of Last Solid: 1930  Time of Last Void: 0800         Physical Exam    Airway  Mallampati: II  TM distance: >3 FB  Neck ROM: full  Mouth opening: 3 or more finger widths     Cardiovascular - normal exam   Dental - normal exam     Pulmonary - normal exam   Abdominal (+) obese             Anesthesia Plan    History of general anesthesia?: yes  History of complications of general anesthesia?: no    ASA 2     general   (LMA, patient will shut off spinal cord stimulator immediately before induction, and will keep phone with him to re-activate the stimulator upon awakening in PACU)  The patient is not a current smoker.    intravenous induction   Anesthetic plan and risks discussed with patient and mother.    Plan discussed with CRNA.               [1]   Allergies  Allergen Reactions    Sulfa (Sulfonamide Antibiotics) Unknown and Other     TOLD AS A CHILD NOT TO TAKE   [2]   Past Surgical History:  Procedure Laterality Date    ABDOMINAL SURGERY      ANKLE SURGERY  01/04/2023    Ankle Surgery; total of 8 right ankle surgeries    COLONOSCOPY  06/05/2017    Colonoscopy (Fiberoptic)    HERNIA REPAIR  09/21/2020    Hernia Repair    OTHER SURGICAL HISTORY  06/05/2017    Incision And Drainage Of Skin Abscess Buttocks    OTHER SURGICAL HISTORY      necrotic lipoma removed from abdomen    OTHER SURGICAL HISTORY      I&D of left Buttock Abscess (MRSA)    TOTAL ANKLE ARTHROPLASTY Right

## 2025-05-30 NOTE — ANESTHESIA POSTPROCEDURE EVALUATION
"Patient: Guy Moreland \"Krishna\"    Procedure Summary       Date: 05/30/25 Room / Location: [facility]    Anesthesia Start: 1026 Anesthesia Stop: 1158    Procedure: RELEASE, TARSAL TUNNEL (Right: Foot) Diagnosis:       Tarsal tunnel syndrome of right side      (Diagnosis: G57.51 Tarsal Tunnel Right Ankle)    Surgeons: Candace Chino DPM Responsible Provider: Carlos Acevedo MD    Anesthesia Type: general ASA Status: 2            Anesthesia Type: general    Vitals Value Taken Time   /91 05/30/25 12:41   Temp 36.3 °C (97.3 °F) 05/30/25 11:59   Pulse 64 05/30/25 12:46   Resp 14 05/30/25 12:46   SpO2 99 % 05/30/25 12:46   Vitals shown include unfiled device data.    Anesthesia Post Evaluation    Patient location during evaluation: bedside  Patient participation: complete - patient participated  Level of consciousness: awake  Pain score: 3  Pain management: adequate  Multimodal analgesia pain management approach  Airway patency: patent  Two or more strategies used to mitigate risk of obstructive sleep apnea  Cardiovascular status: acceptable  Respiratory status: acceptable  Hydration status: acceptable  Postoperative Nausea and Vomiting: none        No notable events documented.    "

## 2025-05-30 NOTE — ANESTHESIA PROCEDURE NOTES
Airway  Date/Time: 5/30/2025 10:49 AM  Reason: elective    Airway not difficult    Staffing  Performed: CRNA   Authorized by: Carlos Acevedo MD    Performed by: MAKI Razo  Patient location during procedure: OR    Patient Condition  Indications for airway management: anesthesia  Patient position: sniffing  MILS maintained throughout  Sedation level: deep     Final Airway Details   Preoxygenated: yes  Final airway type: supraglottic airway  Successful airway: classic  Size: 5   Ventilation between attempts: none  Number of attempts at approach: 1  Number of other approaches attempted: 0

## 2025-05-30 NOTE — OP NOTE
"RELEASE, TARSAL TUNNEL (R) Operative Note     Date: 2025  OR Location: [facility]    Name: Guy Moreland \"Krishna\", : 1985, Age: 40 y.o., MRN: [id number], Sex: male    Diagnosis  Pre-op Diagnosis      * Tarsal tunnel syndrome of right side [G57.51] Post-op Diagnosis     * Tarsal tunnel syndrome of right side [G57.51]     Procedures  92089 Tarsal Tunnel Release Right lower extremity.     Surgeons      * Candace Chino - Primary    Resident/Fellow/Other Assistant:  Surgeons and Role:     * Shiva Vivas, DPM - Fellow  Darcy Newell, PGY2    Staff:   Circulator: Roseanna Stark Person: Janice Roblero Scrub: Becca    Anesthesia Staff: Anesthesiologist: Carlos Acevedo MD  CRNA: MAKI Razo  Frontline Breaker: NOLAN Collado    Procedure Summary  Anesthesia: Anesthesia type not filed in the log.  ASA: II  Estimated Blood Loss: 5mL  Intra-op Medications:   Administrations occurring from 0915 to 1045 on 25:   Medication Name Total Dose   LR infusion Cannot be calculated   lidocaine (Xylocaine) injection 2 % 100 mg   midazolam (Versed) injection 1 mg/mL 2 mg   propofol (Diprivan) injection 10 mg/mL 200 mg   ceFAZolin (Ancef) 2 g in dextrose (iso) IV 50 mL 2 g              Anesthesia Record               Intraprocedure I/O Totals          Intake    LR infusion 1000.00 mL    Total Intake 1000 mL          Specimen: No specimens collected              Drains and/or Catheters: * None in log *    Tourniquet Times:   * Missing tourniquet times found for documented tourniquets in lo *     Implants:  Implants       Type Name Action Serial No.       VIAFLOW FLOWABLE PLACENTAL TISSUE MAT[employer] 2.0CC Implanted [id number]                Indications: Guy Moreland \"Latoya" is an 40 y.o. male who is having surgery for Diagnosis: G57.51 Tarsal Tunnel Right Ankle.     Patient was consulted at length regarding the goals, risks, and benefits of surgical intervention and the most common " complications including delayed healing, mal position, infection, numbness, swelling, DVT, and residual pain and possible need for revisional surgery. Also discussed were idiosyncratic risks such as loss of limb and/or death associated with adverse reactions to medications, anesthesia, VTE, or infection were discussed at length with the patient.  Expectations and goals of return to weightbearing/work/school time were discussed as tentative and somewhat variable upon patient's symptomatology postoperatively. Patient was encouraged to call or present to the office or my personal cell phone number if there are any unanswered questions.  The patient understands that one prescription for pain medication will be dispensed at the time of surgery only if needed. If they need a second prescription, they will receive a pain management referral at that time, along with the refill, and there will be no further narcotic pain medication dispensed after the one refill.  Patient does wish to proceed with planned surgical intervention at the conclusion of this conversation. Significant time was spent filling out, and orally reiterating all printed preoperative paperwork and fully explaining intended procedure in layman's terms as well as confirming laterality. Patient denied having further questions at this time regarding the intended surgical procedure(s).    Patient was transferred from the pre operative holding area to the OR and placed on the OR table in a secure supine position.  Anesthesia was administered per the anesthesiologist.  The surgical extremity was prepped and draped in sterile aseptic technique.  An appropriate time out was performed and all in the room were in agreement.      TARSAL TUNNEL DECOMPRESSION (06463)  A curvilinear incision was made just posterior to the medial malleolus following the course of the posterior tibial tendon. An incision was made through anatomical layers measuring approximately 8 cm in  length. The flexor retinaculum was identified and a longitudinal incision was made through the retinaculum to free the tibial nerve.  The posterior tibial nerve was visualized and followed distally into its bifurcation to the medial and lateral plantar nerves.  The deep fascia was incised along the course of these branches to ensure complete decompression.  No masses or space-occupying lesions were noted.  The nerve appeared slightly flattened but intact and without neuroma formation.  After confirming adequate decompression hemostasis the incision site was irrigated with copious amounts of sterile saline.  The subcutaneous tissues were closed with absorbable suture and the skin with absorbable suture as well.       The incisions were dressed with adaptic, 4x4 gauze, yoav and a well padded posterior splint.      The patient tolerated the procedure and anesthesia well and without complication.  The patient was transferred from the OR to the PACU with all vital signs stable and vascular status unchanged to the surgical extremity.  Digits 1,2,3,4 and 5 were well perfused at the end of the procedure.         Evidence of Infection: No   Complications:  None; patient tolerated the procedure well.    Disposition: PACU - hemodynamically stable.  Condition: stable       Attending Attestation: I was present and scrubbed for the entire procedure.    Candace Chino  Phone Number: [phone]

## 2025-06-02 RX ORDER — ATORVASTATIN CALCIUM 80 MG/1
80 TABLET, FILM COATED ORAL DAILY
Qty: 90 TABLET | Refills: 3 | Status: SHIPPED | OUTPATIENT
Start: 2025-06-02 | End: 2026-06-02

## 2025-06-02 ASSESSMENT — PAIN SCALES - GENERAL: PAINLEVEL_OUTOF10: 5 - MODERATE PAIN

## 2025-06-09 ENCOUNTER — PATIENT MESSAGE (OUTPATIENT)
Dept: PRIMARY CARE | Facility: CLINIC | Age: 40
End: 2025-06-09
Payer: COMMERCIAL

## 2025-06-09 DIAGNOSIS — E66.811 OBESITY (BMI 30.0-34.9): Primary | ICD-10-CM

## 2025-08-18 ASSESSMENT — PROMIS GLOBAL HEALTH SCALE
RATE_QUALITY_OF_LIFE: FAIR
RATE_MENTAL_HEALTH: GOOD
EMOTIONAL_PROBLEMS: OFTEN
RATE_AVERAGE_FATIGUE: MODERATE
CARRYOUT_SOCIAL_ACTIVITIES: VERY GOOD
RATE_GENERAL_HEALTH: VERY GOOD
RATE_PHYSICAL_HEALTH: GOOD
CARRYOUT_PHYSICAL_ACTIVITIES: MODERATELY
RATE_SOCIAL_SATISFACTION: FAIR
RATE_AVERAGE_PAIN: 6

## 2025-08-19 DIAGNOSIS — G90.521 COMPLEX REGIONAL PAIN SYNDROME TYPE 1 OF RIGHT LOWER EXTREMITY: ICD-10-CM

## 2025-08-19 DIAGNOSIS — K21.9 GASTROESOPHAGEAL REFLUX DISEASE WITHOUT ESOPHAGITIS: ICD-10-CM

## 2025-08-19 RX ORDER — ESOMEPRAZOLE MAGNESIUM 40 MG/1
40 CAPSULE, DELAYED RELEASE ORAL DAILY
Qty: 90 CAPSULE | Refills: 3 | Status: SHIPPED | OUTPATIENT
Start: 2025-08-19

## 2025-08-20 RX ORDER — DULOXETIN HYDROCHLORIDE 30 MG/1
90 CAPSULE, DELAYED RELEASE ORAL DAILY
Qty: 270 CAPSULE | Refills: 2 | Status: SHIPPED | OUTPATIENT
Start: 2025-08-20 | End: 2026-05-17

## 2025-08-22 ENCOUNTER — APPOINTMENT (OUTPATIENT)
Dept: PRIMARY CARE | Facility: CLINIC | Age: 40
End: 2025-08-22
Payer: COMMERCIAL

## 2025-08-22 VITALS
HEIGHT: 72 IN | OXYGEN SATURATION: 96 % | WEIGHT: 246 LBS | SYSTOLIC BLOOD PRESSURE: 112 MMHG | HEART RATE: 105 BPM | DIASTOLIC BLOOD PRESSURE: 76 MMHG | BODY MASS INDEX: 33.32 KG/M2

## 2025-08-22 DIAGNOSIS — E78.2 MIXED HYPERLIPIDEMIA: ICD-10-CM

## 2025-08-22 DIAGNOSIS — F41.9 ANXIETY AND DEPRESSION: ICD-10-CM

## 2025-08-22 DIAGNOSIS — R22.1 LOCALIZED SWELLING, MASS AND LUMP, NECK: ICD-10-CM

## 2025-08-22 DIAGNOSIS — Z00.00 ANNUAL PHYSICAL EXAM: Primary | ICD-10-CM

## 2025-08-22 DIAGNOSIS — G89.29 CHRONIC PAIN OF RIGHT ANKLE: ICD-10-CM

## 2025-08-22 DIAGNOSIS — M25.571 CHRONIC PAIN OF RIGHT ANKLE: ICD-10-CM

## 2025-08-22 DIAGNOSIS — Z13.6 SCREENING FOR CARDIOVASCULAR CONDITION: ICD-10-CM

## 2025-08-22 DIAGNOSIS — L98.9 SKIN LESION: ICD-10-CM

## 2025-08-22 DIAGNOSIS — L65.9 HAIR LOSS: ICD-10-CM

## 2025-08-22 DIAGNOSIS — F32.A ANXIETY AND DEPRESSION: ICD-10-CM

## 2025-08-22 DIAGNOSIS — F51.01 PRIMARY INSOMNIA: ICD-10-CM

## 2025-08-22 PROBLEM — J06.9 VIRAL URI: Status: RESOLVED | Noted: 2024-06-25 | Resolved: 2025-08-22

## 2025-08-22 PROCEDURE — 3074F SYST BP LT 130 MM HG: CPT | Performed by: INTERNAL MEDICINE

## 2025-08-22 PROCEDURE — 99396 PREV VISIT EST AGE 40-64: CPT | Performed by: INTERNAL MEDICINE

## 2025-08-22 PROCEDURE — 3008F BODY MASS INDEX DOCD: CPT | Performed by: INTERNAL MEDICINE

## 2025-08-22 PROCEDURE — 3078F DIAST BP <80 MM HG: CPT | Performed by: INTERNAL MEDICINE

## 2025-08-22 RX ORDER — ZOLPIDEM TARTRATE 5 MG/1
5 TABLET ORAL NIGHTLY PRN
Qty: 30 TABLET | Refills: 0 | Status: SHIPPED | OUTPATIENT
Start: 2025-08-22 | End: 2025-10-21

## 2025-08-22 RX ORDER — FINASTERIDE 1 MG/1
1 TABLET, FILM COATED ORAL DAILY
Qty: 90 TABLET | Refills: 3 | Status: SHIPPED | OUTPATIENT
Start: 2025-08-22 | End: 2026-08-22

## 2025-08-22 RX ORDER — BUSPIRONE HYDROCHLORIDE 15 MG/1
15 TABLET ORAL 3 TIMES DAILY
Qty: 270 TABLET | Refills: 1 | Status: SHIPPED | OUTPATIENT
Start: 2025-08-22

## 2025-08-22 RX ORDER — QUETIAPINE FUMARATE 25 MG/1
TABLET, FILM COATED ORAL
Qty: 30 TABLET | Refills: 0 | Status: SHIPPED | OUTPATIENT
Start: 2025-08-22

## 2025-09-06 ENCOUNTER — HOSPITAL ENCOUNTER (OUTPATIENT)
Dept: RADIOLOGY | Facility: CLINIC | Age: 40
End: 2025-09-06
Payer: COMMERCIAL

## 2025-11-21 ENCOUNTER — APPOINTMENT (OUTPATIENT)
Dept: PRIMARY CARE | Facility: CLINIC | Age: 40
End: 2025-11-21
Payer: COMMERCIAL

## (undated) DEVICE — SYRINGE, 10 CC, LUER LOCK

## (undated) DEVICE — Device

## (undated) DEVICE — DRAPE, INCISE, ANTIMICROBIAL, IOBAN 2, 13 X 13 IN, DISPOSABLE, STERILE

## (undated) DEVICE — DRESSING, ABDOMINAL, WET PRUF, TENDERSORB, 5 X 9 IN, STERILE

## (undated) DEVICE — BANDAGE, ELASTIC, MATRIX, SELF-CLOSURE, 6 IN X 5 YD, LF

## (undated) DEVICE — GLOVE, SURGICAL, PROTEXIS PI , 7.5, PF, LF

## (undated) DEVICE — TOWELS 4-PK

## (undated) DEVICE — SPONGE, GAUZE, AVANT, STERILE, NONWOVEN, 4PLY, 4 X 4, STANDARD

## (undated) DEVICE — NEEDLE, HYPODERMIC, NEEDLE PRO, 25G X 1.5, ORANGE

## (undated) DEVICE — BANDAGE, GAUZE, COTTON, STERILE, BULKEE II, 4.5IN X 4.1YD

## (undated) DEVICE — SPONGE GAUZE, XRAY RFD, 8X4 12 PLY

## (undated) DEVICE — KIT, MINOR, DOUBLE BASIN

## (undated) DEVICE — ADHESIVE, SKIN, MASTISOL, 2/3 CC VIAL

## (undated) DEVICE — SOLUTION, IRRIGATION, X RX SODIUM CHL 0.9%, 1000ML BTL

## (undated) DEVICE — DRAPE, SHEET, LAPAROTOMY, W/ISO-BAC, W/ARMBOARD COVERS, 98 X 122 IN, DISPOSABLE, LF, STERILE

## (undated) DEVICE — DRESSING, TRANSPARENT, TEGADERM, 8 X 12 IN

## (undated) DEVICE — SUTURE, VICRYL, 2-0, 27 IN, SH, UNDYED

## (undated) DEVICE — DRAPE, C-ARM IMAGE

## (undated) DEVICE — SYRINGE, EPIDURAL, 8ML, LOSS RESISTANCE PERFIX

## (undated) DEVICE — TIP, SUCTION, YANKAUER, FLEXIBLE

## (undated) DEVICE — SLEEVE, VASO PRESS, CALF GARMENT, MEDIUM, GREEN

## (undated) DEVICE — SUTURE, SILK, 2-0, 30 IN, SH, BLACK

## (undated) DEVICE — NEEDLE, SPINAL, 22 G X 3.5 IN, BLACK HUB

## (undated) DEVICE — BITE BLOCK, SOFT, MOUTH, 3/4 X 4, LARGE, WHITE

## (undated) DEVICE — SPINAL NEEDLE, 22G, 6"

## (undated) DEVICE — BANDAGE, ESMARK, 4 IN X 12 FT, LF

## (undated) DEVICE — TUBING, SUCTION, 6MM X 10, CLEAN N-COND

## (undated) DEVICE — GOWN, SURGICAL, SIRUS, NON REINFORCED, LARGE

## (undated) DEVICE — SUTURE, MONOCRYL, 3-0, 27 IN, SH, UNDYED

## (undated) DEVICE — GLOVE, SURGICAL, PROTEXIS PI BLUE W/NEUTHERA, 7.5, PF, LF

## (undated) DEVICE — DRESSING, TRANSPARENT, TEGADERM, 4 X 4.5

## (undated) DEVICE — DRESSING, 2 PORT HEADER, EXTERNAL PULSE GENERATOR

## (undated) DEVICE — GLOVE, SURGICAL, PROTEXIS PI BLUE W/NEUTHERA, 6.5, PF, LF

## (undated) DEVICE — DRAPE, SHEET, LARGE, 70 X 85IN, STERILE

## (undated) DEVICE — SUTURE, MONOCRYL, 4-0, 27 IN, PS-2, UNDYED

## (undated) DEVICE — NEEDLE, HYPODERMIC, MONOJECT, 25 G X 1.5 IN, LUER LOCK HUB, RED

## (undated) DEVICE — CUFF, TOURNIQUET, 30 X 4, DUAL PORT/SNGL BLADDER, DISP, LF

## (undated) DEVICE — DRAPE PACK, BASIC VI, AURORA, 50 X 90IN

## (undated) DEVICE — SUTURE, VICRYL, 2-0, 27 IN, BR/SH 27, VIOLET

## (undated) DEVICE — MARKER, SURGICAL, SKIN, REG TIP, W/ RULER & LABELS

## (undated) DEVICE — DRESSING, ADAPTIC, NON-ADHERENT, 3 X 8 IN

## (undated) DEVICE — STRIP, SKIN CLOSURE, STERI STRIP, REINFORCED, 0.5 X 4 IN

## (undated) DEVICE — CAUTERY, PENCIL, PUSH BUTTON, SMOKE EVAC, 70MM

## (undated) DEVICE — SUTURE, VICRYL, 3-0, 27 IN, SH

## (undated) DEVICE — APPLICATOR, CHLORAPREP, W/ORANGE TINT, 26ML

## (undated) DEVICE — COUNTER, NEEDLE, 1315 MAGNATIC/ FOAM, W/ BOXLOCKS